# Patient Record
Sex: FEMALE | Race: WHITE | NOT HISPANIC OR LATINO | Employment: FULL TIME | URBAN - NONMETROPOLITAN AREA
[De-identification: names, ages, dates, MRNs, and addresses within clinical notes are randomized per-mention and may not be internally consistent; named-entity substitution may affect disease eponyms.]

---

## 2017-11-25 ENCOUNTER — OFFICE VISIT (OUTPATIENT)
Dept: URGENT CARE | Facility: CLINIC | Age: 37
End: 2017-11-25
Payer: COMMERCIAL

## 2017-11-25 ENCOUNTER — APPOINTMENT (OUTPATIENT)
Dept: LAB | Facility: HOSPITAL | Age: 37
End: 2017-11-25
Payer: COMMERCIAL

## 2017-11-25 DIAGNOSIS — R39.15 URGENCY OF URINATION: ICD-10-CM

## 2017-11-25 PROCEDURE — 87186 SC STD MICRODIL/AGAR DIL: CPT

## 2017-11-25 PROCEDURE — 87077 CULTURE AEROBIC IDENTIFY: CPT

## 2017-11-25 PROCEDURE — 99204 OFFICE O/P NEW MOD 45 MIN: CPT

## 2017-11-25 PROCEDURE — 81002 URINALYSIS NONAUTO W/O SCOPE: CPT

## 2017-11-25 PROCEDURE — S9088 SERVICES PROVIDED IN URGENT: HCPCS

## 2017-11-25 PROCEDURE — 87086 URINE CULTURE/COLONY COUNT: CPT

## 2017-11-27 ENCOUNTER — GENERIC CONVERSION - ENCOUNTER (OUTPATIENT)
Dept: OTHER | Facility: OTHER | Age: 37
End: 2017-11-27

## 2017-11-27 LAB — BACTERIA UR CULT: ABNORMAL

## 2017-12-05 NOTE — PROGRESS NOTES
Assessment    1  Acute urinary tract infection (599 0) (N39 0)   2  Acute upper respiratory infection (465 9) (J06 9)    Plan  Acute upper respiratory infection    · Nasal Decongestant Spray 0 05 % Nasal Solution; Use 2 sprays each nostril twice  daily for maximum of 3-5 days   · Sudafed 30 MG Oral Tablet; Take 2 tablets every 6 hours as needed for congestion  Acute upper respiratory infection, Acute urinary tract infection    · Drink at least 6 glasses of water or juice a day ; Status:Complete;   Done: [id number]  Acute urinary tract infection    · Sulfamethoxazole-Trimethoprim 800-160 MG Oral Tablet (Bactrim DS); TAKE 1  TABLET TWICE DAILY WITH FOOD  Urgency of urination    · (1) URINE CULTURE; Source:Urine, Clean Catch; Status:Active - Retrospective By  Protocol Authorization; Requested for:25Nov2017;    · Urine Dip Non-Automated- POC; Status:Resulted - Requires Verification,Retrospective  By Protocol Authorization;   Done: [id number] 08:43AM    Discussion/Summary  Discussion Summary:   Urine dip done today is positive for blood and leukocytes  Bactrim DS twice daily for 5 days  Urine will be sent for culture  Follow-up with the primary care provider if there's no improvement with respiratory her urine symptoms over the next 2-3 days  Go to the ER if any symptoms increase  Avoid use of nasal decongestant more than 5 days which can cause rebound congestion with overuse  Medication Side Effects Reviewed: Possible side effects of new medications were reviewed with the patient/guardian today  Understands and agrees with treatment plan: The treatment plan was reviewed with the patient/guardian  The patient/guardian understands and agrees with the treatment plan   Counseling Documentation With Imm: The patient was counseled regarding instructions for management, impressions  Chief Complaint    1  Cold Symptoms   2   Urinary Frequency  Chief Complaint Free Text Note Form: C/o urinary urgency and burning for 2 days also for 1 week head congestion and ear pressure  History of Present Illness  Hospital Based Practices Required Assessment:   Pain Assessment   the patient states they do not have pain  (on a scale of 0 to 10, the patient rates the pain at 0 )   Abuse And Domestic Violence Screen    Yes, the patient is safe at home  The patient states no one is hurting them  Depression And Suicide Screen  No, the patient has not had thoughts of hurting themself  No, the patient has not felt depressed in the past 7 days  Prefered Language is  [place]  Primary Language is  English  Urinary Frequency:   Natty Avendano presents with complaints of urinary frequency (Complains of increased urinary frequency and dysuria over the past 2 days  Has only had one urinary tract infection this year with no recent treatment  Denies hematuria, flank pain, fevers, chills)   Cold Symptoms:   Natty Avendano presents with complaints of sudden onset of constant episodes of moderate cold symptoms Episodes started 1 week ago (Denies relief with Bharti-Seward  Minimal cough)   Associated symptoms include nasal congestion, runny nose, post nasal drainage and facial pressure, but no scratchy throat, no sore throat, no ear pain, no fever and no chills  Review of Systems  Focused-Female:   Constitutional: as noted in HPI    ENT: as noted in HPI  Respiratory: as noted in HPI  Gastrointestinal: no abdominal pain, no nausea and no vomiting  Genitourinary: as noted in HPI  Active Problems    1  Acid reflux (530 81) (K21 9)   2  Seasonal allergies (477 9) (J30 2)   3  Sinusitis (473 9) (J32 9)    Past Medical History    1  History of urinary tract infection (V13 02) (Z87 440)    Family History  Family History    1  No pertinent family history    Social History    · Never smoker   · No drug use   · Occasional alcohol use    Surgical History    1  History of Knee Surgery    Current Meds   1   Claritin 10 MG Oral Capsule; Therapy: (Recorded:25Nov2017) to Recorded   2  Xulane 150-35 MCG/24HR Transdermal Patch Weekly; Therapy: (Recorded:25Nov2017) to Recorded    Allergies    1  No Known Drug Allergies    2  Seasonal    Vitals  Signs   Recorded: [id number] 08:30AM   Temperature: 96 F  Heart Rate: 69  Respiration: 16  Systolic: 649  Diastolic: 82  Height: 5 ft 9 in  Weight: 144 lb 8 oz  BMI Calculated: 21 34  BSA Calculated: 1 8  O2 Saturation: 99    Physical Exam    Constitutional   General appearance: No acute distress, well appearing and well nourished  Ears, Nose, Mouth, and Throat   External inspection of ears and nose: Normal     Otoscopic examination: Tympanic membranes translucent with normal light reflex  Canals patent without erythema  Oropharynx: Normal with no erythema, edema, exudate or lesions  Pulmonary   Respiratory effort: No increased work of breathing or signs of respiratory distress  Auscultation of lungs: Clear to auscultation  Cardiovascular   Auscultation of heart: Normal rate and rhythm, normal S1 and S2, without murmurs  Abdomen   Abdomen: Non-tender, no masses  No CVA tenderness        Results/Data  Urine Dip Non-Automated- POC [id number] 08:43AM Dana Ortega     Test Name Result Flag Reference   Color Yellow     Clarity Hazy     Leukocytes MOD     Nitrite NEG     Blood TRACE     Bilirubin NEG     Urobilinogen 0 2     Protein TRACE     Ph 8 0     Specific Gravity 1 010     Ketone NEG     Glucose NEG     Color Yellow     Clarity Hazy     Leukocytes MOD     Nitrite NEG     Blood TRACE     Bilirubin NEG     Urobilinogen 0 2     Protein TRACE     Ph 8 0     Specific Gravity 1 010     Ketone NEG     Glucose NEG               Signatures   Electronically signed by : KIMBERLEE Snow; Nov 25 2017  8:48AM EST                       (Author)

## 2018-01-03 ENCOUNTER — OFFICE VISIT (OUTPATIENT)
Dept: URGENT CARE | Facility: CLINIC | Age: 38
End: 2018-01-03
Payer: COMMERCIAL

## 2018-01-03 PROCEDURE — S9088 SERVICES PROVIDED IN URGENT: HCPCS

## 2018-01-03 PROCEDURE — 99213 OFFICE O/P EST LOW 20 MIN: CPT

## 2018-01-11 NOTE — PROGRESS NOTES
Assessment   1  Ear pain (028 98) (H92 09)    Discussion/Summary   Discussion Summary:    Upon physical exam no notable abnormalities  Consider allergies as a cause  She reports sometimes she has to switch up her allergy medications as they no longer become effective  I did recommend she try Zyrtec  She is going to follow up with her PCP to further discuss  Upon leaving the room patient did report she was having some burning with urination, but took left over antibiotics and was feeling better  I did tell her she should also discuss this with her PCP if the dysuria returns  Patient did verbalize understanding  Understands and agrees with treatment plan: The treatment plan was reviewed with the patient/guardian  The patient/guardian understands and agrees with the treatment plan    Counseling Documentation With Imm: The patient was counseled regarding instructions for management,-- prognosis,-- risks and benefits of treatment options  Follow Up Instructions: Follow Up with your Primary Care Provider in 3-5 days  If your symptoms worsen, go to the nearest [place] [address] Emergency Department  Chief Complaint   1  Ear Pain  Chief Complaint Free Text Note Form: C/o b/l ear pain started 01/01/18 also c/o eyes burning sensitive to light  History of Present Illness   HPI: C/o b/l ear pain started 01/01/18 also c/o eyes burning sensitive to light  Hospital Based Practices Required Assessment:      Pain Assessment      the patient states they do not have pain  (on a scale of 0 to 10, the patient rates the pain at 0 )      Abuse And Domestic Violence Screen       Yes, the patient is safe at home  -- The patient states no one is hurting them  Depression And Suicide Screen  No, the patient has not had thoughts of hurting themself  No, the patient has not felt depressed in the past 7 days  Prefered Language is  [place]  Primary Language is  English      Ear Pain: Elliot Kitchen presents with complaints of ear pain  Review of Systems   Focused-Female:      Constitutional: No fever, no chills, feels well, no tiredness, no recent weight gain or loss  ENT: as noted in HPI  Cardiovascular: no complaints of slow or fast heart rate, no chest pain, no palpitations, no leg claudication or lower extremity edema  Respiratory: no complaints of shortness of breath, no wheezing, no dyspnea on exertion, no orthopnea or PND  Breasts: no complaints of breast pain, breast lump or nipple discharge  Gastrointestinal: no complaints of abdominal pain, no constipation, no nausea or diarrhea, no vomiting, no bloody stools  Genitourinary: no complaints of dysuria, no incontinence, no pelvic pain, no dysmenorrhea, no vaginal discharge or abnormal vaginal bleeding  Musculoskeletal: no complaints of arthralgia, no myalgia, no joint swelling or stiffness, no limb pain or swelling  Integumentary: no complaints of skin rash or lesion, no itching or dry skin, no skin wounds  Neurological: no complaints of headache, no confusion, no numbness or tingling, no dizziness or fainting  ROS Reviewed:    ROS reviewed  Active Problems   1  Acid reflux (530 81) (K21 9)   2  Acute upper respiratory infection (465 9) (J06 9)   3  Acute urinary tract infection (599 0) (N39 0)   4  Seasonal allergies (477 9) (J30 2)   5  Sinusitis (473 9) (J32 9)   6  Urgency of urination (788 63) (R39 15)    Past Medical History   1  History of urinary tract infection (V13 02) (Z87 440)  Active Problems And Past Medical History Reviewed: The active problems and past medical history were reviewed and updated today  Family History   Family History Reviewed: The family history was reviewed and updated today  Social History    · Never smoker   · No drug use   · Occasional alcohol use  Social History Reviewed: The social history was reviewed and updated today  Surgical History   1  History of Knee Surgery  Surgical History Reviewed: The surgical history was reviewed and updated today  Current Meds    1  Claritin 10 MG Oral Capsule; Therapy: (Recorded:25Nov2017) to Recorded   2  Nasal Decongestant Spray 0 05 % Nasal Solution; Use 2 sprays each nostril twice daily     for maximum of 3-5 days; Therapy: [id number] to (Last Rx:25Nov2017) Ordered   3  Xulane 150-35 MCG/24HR Transdermal Patch Weekly; Therapy: (Recorded:25Nov2017) to Recorded  Medication List Reviewed: The medication list was reviewed and updated today  Allergies   1  No Known Drug Allergies  2  Seasonal    Physical Exam        Constitutional      General appearance: No acute distress, well appearing and well nourished  Eyes      Conjunctiva and lids: No swelling, erythema or discharge  Pupils and irises: Equal, round and reactive to light  Ears, Nose, Mouth, and Throat      External inspection of ears and nose: Normal        Otoscopic examination: Tympanic membranes translucent with normal light reflex  Canals patent without erythema  Nasal mucosa, septum, and turbinates: Normal without edema or erythema  Oropharynx: Normal with no erythema, edema, exudate or lesions  Pulmonary      Respiratory effort: No increased work of breathing or signs of respiratory distress  Auscultation of lungs: Clear to auscultation  Cardiovascular      Palpation of heart: Normal PMI, no thrills  Auscultation of heart: Normal rate and rhythm, normal S1 and S2, without murmurs  Lymphatic      Palpation of lymph nodes in neck: No lymphadenopathy  Musculoskeletal      Gait and station: Normal        Digits and nails: Normal without clubbing or cyanosis  Skin      Skin and subcutaneous tissue: Normal without rashes or lesions         Psychiatric      Orientation to person, place, and time: Normal        Mood and affect: Normal        Signatures    Electronically signed by : Roman Eugene, [facility]; Deandre  3 2018  6:50PM EST                       (Author)     Electronically signed by : KRYSTLE Alvarez ; Deandre 10 2018  9:18AM EST                       (Co-author)

## 2018-01-17 NOTE — RESULT NOTES
Verified Results  (1) URINE CULTURE [id number] 07:20PM Wood Blanchard Order Number: [id number]     Test Name Result Flag Reference   CLINICAL REPORT (Report) A    Test:        Urine culture  Specimen Type:   Urine  Specimen Date:   11/25/2017 7:20 PM  Result Date:    11/27/2017 12:02 PM  Result Status:   Final result  Abnormal:      Yes  Resulting Lab:   [facility] [address] [place] [address]            Tel: [phone]      CULTURE                                       ------------------                                   20,000-29,000 cfu/ml Escherichia coli (Abnormal)      SUSCEPTIBILITY                                   ------------------                                                       Escherichia coli  METHOD                 MARIO  -------------------------------------  -------------------------  AMPICILLIN ($$)             >16 00 ug/ml Resistant  AMPICILLIN + SULBACTAM ($)       <=8/4 ug/ml  Susceptible  AZTREONAM ($$$)             <=8 ug/ml   Susceptible  CEFAZOLIN ($)              <=8 00 ug/ml Susceptible  CIPROFLOXACIN ($)            <=1 00 ug/ml Susceptible  GENTAMICIN ($$)             <=4 ug/ml   Susceptible  LEVOFLOXACIN ($)            <=2 00 ug/ml Susceptible  NITROFURANTOIN             <=32 ug/ml  Susceptible  PIPERACILLIN + TAZOBACTAM ($$$)     <=16 ug/ml  Susceptible  TETRACYCLINE              >8 ug/ml   Resistant  TOBRAMYCIN ($)             <=4 ug/ml   Susceptible  TRIMETHOPRIM + SULFAMETHOXAZOLE ($$$)  >2/38 ug/ml  Resistant                               Plan  Acute urinary tract infection    · Sulfamethoxazole-Trimethoprim 800-160 MG Oral Tablet (Bactrim DS)   · Ciprofloxacin HCl - 500 MG Oral Tablet;  Take 1 tablet twice daily

## 2018-02-23 ENCOUNTER — OFFICE VISIT (OUTPATIENT)
Dept: URGENT CARE | Facility: CLINIC | Age: 38
End: 2018-02-23
Payer: COMMERCIAL

## 2018-02-23 VITALS
OXYGEN SATURATION: 98 % | DIASTOLIC BLOOD PRESSURE: 91 MMHG | SYSTOLIC BLOOD PRESSURE: 150 MMHG | TEMPERATURE: 97.5 F | HEART RATE: 114 BPM | BODY MASS INDEX: 20.44 KG/M2 | RESPIRATION RATE: 18 BRPM | HEIGHT: 69 IN | WEIGHT: 138 LBS

## 2018-02-23 DIAGNOSIS — N39.0 URINARY TRACT INFECTION WITHOUT HEMATURIA, SITE UNSPECIFIED: Primary | ICD-10-CM

## 2018-02-23 LAB
SL AMB  POCT GLUCOSE, UA: NEGATIVE
SL AMB LEUKOCYTE ESTERASE,UA: ABNORMAL
SL AMB POCT BILIRUBIN,UA: NEGATIVE
SL AMB POCT BLOOD,UA: ABNORMAL
SL AMB POCT CLARITY,UA: CLEAR
SL AMB POCT COLOR,UA: YELLOW
SL AMB POCT KETONES,UA: ABNORMAL
SL AMB POCT NITRITE,UA: NEGATIVE
SL AMB POCT PH,UA: 5
SL AMB POCT SPECIFIC GRAVITY,UA: 1.01
SL AMB POCT URINE PROTEIN: NEGATIVE
SL AMB POCT UROBILINOGEN: NORMAL

## 2018-02-23 PROCEDURE — S9088 SERVICES PROVIDED IN URGENT: HCPCS | Performed by: PHYSICIAN ASSISTANT

## 2018-02-23 PROCEDURE — 99203 OFFICE O/P NEW LOW 30 MIN: CPT | Performed by: PHYSICIAN ASSISTANT

## 2018-02-23 PROCEDURE — 87086 URINE CULTURE/COLONY COUNT: CPT | Performed by: PHYSICIAN ASSISTANT

## 2018-02-23 RX ORDER — LORATADINE 10 MG/1
10 TABLET ORAL DAILY
COMMUNITY

## 2018-02-23 RX ORDER — VALACYCLOVIR HYDROCHLORIDE 500 MG/1
500 TABLET, FILM COATED ORAL 2 TIMES DAILY
COMMUNITY
End: 2019-01-23

## 2018-02-23 RX ORDER — NITROFURANTOIN MACROCRYSTALS 100 MG/1
100 CAPSULE ORAL 2 TIMES DAILY
Qty: 14 CAPSULE | Refills: 0 | Status: SHIPPED | OUTPATIENT
Start: 2018-02-23 | End: 2018-03-02

## 2018-02-23 NOTE — PROGRESS NOTES
Assessment/Plan:      Diagnoses and all orders for this visit:    Urinary tract infection without hematuria, site unspecified  -     Urine culture  -     nitrofurantoin (MACRODANTIN) 100 mg capsule; Take 1 capsule (100 mg total) by mouth 2 (two) times a day for 7 days    Other orders  -     norelgestromin-ethinyl estradiol (ORTHO EVRA) 150-35 MCG/24HR; Place 1 patch on the skin once a week  -     loratadine (CLARITIN) 10 mg tablet; Take 10 mg by mouth daily  -     valACYclovir (VALTREX) 500 mg tablet; Take 500 mg by mouth 2 (two) times a day        Patient Instructions   Take antibiotic as prescribed  Increase fluid intake      Subjective:    Chief Complaint   Patient presents with    Possible UTI     burning with some incontinence since this morning  was seen for uti in past but did not complete abx as prescribed      Patient ID: Manolo Lee is a 30 y o  female  Urinary Tract Infection    This is a recurrent problem  The current episode started yesterday  The problem occurs every urination  The problem has been unchanged  The pain is moderate  There has been no fever  There is no history of pyelonephritis  Associated symptoms include frequency and urgency  Pertinent negatives include no chills, discharge, flank pain, hematuria, hesitancy, nausea, possible pregnancy, sweats or vomiting  She has tried nothing for the symptoms  There is no history of catheterization, kidney stones, recurrent UTIs, a single kidney, urinary stasis or a urological procedure  Review of Systems   Constitutional: Negative for chills  Respiratory: Negative  Cardiovascular: Negative  Gastrointestinal: Negative for nausea and vomiting  Genitourinary: Positive for dysuria, frequency and urgency  Negative for decreased urine volume, difficulty urinating, dyspareunia, enuresis, flank pain, genital sores, hematuria, hesitancy, menstrual problem, pelvic pain, vaginal bleeding, vaginal discharge and vaginal pain  Objective:    /91 (BP Location: Left arm, Patient Position: Sitting, Cuff Size: Adult)   Pulse (!) 114   Temp 97 5 °F (36 4 °C) (Tympanic)   Resp 18   Ht 5' 9" (1 753 m)   Wt 62 6 kg (138 lb)   SpO2 98%   BMI 20 38 kg/m²      Physical Exam   Constitutional: She appears well-developed and well-nourished  Cardiovascular: Normal rate, regular rhythm, normal heart sounds and intact distal pulses  Exam reveals no gallop and no friction rub  No murmur heard  Pulmonary/Chest: Effort normal and breath sounds normal  No respiratory distress  She has no decreased breath sounds  She has no wheezes  She has no rhonchi  She has no rales  She exhibits no tenderness  Abdominal: Soft  Bowel sounds are normal  She exhibits no distension  There is no hepatosplenomegaly, splenomegaly or hepatomegaly  There is tenderness in the suprapubic area  There is no CVA tenderness  No hernia

## 2018-02-24 LAB — BACTERIA UR CULT: NORMAL

## 2018-02-25 ENCOUNTER — TELEPHONE (OUTPATIENT)
Dept: URGENT CARE | Facility: CLINIC | Age: 38
End: 2018-02-25

## 2018-03-17 ENCOUNTER — OFFICE VISIT (OUTPATIENT)
Dept: URGENT CARE | Facility: CLINIC | Age: 38
End: 2018-03-17
Payer: COMMERCIAL

## 2018-03-17 VITALS
TEMPERATURE: 95.2 F | HEART RATE: 83 BPM | BODY MASS INDEX: 19.99 KG/M2 | SYSTOLIC BLOOD PRESSURE: 122 MMHG | RESPIRATION RATE: 16 BRPM | WEIGHT: 135 LBS | DIASTOLIC BLOOD PRESSURE: 82 MMHG | OXYGEN SATURATION: 95 % | HEIGHT: 69 IN

## 2018-03-17 DIAGNOSIS — R35.0 URINARY FREQUENCY: Primary | ICD-10-CM

## 2018-03-17 LAB
SL AMB  POCT GLUCOSE, UA: ABNORMAL
SL AMB LEUKOCYTE ESTERASE,UA: ABNORMAL
SL AMB POCT BILIRUBIN,UA: ABNORMAL
SL AMB POCT BLOOD,UA: ABNORMAL
SL AMB POCT CLARITY,UA: ABNORMAL
SL AMB POCT COLOR,UA: YELLOW
SL AMB POCT KETONES,UA: ABNORMAL
SL AMB POCT NITRITE,UA: ABNORMAL
SL AMB POCT PH,UA: 7
SL AMB POCT SPECIFIC GRAVITY,UA: 1.01
SL AMB POCT URINE PROTEIN: ABNORMAL
SL AMB POCT UROBILINOGEN: 0.2

## 2018-03-17 PROCEDURE — 99213 OFFICE O/P EST LOW 20 MIN: CPT | Performed by: PHYSICIAN ASSISTANT

## 2018-03-17 PROCEDURE — 87077 CULTURE AEROBIC IDENTIFY: CPT | Performed by: PHYSICIAN ASSISTANT

## 2018-03-17 PROCEDURE — 81002 URINALYSIS NONAUTO W/O SCOPE: CPT | Performed by: PHYSICIAN ASSISTANT

## 2018-03-17 PROCEDURE — 87086 URINE CULTURE/COLONY COUNT: CPT | Performed by: PHYSICIAN ASSISTANT

## 2018-03-17 PROCEDURE — S9088 SERVICES PROVIDED IN URGENT: HCPCS | Performed by: PHYSICIAN ASSISTANT

## 2018-03-17 PROCEDURE — 87186 SC STD MICRODIL/AGAR DIL: CPT | Performed by: PHYSICIAN ASSISTANT

## 2018-03-17 NOTE — PATIENT INSTRUCTIONS
Urine dip reveals in significant findings at this time  Urine will be sent for UA, reflux to culture  Increase clear liquids  Follow up with your primary care provider in 2 days  Get evaluated sooner in the emergency room if any symptoms increase

## 2018-03-17 NOTE — PROGRESS NOTES
[facility]        NAME: Cinthia Anderson is a 30 y o  female  : 1980    MRN: [id number]  DATE: 2018  TIME: 9:09 AM    Assessment and Plan   Urinary frequency [R35 0]  1  Urinary frequency  POCT urine dip    UA w Reflex to Microscopic w Reflex to Culture    CANCELED: Urine culture         Patient Instructions     Patient Instructions   Urine dip reveals in significant findings at this time  Urine will be sent for UA, reflux to culture  Increase clear liquids  Follow up with your primary care provider in 2 days  Get evaluated sooner in the emergency room if any symptoms increase  M*Modal software was used to dictate this note  It may contain errors with dictating incorrect words/spelling  Please contact provider directly for any questions  Follow up with PCP in 3-5 days  Proceed to  ER if symptoms worsen  Chief Complaint     Chief Complaint   Patient presents with    Urinary Frequency     Started 2 days ago with freq  burning darker in color  was seen in Feb  for same symptoms and feels that her symptoms never got better even after being on antibiotics  Aaliyah Venegas LPN          History of Present Illness       Patient presents today for evaluation of increased urinary frequency, urgency over the past 2 days  She denies any nausea, vomiting, fever, chills, flank pain  She states her urine is cloudy  She states these symptoms never actually resolved since she was treated for a urinary tract infection in 2017  She has also seen here in February and placed on 2 different antibiotics with continued persistent symptoms  She did not know that her culture was negative at that time in February  Culture was positive in November  She has not seen her family provider for persistent/recurrent symptoms  Review of Systems   Review of Systems   Constitutional: Negative for chills and fever     Gastrointestinal:        As stated in HPI   Genitourinary:        As stated in HPI         Current Medications       Current Outpatient Prescriptions:     loratadine (CLARITIN) 10 mg tablet, Take 10 mg by mouth daily, Disp: , Rfl:     norelgestromin-ethinyl estradiol (ORTHO EVRA) 150-35 MCG/24HR, Place 1 patch on the skin once a week, Disp: , Rfl:     valACYclovir (VALTREX) 500 mg tablet, Take 500 mg by mouth 2 (two) times a day, Disp: , Rfl:     Current Allergies     Allergies as of 03/17/2018    (No Known Allergies)            The following portions of the patient's history were reviewed and updated as appropriate: allergies, current medications, past family history, past medical history, past social history, past surgical history and problem list      Past Medical History:   Diagnosis Date    GERD (gastroesophageal reflux disease)     Herpes     Psychiatric disorder     depression, anxiety       Past Surgical History:   Procedure Laterality Date    KNEE SURGERY         No family history on file  Medications have been verified  Objective   /82 (BP Location: Right arm, Patient Position: Sitting, Cuff Size: Standard)   Pulse 83   Temp (!) 95 2 °F (35 1 °C) (Tympanic)   Resp 16   Ht 5' 9" (1 753 m)   Wt 61 2 kg (135 lb)   SpO2 95%   BMI 19 94 kg/m²        Physical Exam     Physical Exam   Constitutional: She appears well-developed and well-nourished  Neck: Neck supple  Cardiovascular: Normal rate, regular rhythm and normal heart sounds  Pulmonary/Chest: Effort normal and breath sounds normal  No respiratory distress  She has no wheezes  She has no rales  Abdominal: Soft  Bowel sounds are normal  She exhibits no mass  There is no tenderness

## 2018-03-21 LAB
BACTERIA UR CULT: ABNORMAL
BACTERIA UR CULT: ABNORMAL

## 2018-03-22 DIAGNOSIS — N39.0 RECURRENT URINARY TRACT INFECTION: Primary | ICD-10-CM

## 2018-03-22 RX ORDER — CIPROFLOXACIN 500 MG/1
500 TABLET, FILM COATED ORAL EVERY 12 HOURS SCHEDULED
Qty: 14 TABLET | Refills: 0 | Status: SHIPPED | OUTPATIENT
Start: 2018-03-22 | End: 2018-03-23 | Stop reason: SDUPTHER

## 2018-03-23 DIAGNOSIS — N39.0 RECURRENT URINARY TRACT INFECTION: ICD-10-CM

## 2018-03-23 RX ORDER — CIPROFLOXACIN 500 MG/1
500 TABLET, FILM COATED ORAL EVERY 12 HOURS SCHEDULED
Qty: 14 TABLET | Refills: 0 | Status: SHIPPED | OUTPATIENT
Start: 2018-03-23 | End: 2018-03-30

## 2018-11-15 ENCOUNTER — TRANSCRIBE ORDERS (OUTPATIENT)
Dept: URGENT CARE | Facility: CLINIC | Age: 38
End: 2018-11-15

## 2018-11-15 ENCOUNTER — APPOINTMENT (OUTPATIENT)
Dept: RADIOLOGY | Facility: CLINIC | Age: 38
End: 2018-11-15
Payer: COMMERCIAL

## 2018-11-15 DIAGNOSIS — S62.307A: Primary | ICD-10-CM

## 2018-11-15 PROCEDURE — 73120 X-RAY EXAM OF HAND: CPT

## 2019-01-23 ENCOUNTER — OFFICE VISIT (OUTPATIENT)
Dept: URGENT CARE | Facility: CLINIC | Age: 39
End: 2019-01-23
Payer: COMMERCIAL

## 2019-01-23 VITALS
HEIGHT: 69 IN | SYSTOLIC BLOOD PRESSURE: 119 MMHG | TEMPERATURE: 97.8 F | RESPIRATION RATE: 18 BRPM | DIASTOLIC BLOOD PRESSURE: 58 MMHG | HEART RATE: 97 BPM | WEIGHT: 131 LBS | BODY MASS INDEX: 19.4 KG/M2 | OXYGEN SATURATION: 97 %

## 2019-01-23 DIAGNOSIS — J01.00 ACUTE MAXILLARY SINUSITIS, RECURRENCE NOT SPECIFIED: Primary | ICD-10-CM

## 2019-01-23 PROCEDURE — 99213 OFFICE O/P EST LOW 20 MIN: CPT | Performed by: PHYSICIAN ASSISTANT

## 2019-01-23 PROCEDURE — S9088 SERVICES PROVIDED IN URGENT: HCPCS | Performed by: PHYSICIAN ASSISTANT

## 2019-01-23 RX ORDER — AZITHROMYCIN 250 MG/1
TABLET, FILM COATED ORAL
Qty: 6 TABLET | Refills: 0 | Status: SHIPPED | COMMUNITY
Start: 2019-01-23 | End: 2019-01-23 | Stop reason: SDUPTHER

## 2019-01-23 RX ORDER — AZITHROMYCIN 250 MG/1
TABLET, FILM COATED ORAL
Qty: 6 TABLET | Refills: 0 | Status: SHIPPED | OUTPATIENT
Start: 2019-01-23 | End: 2019-01-27

## 2019-01-23 NOTE — PROGRESS NOTES
[address]  (office) [phone]  (fax) [phone]        NAME: Indy Martinez is a 41 y o  female  : 1980    MRN: [id number]  DATE: 2019  TIME: 8:18 AM    Assessment and Plan   Acute maxillary sinusitis, recurrence not specified [J01 00]  1  Acute maxillary sinusitis, recurrence not specified  azithromycin (ZITHROMAX) 250 mg tablet    DISCONTINUED: azithromycin (ZITHROMAX) 250 mg tablet       Patient Instructions   I have prescribed an antibiotic for the infection  Please take the antibiotic as prescribed and finish the entire prescription  I recommend that the patient takes an over the counter probiotic or eats yogurt with live cultures in it [place]) to keep good bacteria in the gut and help prevent diarrhea  Wash hands frequently to prevent the spread of infection  Can use over the counter cough and cold medications to help with symptoms  Ibuprofen and/or tylenol as needed for pain or fever  If not improving over the next 3-5 days, follow up with PCP  To present to the ER if symptoms worsen  Chief Complaint     Chief Complaint   Patient presents with    Cold Like Symptoms     cough,sinus pressure and nasal congestion  Pressure in b/l ears x 3weeks         History of Present Illness   Andralselina Elizabeth presents to the clinic c/o    Sinusitis   This is a new problem  The current episode started 1 to 4 weeks ago  The problem has been gradually worsening since onset  The maximum temperature recorded prior to her arrival was 100 4 - 100 9 F  Associated symptoms include congestion, coughing and sinus pressure  Pertinent negatives include no chills, diaphoresis, ear pain, headaches, neck pain, shortness of breath, sneezing or sore throat  Past treatments include antibiotics (given amox from PCP 2 weeks ago, was called in, did not see PCP)         Review of Systems   Review of Systems   Constitutional: Negative for activity change, appetite change, chills, diaphoresis, fatigue and fever  HENT: Positive for congestion, postnasal drip, sinus pain and sinus pressure  Negative for ear discharge, ear pain, facial swelling, rhinorrhea, sneezing and sore throat  Eyes: Negative for photophobia, pain, discharge, redness, itching and visual disturbance  Respiratory: Positive for cough  Negative for apnea, chest tightness, shortness of breath and wheezing  Cardiovascular: Negative for chest pain  Gastrointestinal: Negative for abdominal distention, abdominal pain, anal bleeding, blood in stool, constipation, diarrhea, nausea and vomiting  Genitourinary: Negative for dysuria, flank pain, frequency, hematuria and urgency  Musculoskeletal: Negative for arthralgias, back pain, gait problem, joint swelling, myalgias, neck pain and neck stiffness  Skin: Negative for color change, rash and wound  Allergic/Immunologic: Negative for immunocompromised state  Neurological: Negative for dizziness, facial asymmetry and headaches  Hematological: Negative for adenopathy  Psychiatric/Behavioral: Negative for confusion and decreased concentration           Current Medications     Long-Term Prescriptions   Medication Sig Dispense Refill    loratadine (CLARITIN) 10 mg tablet Take 10 mg by mouth daily         Current Allergies     Allergies as of 01/23/2019    (No Known Allergies)            The following portions of the patient's history were reviewed and updated as appropriate: allergies, current medications, past family history, past medical history, past social history, past surgical history and problem list   Past Medical History:   Diagnosis Date    GERD (gastroesophageal reflux disease)     Herpes     Psychiatric disorder     depression, anxiety     Past Surgical History:   Procedure Laterality Date    KNEE SURGERY       Social History     Social History    Marital status: Single     Spouse name: N/A    Number of children: N/A    Years of education: N/A     Occupational History    Not on file  Social History Main Topics    Smoking status: Current Some Day Smoker     Packs/day: 0 50    Smokeless tobacco: Never Used    Alcohol use Yes      Comment: socially    Drug use: No    Sexual activity: Not on file     Other Topics Concern    Not on file     Social History Narrative    No narrative on file       Objective   /58   Pulse 97   Temp 97 8 °F (36 6 °C)   Resp 18   Ht 5' 9" (1 753 m)   Wt 59 4 kg (131 lb)   SpO2 97%   BMI 19 35 kg/m²      Physical Exam     Physical Exam   Constitutional: She is oriented to person, place, and time  She appears well-developed and well-nourished  No distress  HENT:   Head: Normocephalic and atraumatic  Right Ear: Tympanic membrane and external ear normal    Left Ear: Tympanic membrane and external ear normal    Nose: Right sinus exhibits maxillary sinus tenderness  Right sinus exhibits no frontal sinus tenderness  Left sinus exhibits maxillary sinus tenderness  Left sinus exhibits no frontal sinus tenderness  Mouth/Throat: Posterior oropharyngeal erythema present  No oropharyngeal exudate  PND visualized   Eyes: Pupils are equal, round, and reactive to light  Conjunctivae and EOM are normal  Right eye exhibits no discharge  Left eye exhibits no discharge  No scleral icterus  Neck: Normal range of motion  Neck supple  No JVD present  No tracheal deviation present  No thyromegaly present  Cardiovascular: Normal rate, regular rhythm and normal heart sounds  Exam reveals no gallop and no friction rub  No murmur heard  Pulmonary/Chest: Effort normal and breath sounds normal  No stridor  No respiratory distress  She has no decreased breath sounds  She has no wheezes  She has no rhonchi  She has no rales  She exhibits no tenderness  Musculoskeletal: Normal range of motion  She exhibits no tenderness or deformity     Lymphadenopathy:     She has no cervical adenopathy  Neurological: She is alert and oriented to person, place, and time  She has normal reflexes  Coordination normal    Skin: Skin is warm and dry  No rash noted  She is not diaphoretic  No erythema  No pallor  Psychiatric: She has a normal mood and affect  Her behavior is normal  Judgment and thought content normal    Nursing note and vitals reviewed        Murray Abernathy PA-C

## 2019-02-28 ENCOUNTER — OFFICE VISIT (OUTPATIENT)
Dept: URGENT CARE | Facility: CLINIC | Age: 39
End: 2019-02-28
Payer: COMMERCIAL

## 2019-02-28 VITALS
BODY MASS INDEX: 19.26 KG/M2 | TEMPERATURE: 98.4 F | OXYGEN SATURATION: 96 % | HEART RATE: 104 BPM | SYSTOLIC BLOOD PRESSURE: 134 MMHG | WEIGHT: 130 LBS | RESPIRATION RATE: 18 BRPM | HEIGHT: 69 IN | DIASTOLIC BLOOD PRESSURE: 91 MMHG

## 2019-02-28 DIAGNOSIS — K52.9 GASTROENTERITIS: Primary | ICD-10-CM

## 2019-02-28 DIAGNOSIS — R11.0 NAUSEA: ICD-10-CM

## 2019-02-28 PROCEDURE — S9088 SERVICES PROVIDED IN URGENT: HCPCS | Performed by: PHYSICIAN ASSISTANT

## 2019-02-28 PROCEDURE — 99213 OFFICE O/P EST LOW 20 MIN: CPT | Performed by: PHYSICIAN ASSISTANT

## 2019-02-28 RX ORDER — ALBUTEROL SULFATE 90 UG/1
1 AEROSOL, METERED RESPIRATORY (INHALATION) EVERY 6 HOURS PRN
COMMUNITY

## 2019-02-28 RX ORDER — ONDANSETRON 4 MG/1
4 TABLET, FILM COATED ORAL EVERY 8 HOURS PRN
Qty: 20 TABLET | Refills: 0 | Status: SHIPPED | OUTPATIENT
Start: 2019-02-28

## 2019-02-28 RX ORDER — LANOLIN ALCOHOL/MO/W.PET/CERES
3 CREAM (GRAM) TOPICAL
COMMUNITY

## 2019-02-28 NOTE — PROGRESS NOTES
[address] [facility]  (office) [phone]  (fax) [phone]        NAME: Stephon Madrid is a 41 y o  female  : 1980    MRN: [id number]  DATE: 2019  TIME: 10:50 AM    Assessment and Plan   Gastroenteritis [K52 9]  1  Gastroenteritis     2  Nausea  ondansetron (ZOFRAN) 4 mg tablet       Patient Instructions   If the pain begins to localize especially in the RLQ or if the pain worsens, to present to the ER for further evaluation  Stay hydrated by taking small sips of water through out the day  Avoid large amounts of water at one time  Advance diet as tolerated starting with crackers, toast   Can use imodium for diarrhea  If you are unable to hold down fluids and feel dehydrated, go to the emergency room  Can take tylenol or ibuprofen as needed for headache, pain, fever  Probiotics which can be found over the counter in pill form or in yogurt, such as activia, would be beneficial to increase normal gut pancho and help with diarrhea  If symptoms worsen go to the emergency room  To present to the ER if symptoms worsen  Chief Complaint     Chief Complaint   Patient presents with    Vomiting     with chills x 1 day          History of Present Illness   Andraleen Byers Button presents to the clinic c/o  No chance of pregnancy, tubes tied  Vomiting    This is a new problem  The current episode started yesterday  The problem occurs more than 10 times per day  The emesis has an appearance of bile  There has been no fever  Associated symptoms include abdominal pain (diffuse) and chills  Pertinent negatives include no arthralgias, chest pain, coughing, diarrhea, dizziness, fever, headaches, myalgias, sweats, URI or weight loss  She has tried increased fluids for the symptoms  The treatment provided no relief  Review of Systems   Review of Systems   Constitutional: Positive for chills   Negative for activity change, appetite change, diaphoresis, fatigue, fever and weight loss  HENT: Negative for congestion, ear discharge, ear pain, facial swelling, rhinorrhea, sinus pressure, sinus pain, sneezing and sore throat  Eyes: Negative for photophobia, pain, discharge, redness, itching and visual disturbance  Respiratory: Negative for apnea, cough, chest tightness, shortness of breath and wheezing  Cardiovascular: Negative for chest pain  Gastrointestinal: Positive for abdominal pain (diffuse) and vomiting  Negative for abdominal distention, anal bleeding, blood in stool, constipation, diarrhea and nausea  Genitourinary: Negative for dysuria, flank pain, frequency, hematuria and urgency  Musculoskeletal: Negative for arthralgias, back pain, gait problem, joint swelling, myalgias, neck pain and neck stiffness  Skin: Negative for color change, rash and wound  Allergic/Immunologic: Negative for immunocompromised state  Neurological: Negative for dizziness, facial asymmetry and headaches  Hematological: Negative for adenopathy  Psychiatric/Behavioral: Negative for confusion and decreased concentration           Current Medications     Long-Term Medications   Medication Sig Dispense Refill    loratadine (CLARITIN) 10 mg tablet Take 10 mg by mouth daily      ondansetron (ZOFRAN) 4 mg tablet Take 1 tablet (4 mg total) by mouth every 8 (eight) hours as needed for nausea or vomiting 20 tablet 0       Current Allergies     Allergies as of 02/28/2019    (No Known Allergies)            The following portions of the patient's history were reviewed and updated as appropriate: allergies, current medications, past family history, past medical history, past social history, past surgical history and problem list   Past Medical History:   Diagnosis Date    GERD (gastroesophageal reflux disease)     Herpes     Psychiatric disorder     depression, anxiety     Past Surgical History:   Procedure Laterality Date    KNEE SURGERY       Social History     Socioeconomic History    Marital status: Single     Spouse name: Not on file    Number of children: Not on file    Years of education: Not on file    Highest education level: Not on file   Occupational History    Not on file   Social Needs    Financial resource strain: Not on file    Food insecurity:     Worry: Not on file     Inability: Not on file    Transportation needs:     Medical: Not on file     Non-medical: Not on file   Tobacco Use    Smoking status: Current Some Day Smoker     Packs/day: 0 50    Smokeless tobacco: Never Used   Substance and Sexual Activity    Alcohol use: Yes     Comment: socially    Drug use: No    Sexual activity: Not on file   Lifestyle    Physical activity:     Days per week: Not on file     Minutes per session: Not on file    Stress: Not on file   Relationships    Social connections:     Talks on phone: Not on file     Gets together: Not on file     Attends Baptism service: Not on file     Active member of club or organization: Not on file     Attends meetings of clubs or organizations: Not on file     Relationship status: Not on file    Intimate partner violence:     Fear of current or ex partner: Not on file     Emotionally abused: Not on file     Physically abused: Not on file     Forced sexual activity: Not on file   Other Topics Concern    Not on file   Social History Narrative    Not on file       Objective   /91   Pulse 104   Temp 98 4 °F (36 9 °C)   Resp 18   Ht 5' 9" (1 753 m)   Wt 59 kg (130 lb)   SpO2 96%   BMI 19 20 kg/m²      Physical Exam     Physical Exam   Constitutional: She is oriented to person, place, and time  She appears well-developed and well-nourished  No distress  HENT:   Head: Normocephalic and atraumatic  Right Ear: Tympanic membrane and external ear normal    Left Ear: Tympanic membrane and external ear normal    Nose: Nose normal    Mouth/Throat: Oropharynx is clear and moist  No oropharyngeal exudate  Eyes: Pupils are equal, round, and reactive to light  Conjunctivae and EOM are normal  Right eye exhibits no discharge  Left eye exhibits no discharge  No scleral icterus  Neck: Normal range of motion  Neck supple  No JVD present  No tracheal deviation present  No thyromegaly present  Cardiovascular: Normal rate, regular rhythm and normal heart sounds  Exam reveals no gallop and no friction rub  No murmur heard  Pulmonary/Chest: Effort normal and breath sounds normal  No stridor  No respiratory distress  She has no decreased breath sounds  She has no wheezes  She has no rhonchi  She has no rales  She exhibits no tenderness  Abdominal: Soft  Bowel sounds are normal  She exhibits no distension and no mass  There is generalized tenderness (mild)  There is no rigidity, no rebound, no guarding, no CVA tenderness, no tenderness at McBurney's point and negative Kang's sign  No hernia  Musculoskeletal: Normal range of motion  She exhibits no tenderness or deformity  Lymphadenopathy:     She has no cervical adenopathy  Neurological: She is alert and oriented to person, place, and time  She has normal reflexes  Coordination normal    Skin: Skin is warm and dry  No rash noted  She is not diaphoretic  No erythema  No pallor  Psychiatric: She has a normal mood and affect  Her behavior is normal  Judgment and thought content normal    Nursing note and vitals reviewed        Sophia Pearson PA-C

## 2021-12-13 ENCOUNTER — APPOINTMENT (OUTPATIENT)
Dept: LAB | Facility: HOSPITAL | Age: 41
End: 2021-12-13
Payer: COMMERCIAL

## 2022-01-18 ENCOUNTER — OFFICE VISIT (OUTPATIENT)
Dept: OBGYN CLINIC | Facility: CLINIC | Age: 42
End: 2022-01-18
Payer: COMMERCIAL

## 2022-01-18 ENCOUNTER — APPOINTMENT (OUTPATIENT)
Dept: RADIOLOGY | Facility: CLINIC | Age: 42
End: 2022-01-18
Payer: COMMERCIAL

## 2022-01-18 VITALS
SYSTOLIC BLOOD PRESSURE: 146 MMHG | BODY MASS INDEX: 23.7 KG/M2 | WEIGHT: 160 LBS | HEIGHT: 69 IN | HEART RATE: 93 BPM | DIASTOLIC BLOOD PRESSURE: 89 MMHG

## 2022-01-18 DIAGNOSIS — M17.11 PRIMARY OSTEOARTHRITIS OF RIGHT KNEE: ICD-10-CM

## 2022-01-18 DIAGNOSIS — M25.561 ACUTE PAIN OF RIGHT KNEE: ICD-10-CM

## 2022-01-18 DIAGNOSIS — M25.561 ACUTE PAIN OF RIGHT KNEE: Primary | ICD-10-CM

## 2022-01-18 PROCEDURE — 20610 DRAIN/INJ JOINT/BURSA W/O US: CPT | Performed by: ORTHOPAEDIC SURGERY

## 2022-01-18 PROCEDURE — 73562 X-RAY EXAM OF KNEE 3: CPT

## 2022-01-18 PROCEDURE — 99243 OFF/OP CNSLTJ NEW/EST LOW 30: CPT | Performed by: ORTHOPAEDIC SURGERY

## 2022-01-18 RX ORDER — TRIAMCINOLONE ACETONIDE 40 MG/ML
80 INJECTION, SUSPENSION INTRA-ARTICULAR; INTRAMUSCULAR
Status: COMPLETED | OUTPATIENT
Start: 2022-01-18 | End: 2022-01-18

## 2022-01-18 RX ORDER — MELOXICAM 15 MG/1
15 TABLET ORAL DAILY
Qty: 30 TABLET | Refills: 2 | Status: SHIPPED | OUTPATIENT
Start: 2022-01-18

## 2022-01-18 RX ORDER — BUPIVACAINE HYDROCHLORIDE 2.5 MG/ML
4 INJECTION, SOLUTION INFILTRATION; PERINEURAL
Status: COMPLETED | OUTPATIENT
Start: 2022-01-18 | End: 2022-01-18

## 2022-01-18 RX ADMIN — BUPIVACAINE HYDROCHLORIDE 4 ML: 2.5 INJECTION, SOLUTION INFILTRATION; PERINEURAL at 08:49

## 2022-01-18 RX ADMIN — TRIAMCINOLONE ACETONIDE 80 MG: 40 INJECTION, SUSPENSION INTRA-ARTICULAR; INTRAMUSCULAR at 08:49

## 2022-01-18 NOTE — PROGRESS NOTES
Assessment:  1  Acute pain of right knee  XR knee 3 vw right non injury    Injection procedure prior authorization   2  Primary osteoarthritis of right knee  Injection procedure prior authorization    meloxicam (Mobic) 15 mg tablet       Plan:  Right knee osteoarthritis  The patient has on-going right knee pain, history of 2x arthroscopy and has recently tried steroid injection and therefore visco-supplement was ordered  The patient was provided with right knee steroid injection  The patient tolerated the procedure well  The patient should follow up in 3 months for visco-supplement injections  To do next visit:  Return in about 6 weeks (around 3/1/2022) for visco-supplement injections   The above stated was discussed in layman's terms and the patient expressed understanding  All questions were answered to the patient's satisfaction  The patient has advanced arthritic changes of her right knee  There is no medial joint space remaining  Physical examination shows point tenderness along this region  No major patellofemoral or lateral joint pain  Under aseptic technique, the knee was aspirated of 30 cc of fluid and injected with corticosteroid  She tolerated procedure quite well  We will get her approved for viscosupplementation  Follow-up in 6 weeks for re-evaluation  She was also prescribed meloxicam   Return back if there is any further problems    Scribe Attestation    I,:  Portia Shook am acting as a scribe while in the presence of the attending physician :       I,:  Eddy Stephenson DO personally performed the services described in this documentation    as scribed in my presence :             Subjective:   Tammi Gonzalez is a 46 y o  female who presents for initial evaluation of right knee  She has longstanding history of knee symptoms and worse over the past 6 months with no injury  Today she complains of right anteromedial and posterior right knee pain    She rates her pain at 3/10 and 7/10 at its worse  Prolonged sitting aggravates while change of position and stretching alleviates  She has used otc medications with limited benefit  She currently uses IBU 800mg with limited benefit  She does use methocarbamol with some benefit  She had 1x steroid one year ago with benefit at [facility]  History of 2x arthroscopy for meniscus tear by Dr Medina  Review of systems negative unless otherwise specified in HPI    Past Medical History:   Diagnosis Date    GERD (gastroesophageal reflux disease)     Herpes     Psychiatric disorder     depression, anxiety       Past Surgical History:   Procedure Laterality Date    KNEE SURGERY         History reviewed  No pertinent family history      Social History     Occupational History    Not on file   Tobacco Use    Smoking status: Current Some Day Smoker     Packs/day: 0 50    Smokeless tobacco: Never Used   Vaping Use    Vaping Use: Never used   Substance and Sexual Activity    Alcohol use: Yes     Comment: socially    Drug use: No    Sexual activity: Not on file         Current Outpatient Medications:     loratadine (CLARITIN) 10 mg tablet, Take 10 mg by mouth daily, Disp: , Rfl:     albuterol (PROVENTIL HFA,VENTOLIN HFA) 90 mcg/act inhaler, Inhale 1 puff every 6 (six) hours as needed (Patient not taking: Reported on 1/18/2022 ), Disp: , Rfl:     melatonin 3 mg, Take 3 mg by mouth (Patient not taking: Reported on 1/18/2022 ), Disp: , Rfl:     meloxicam (Mobic) 15 mg tablet, Take 1 tablet (15 mg total) by mouth daily, Disp: 30 tablet, Rfl: 2    ondansetron (ZOFRAN) 4 mg tablet, Take 1 tablet (4 mg total) by mouth every 8 (eight) hours as needed for nausea or vomiting (Patient not taking: Reported on 1/18/2022 ), Disp: 20 tablet, Rfl: 0    No Known Allergies         Vitals:    01/18/22 0815   BP: 146/89   Pulse: 93       Objective:  Physical exam  · General: Awake, Alert, Oriented  · Eyes: Pupils equal, round and reactive to light  · Heart: regular rate and rhythm  · Lungs: No audible wheezing  · Abdomen: soft                    Ortho Exam  Right knee:  Well healed arthroscopic portal scars  No erythema or ecchymosis  Modest effusion and swelling  Normal strength  Good ROM   Calf compartments soft and supple  Sensation intact  Toes are warm sensate and mobile        Diagnostics, reviewed and taken today if performed as documented: The attending physician has personally reviewed the pertinent films in PACS and interpretation is as follows:  Right knee x-ray:  Medial bone on bone apposition  Procedures, if performed today:    Large joint arthrocentesis: R knee  Universal Protocol:  Consent: Verbal consent obtained  Risks and benefits: risks, benefits and alternatives were discussed  Consent given by: patient  Time out: Immediately prior to procedure a "time out" was called to verify the correct patient, procedure, equipment, support staff and site/side marked as required  Timeout called at: 1/18/2022 8:39 AM   Patient understanding: patient states understanding of the procedure being performed  Site marked: the operative site was marked  Patient identity confirmed: verbally with patient    Supporting Documentation  Indications: pain   Procedure Details  Location: knee - R knee  Preparation: Patient was prepped and draped in the usual sterile fashion  Needle size: 22 G  Ultrasound guidance: no  Approach: anterolateral  Medications administered: 4 mL bupivacaine 0 25 %; 80 mg triamcinolone acetonide 40 mg/mL    Aspirate amount: 30 mL  Aspirate: blood-tinged    Patient tolerance: patient tolerated the procedure well with no immediate complications  Dressing:  Sterile dressing applied            Portions of the record may have been created with voice recognition software  Occasional wrong word or "sound a like" substitutions may have occurred due to the inherent limitations of voice recognition software    Read the chart carefully and recognize, using context, where substitutions have occurred

## 2022-01-18 NOTE — LETTER
January 18, 2022     [address] [facility]    Patient: Cinthia Anderson   YOB: 1980   Date of Visit: 1/18/2022       Dear Dr Kasi Feldman:    Thank you for referring Rosecarissa Blackwell to me for evaluation  Below are my notes for this consultation  If you have questions, please do not hesitate to call me  I look forward to following your patient along with you  Sincerely,        Elisabeth Montes, DO        CC: No Recipients  Elisabeth Montes,   1/18/2022  8:54 AM  Signed  Assessment:  1  Acute pain of right knee  XR knee 3 vw right non injury    Injection procedure prior authorization   2  Primary osteoarthritis of right knee  Injection procedure prior authorization    meloxicam (Mobic) 15 mg tablet       Plan:  Right knee osteoarthritis  The patient has on-going right knee pain, history of 2x arthroscopy and has recently tried steroid injection and therefore visco-supplement was ordered  The patient was provided with right knee steroid injection  The patient tolerated the procedure well  The patient should follow up in 3 months for visco-supplement injections  To do next visit:  Return in about 6 weeks (around 3/1/2022) for visco-supplement injections   The above stated was discussed in layman's terms and the patient expressed understanding  All questions were answered to the patient's satisfaction  The patient has advanced arthritic changes of her right knee  There is no medial joint space remaining  Physical examination shows point tenderness along this region  No major patellofemoral or lateral joint pain  Under aseptic technique, the knee was aspirated of 30 cc of fluid and injected with corticosteroid  She tolerated procedure quite well  We will get her approved for viscosupplementation  Follow-up in 6 weeks for re-evaluation    She was also prescribed meloxicam   Return back if there is any further problems    Scribe Attestation    I,:  Suma Sams am acting as a scribe while in the presence of the attending physician :       I,:  Tiff Stanford, DO personally performed the services described in this documentation    as scribed in my presence :             Subjective:   Osmel Perdue is a 46 y o  female who presents for initial evaluation of right knee  She has longstanding history of knee symptoms and worse over the past 6 months with no injury  Today she complains of right anteromedial and posterior right knee pain  She rates her pain at 3/10 and 7/10 at its worse  Prolonged sitting aggravates while change of position and stretching alleviates  She has used otc medications with limited benefit  She currently uses IBU 800mg with limited benefit  She does use methocarbamol with some benefit  She had 1x steroid one year ago with benefit at [facility]  History of 2x arthroscopy for meniscus tear by Dr Medina  Review of systems negative unless otherwise specified in HPI    Past Medical History:   Diagnosis Date    GERD (gastroesophageal reflux disease)     Herpes     Psychiatric disorder     depression, anxiety       Past Surgical History:   Procedure Laterality Date    KNEE SURGERY         History reviewed  No pertinent family history      Social History     Occupational History    Not on file   Tobacco Use    Smoking status: Current Some Day Smoker     Packs/day: 0 50    Smokeless tobacco: Never Used   Vaping Use    Vaping Use: Never used   Substance and Sexual Activity    Alcohol use: Yes     Comment: socially    Drug use: No    Sexual activity: Not on file         Current Outpatient Medications:     loratadine (CLARITIN) 10 mg tablet, Take 10 mg by mouth daily, Disp: , Rfl:     albuterol (PROVENTIL HFA,VENTOLIN HFA) 90 mcg/act inhaler, Inhale 1 puff every 6 (six) hours as needed (Patient not taking: Reported on 1/18/2022 ), Disp: , Rfl:     melatonin 3 mg, Take 3 mg by mouth (Patient not taking: Reported on 1/18/2022 ), Disp: , Rfl:     meloxicam (Mobic) 15 mg tablet, Take 1 tablet (15 mg total) by mouth daily, Disp: 30 tablet, Rfl: 2    ondansetron (ZOFRAN) 4 mg tablet, Take 1 tablet (4 mg total) by mouth every 8 (eight) hours as needed for nausea or vomiting (Patient not taking: Reported on 1/18/2022 ), Disp: 20 tablet, Rfl: 0    No Known Allergies         Vitals:    01/18/22 0815   BP: 146/89   Pulse: 93       Objective:  Physical exam  · General: Awake, Alert, Oriented  · Eyes: Pupils equal, round and reactive to light  · Heart: regular rate and rhythm  · Lungs: No audible wheezing  · Abdomen: soft                    Ortho Exam  Right knee:  Well healed arthroscopic portal scars  No erythema or ecchymosis  Modest effusion and swelling  Normal strength  Good ROM   Calf compartments soft and supple  Sensation intact  Toes are warm sensate and mobile        Diagnostics, reviewed and taken today if performed as documented: The attending physician has personally reviewed the pertinent films in PACS and interpretation is as follows:  Right knee x-ray:  Medial bone on bone apposition  Procedures, if performed today:    Large joint arthrocentesis: R knee  Universal Protocol:  Consent: Verbal consent obtained  Risks and benefits: risks, benefits and alternatives were discussed  Consent given by: patient  Time out: Immediately prior to procedure a "time out" was called to verify the correct patient, procedure, equipment, support staff and site/side marked as required    Timeout called at: 1/18/2022 8:39 AM   Patient understanding: patient states understanding of the procedure being performed  Site marked: the operative site was marked  Patient identity confirmed: verbally with patient    Supporting Documentation  Indications: pain   Procedure Details  Location: knee - R knee  Preparation: Patient was prepped and draped in the usual sterile fashion  Needle size: 22 G  Ultrasound guidance: no  Approach: anterolateral  Medications administered: 4 mL bupivacaine 0 25 %; 80 mg triamcinolone acetonide 40 mg/mL    Aspirate amount: 30 mL  Aspirate: blood-tinged    Patient tolerance: patient tolerated the procedure well with no immediate complications  Dressing:  Sterile dressing applied            Portions of the record may have been created with voice recognition software  Occasional wrong word or "sound a like" substitutions may have occurred due to the inherent limitations of voice recognition software  Read the chart carefully and recognize, using context, where substitutions have occurred

## 2022-03-11 ENCOUNTER — TELEPHONE (OUTPATIENT)
Dept: OBGYN CLINIC | Facility: CLINIC | Age: 42
End: 2022-03-11

## 2022-03-11 NOTE — TELEPHONE ENCOUNTER
Dr Gibson  RE: Delivery of Euflexxa   CB#: [phone] from [address] [place] [address] called to set up delivery of patients euflexxa injections         Transferred to [address] [place] [address]

## 2022-03-12 ENCOUNTER — APPOINTMENT (OUTPATIENT)
Dept: RADIOLOGY | Facility: MEDICAL CENTER | Age: 42
End: 2022-03-12
Payer: COMMERCIAL

## 2022-03-12 ENCOUNTER — OFFICE VISIT (OUTPATIENT)
Dept: URGENT CARE | Facility: MEDICAL CENTER | Age: 42
End: 2022-03-12
Payer: COMMERCIAL

## 2022-03-12 VITALS
HEIGHT: 67 IN | BODY MASS INDEX: 25.11 KG/M2 | DIASTOLIC BLOOD PRESSURE: 76 MMHG | RESPIRATION RATE: 16 BRPM | WEIGHT: 160 LBS | HEART RATE: 108 BPM | SYSTOLIC BLOOD PRESSURE: 110 MMHG | OXYGEN SATURATION: 97 % | TEMPERATURE: 97.8 F

## 2022-03-12 DIAGNOSIS — S69.92XA HAND INJURY, LEFT, INITIAL ENCOUNTER: ICD-10-CM

## 2022-03-12 DIAGNOSIS — R07.89 CHEST WALL PAIN: ICD-10-CM

## 2022-03-12 DIAGNOSIS — S29.9XXA INJURY OF CHEST WALL, INITIAL ENCOUNTER: ICD-10-CM

## 2022-03-12 DIAGNOSIS — S63.635A SPRAIN OF INTERPHALANGEAL JOINT OF LEFT RING FINGER, INITIAL ENCOUNTER: Primary | ICD-10-CM

## 2022-03-12 PROCEDURE — G0381 LEV 2 HOSP TYPE B ED VISIT: HCPCS | Performed by: PHYSICIAN ASSISTANT

## 2022-03-12 PROCEDURE — 73130 X-RAY EXAM OF HAND: CPT

## 2022-03-12 PROCEDURE — 71111 X-RAY EXAM RIBS/CHEST4/> VWS: CPT
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
## 2022-03-31 ENCOUNTER — PROCEDURE VISIT (OUTPATIENT)
Dept: OBGYN CLINIC | Facility: CLINIC | Age: 42
End: 2022-03-31
Payer: COMMERCIAL

## 2022-03-31 VITALS — WEIGHT: 160 LBS | BODY MASS INDEX: 25.11 KG/M2 | HEIGHT: 67 IN

## 2022-03-31 DIAGNOSIS — M17.11 PRIMARY OSTEOARTHRITIS OF RIGHT KNEE: Primary | ICD-10-CM

## 2022-03-31 PROCEDURE — 99213 OFFICE O/P EST LOW 20 MIN: CPT | Performed by: ORTHOPAEDIC SURGERY

## 2022-03-31 PROCEDURE — 20610 DRAIN/INJ JOINT/BURSA W/O US: CPT | Performed by: ORTHOPAEDIC SURGERY

## 2022-03-31 RX ORDER — HYALURONATE SODIUM 10 MG/ML
20 SYRINGE (ML) INTRAARTICULAR
Status: COMPLETED | OUTPATIENT
Start: 2022-03-31 | End: 2022-03-31

## 2022-03-31 RX ADMIN — Medication 20 MG: at 09:06
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
## 2022-04-07 ENCOUNTER — PROCEDURE VISIT (OUTPATIENT)
Dept: OBGYN CLINIC | Facility: CLINIC | Age: 42
End: 2022-04-07
Payer: COMMERCIAL

## 2022-04-07 VITALS — WEIGHT: 160 LBS | HEIGHT: 67 IN | BODY MASS INDEX: 25.11 KG/M2

## 2022-04-07 DIAGNOSIS — M17.11 PRIMARY OSTEOARTHRITIS OF RIGHT KNEE: Primary | ICD-10-CM

## 2022-04-07 PROCEDURE — 20610 DRAIN/INJ JOINT/BURSA W/O US: CPT | Performed by: ORTHOPAEDIC SURGERY

## 2022-04-07 RX ORDER — HYALURONATE SODIUM 10 MG/ML
20 SYRINGE (ML) INTRAARTICULAR
Status: COMPLETED | OUTPATIENT
Start: 2022-04-07 | End: 2022-04-07

## 2022-04-07 RX ADMIN — Medication 20 MG: at 09:28

## 2022-04-07 NOTE — PROGRESS NOTES
Assessment/Plan:  Diagnoses and all orders for this visit:    Primary osteoarthritis of right knee  -     Large joint arthrocentesis    Patient was provided with 2nd of 3 shot Euflexxa viscosupplementation injection series for treatment of primary osteoarthritis of the right knee  Patient tolerated treatment(s) well  She will be seen for follow-up in 1 week for completion of Euflexxa injection series  Patient expresses understanding and is in agreement with this treatment plan  Under aseptic technique, the right knee was injected with her 2nd set of Euflexxa  She tolerated procedure quite well  Return back next week for final set of injections  If her condition changes, she will not hesitate to let us know  Physical examination shows diffuse medial lateral joint tenderness with patellofemoral crepitation  Negative Homans  Neurologically intact distally    Subjective:    Patient info: Filiberto Lugo 38 y o  female    HPI    Patient presents today for re-evaluation and continuation of Euflexxa viscosupplementation injection series for treatment of primary osteoarthritis of the right knee  Patient was last seen in regards to this issue on 3/31/2022, at which time she received her initial Euflexxa injection  Patient denies any adverse reaction to previous injections including fever, chills, headache, nausea, dizziness, or malaise  On today's presentation she reports continued medial knee pain with weight-bearing  She denies any recent onset bruising, swelling, numbness, tingling, feelings of instability  Patient's medical history has been reviewed in detail and updated the computerized patient record  Past Medical History:   Diagnosis Date    GERD (gastroesophageal reflux disease)     Herpes     Psychiatric disorder     depression, anxiety       Past Surgical History:   Procedure Laterality Date    KNEE SURGERY         History reviewed  No pertinent family history      Social History Socioeconomic History    Marital status: Single     Spouse name: None    Number of children: None    Years of education: None    Highest education level: None   Occupational History    None   Tobacco Use    Smoking status: Current Some Day Smoker     Packs/day: 0 50    Smokeless tobacco: Never Used   Vaping Use    Vaping Use: Never used   Substance and Sexual Activity    Alcohol use: Yes     Comment: socially    Drug use: No    Sexual activity: None   Other Topics Concern    None   Social History Narrative    None     Social Determinants of Health     Financial Resource Strain: Not on file   Food Insecurity: Not on file   Transportation Needs: Not on file   Physical Activity: Not on file   Stress: Not on file   Social Connections: Not on file   Intimate Partner Violence: Not on file   Housing Stability: Not on file         Current Outpatient Medications:     loratadine (CLARITIN) 10 mg tablet, Take 10 mg by mouth daily, Disp: , Rfl:     meloxicam (Mobic) 15 mg tablet, Take 1 tablet (15 mg total) by mouth daily, Disp: 30 tablet, Rfl: 2    albuterol (PROVENTIL HFA,VENTOLIN HFA) 90 mcg/act inhaler, Inhale 1 puff every 6 (six) hours as needed (Patient not taking: Reported on 1/18/2022 ), Disp: , Rfl:     melatonin 3 mg, Take 3 mg by mouth (Patient not taking: Reported on 1/18/2022 ), Disp: , Rfl:     ondansetron (ZOFRAN) 4 mg tablet, Take 1 tablet (4 mg total) by mouth every 8 (eight) hours as needed for nausea or vomiting (Patient not taking: Reported on 1/18/2022 ), Disp: 20 tablet, Rfl: 0    No Known Allergies    Review of Systems   Constitutional: Negative for fatigue  Gastrointestinal: Negative for nausea  Musculoskeletal:        As noted in HPI   Neurological: Negative for dizziness, weakness, numbness and headaches  All other systems reviewed and are negative         Objective :  Ht 5' 7" (1 702 m)   Wt 72 6 kg (160 lb)   LMP 02/14/2022 (LMP Unknown)   BMI 25 06 kg/m²     Ortho Exam  Right knee(s) -   Patient ambulates with normal gait pattern  Uses no assistive device  No anatomical deformity  Skin is warm and dry to touch with no signs of erythema, ecchymosis, infection  No soft tissue swelling, no effusion noted  ROM 0°-115°  TTP over medial joint line, mildly TTP over lateral joint line  Flexor and extensor mechanisms intact  Knee is stable to varus and valgus stress  - Lachman's  - Anterior Drawer, - Posterior Drawer  - medial Gabino's, - lateral Gabino's  - Pivot Shift  Patella tracks centrally with palpable crepitus  Calf compartments are soft and supple  2+ TP and DP pulses with brisk capillary refill to the toes  Sural, saphenous, tibial, superficial and deep peroneal motor and sensory distributions intact  Sensation light touch intact distally    Physical Exam  Vitals and nursing note reviewed  Constitutional:       General: She is not in acute distress  Appearance: She is well-developed  HENT:      Head: Normocephalic and atraumatic  Eyes:      Conjunctiva/sclera: Conjunctivae normal    Cardiovascular:      Rate and Rhythm: Normal rate  Pulmonary:      Effort: Pulmonary effort is normal    Musculoskeletal:      Cervical back: Neck supple  Skin:     General: Skin is warm and dry  Capillary Refill: Capillary refill takes less than 2 seconds  Neurological:      Mental Status: She is alert and oriented to person, place, and time  Psychiatric:         Mood and Affect: Mood normal          Behavior: Behavior normal           Diagnostic Test Review:  No new imaging reviewed this visit    Large joint arthrocentesis: R knee  Universal Protocol:  Consent: Verbal consent obtained  Risks and benefits: risks, benefits and alternatives were discussed  Consent given by: patient  Time out: Immediately prior to procedure a "time out" was called to verify the correct patient, procedure, equipment, support staff and site/side marked as required    Timeout called at: 4/7/2022 9:23 AM   Patient understanding: patient states understanding of the procedure being performed  Site marked: the operative site was marked  Radiology Images displayed and confirmed   If images not available, report reviewed: imaging studies available  Patient identity confirmed: verbally with patient    Supporting Documentation  Indications: pain and joint swelling   Procedure Details  Location: knee - R knee  Preparation: Patient was prepped and draped in the usual sterile fashion  Needle size: 22 G  Ultrasound guidance: no  Approach: anterolateral  Medications administered: 20 mg Sodium Hyaluronate 20 MG/2ML  Specialty Pharmacy Supplied: received medications from pharmacy  Patient tolerance: patient tolerated the procedure well with no immediate complications  Dressing:  Sterile dressing applied           Scribe Attestation    I,:  Farnaz Ludwig am acting as a scribe while in the presence of the attending physician :       I,:  Araceli Payton DO personally performed the services described in this documentation    as scribed in my presence :

## 2022-04-14 ENCOUNTER — PROCEDURE VISIT (OUTPATIENT)
Dept: OBGYN CLINIC | Facility: CLINIC | Age: 42
End: 2022-04-14
Payer: COMMERCIAL

## 2022-04-14 VITALS — HEIGHT: 67 IN | BODY MASS INDEX: 25.11 KG/M2 | WEIGHT: 160 LBS

## 2022-04-14 DIAGNOSIS — M17.11 PRIMARY OSTEOARTHRITIS OF RIGHT KNEE: Primary | ICD-10-CM

## 2022-04-14 PROCEDURE — 20610 DRAIN/INJ JOINT/BURSA W/O US: CPT | Performed by: PHYSICIAN ASSISTANT

## 2022-04-14 RX ORDER — HYALURONATE SODIUM 10 MG/ML
20 SYRINGE (ML) INTRAARTICULAR
Status: COMPLETED | OUTPATIENT
Start: 2022-04-14 | End: 2022-04-14

## 2022-04-14 RX ADMIN — Medication 20 MG: at 13:16

## 2022-04-14 NOTE — PROGRESS NOTES
ASSESSMENT/PLAN:    Diagnoses and all orders for this visit:    Primary osteoarthritis of right knee    Other orders  -     Large joint arthrocentesis        The patient's right knee was injected with a 3rd and final set of Euflexxa  She tolerated the injection quite well  She will follow up with our office in 6 weeks  The patient is acceptable to this plan  Return in about 6 weeks (around 5/26/2022)  _____________________________________________________  CHIEF COMPLAINT:  Chief Complaint   Patient presents with    Right Knee - Follow-up         SUBJECTIVE:  Melania Nair is a 38 y o  female who presents to our office for viscosupplementation of her right knee  She is here today for her 3rd and final set of Euflexxa  She tolerated last week's injection quite well  She denies any numbness or tingling  She denies any fever or chills  The following portions of the patient's history were reviewed and updated as appropriate: allergies, current medications, past family history, past medical history, past social history, past surgical history and problem list     PAST MEDICAL HISTORY:  Past Medical History:   Diagnosis Date    GERD (gastroesophageal reflux disease)     Herpes     Psychiatric disorder     depression, anxiety       PAST SURGICAL HISTORY:  Past Surgical History:   Procedure Laterality Date    KNEE SURGERY         FAMILY HISTORY:  History reviewed  No pertinent family history      SOCIAL HISTORY:  Social History     Tobacco Use    Smoking status: Current Some Day Smoker     Packs/day: 0 50    Smokeless tobacco: Never Used   Vaping Use    Vaping Use: Never used   Substance Use Topics    Alcohol use: Yes     Comment: socially    Drug use: No       MEDICATIONS:    Current Outpatient Medications:     albuterol (PROVENTIL HFA,VENTOLIN HFA) 90 mcg/act inhaler, Inhale 1 puff every 6 (six) hours as needed (Patient not taking: Reported on 1/18/2022 ), Disp: , Rfl:     loratadine (CLARITIN) 10 mg tablet, Take 10 mg by mouth daily, Disp: , Rfl:     melatonin 3 mg, Take 3 mg by mouth (Patient not taking: Reported on 1/18/2022 ), Disp: , Rfl:     meloxicam (Mobic) 15 mg tablet, Take 1 tablet (15 mg total) by mouth daily, Disp: 30 tablet, Rfl: 2    ondansetron (ZOFRAN) 4 mg tablet, Take 1 tablet (4 mg total) by mouth every 8 (eight) hours as needed for nausea or vomiting (Patient not taking: Reported on 1/18/2022 ), Disp: 20 tablet, Rfl: 0    ALLERGIES:  No Known Allergies    ROS:  Review of Systems     Constitutional: Negative for fatigue, fever or loss of appetite  HENT: Negative  Respiratory: Negative for shortness of breath, dyspnea  Cardiovascular: Negative for chest pain/tightness  Gastrointestinal: Negative for abdominal pain, N/V  Endocrine: Negative for cold/heat intolerance, unexplained weight loss/gain  Genitourinary: Negative for flank pain, dysuria, hematuria  Musculoskeletal: Positive for arthralgia   Skin: Negative for rash  Neurological: Negative for numbness or tingling  Psychiatric/Behavioral: Negative for agitation  _____________________________________________________  PHYSICAL EXAMINATION:    Height 5' 7" (1 702 m), weight 72 6 kg (160 lb)  Constitutional: Oriented to person, place, and time  Appears well-developed and well-nourished  No distress  HENT:   Head: Normocephalic  Eyes: Conjunctivae are normal  Right eye exhibits no discharge  Left eye exhibits no discharge  No scleral icterus  Cardiovascular: Normal rate  Pulmonary/Chest: Effort normal    Neurological: Alert and oriented to person, place, and time  Skin: Skin is warm and dry  No rash noted  Not diaphoretic  No erythema  No pallor  Psychiatric: Normal mood and affect   Behavior is normal  Judgment and thought content normal       MUSCULOSKELETAL EXAMINATION:   Physical Exam  Ortho Exam    Right lower extremity is neurovascularly intact  Toes are pink and mobile   Compartments are soft  No warmth, erythema or ecchymosis  ROM of knee is from 5-115 degrees  Negative Lachman, drawer or pivot shift  No medial instability  Medial joint line tenderness, slight lateral joint line tenderness  Patellofemoral crepitation  Objective:  BP Readings from Last 1 Encounters:   03/12/22 110/76      Wt Readings from Last 1 Encounters:   04/14/22 72 6 kg (160 lb)        BMI:   Estimated body mass index is 25 06 kg/m² as calculated from the following:    Height as of this encounter: 5' 7" (1 702 m)  Weight as of this encounter: 72 6 kg (160 lb)        PROCEDURES PERFORMED:  Large joint arthrocentesis: R knee  Universal Protocol:  Risks and benefits: risks, benefits and alternatives were discussed  Consent given by: patient  Patient understanding: patient states understanding of the procedure being performed  Site marked: the operative site was marked  Supporting Documentation  Indications: pain   Procedure Details  Location: knee - R knee  Preparation: Patient was prepped and draped in the usual sterile fashion  Needle size: 22 G  Ultrasound guidance: no  Approach: lateral  Medications administered: 20 mg Sodium Hyaluronate 20 MG/2ML    Patient tolerance: patient tolerated the procedure well with no immediate complications  Dressing:  Sterile dressing applied

## 2022-04-21 ENCOUNTER — OFFICE VISIT (OUTPATIENT)
Dept: URGENT CARE | Facility: MEDICAL CENTER | Age: 42
End: 2022-04-21
Payer: COMMERCIAL

## 2022-04-21 VITALS
DIASTOLIC BLOOD PRESSURE: 90 MMHG | SYSTOLIC BLOOD PRESSURE: 136 MMHG | RESPIRATION RATE: 18 BRPM | HEART RATE: 82 BPM | TEMPERATURE: 98.6 F | WEIGHT: 150 LBS | OXYGEN SATURATION: 98 % | BODY MASS INDEX: 23.49 KG/M2

## 2022-04-21 DIAGNOSIS — J06.9 ACUTE RESPIRATORY DISEASE: Primary | ICD-10-CM

## 2022-04-21 PROCEDURE — 99213 OFFICE O/P EST LOW 20 MIN: CPT | Performed by: PHYSICIAN ASSISTANT

## 2022-04-21 RX ORDER — FLUTICASONE PROPIONATE 50 MCG
1 SPRAY, SUSPENSION (ML) NASAL DAILY
Qty: 16 G | Refills: 0 | Status: SHIPPED | OUTPATIENT
Start: 2022-04-21

## 2022-04-21 NOTE — LETTER
April 21, 2022     Patient: Tiffany Pereira   YOB: 1980   Date of Visit: 4/21/2022       To Whom It May Concern:    Please excuse [employer] from work on 4/21 and 4/22/2022  If you have any questions or concerns, please don't hesitate to call  Sincerely,        Alena Suarez PA-C    CC: Chaka Gilliam

## 2022-04-21 NOTE — PATIENT INSTRUCTIONS
Flonase as prescribed  Over the counter cold medication as needed  Vitamin D3 2000 IU daily  Vitamin C 1000mg twice per day  Multivitamin daily  Fluids and rest  Tylenol/Ibuprofen for pain/fever  Warm compresses over sinuses  Steam treatment (utilize proper safety precautions when in contact with hot water/steam)  Follow up with PCP in 3-5 days  Proceed to  ER if symptoms worsen  Cold Symptoms   WHAT YOU NEED TO KNOW:   A cold is an infection caused by a virus  The infection causes your upper respiratory system to become inflamed  Common symptoms of a cold include sneezing, dry throat, a stuffy nose, headache, watery eyes, and a cough  Your cough may be dry, or you may cough up mucus  You may also have muscle aches, joint pain, and tiredness  Rarely, you may have a fever  Most colds go away without treatment  DISCHARGE INSTRUCTIONS:   Return to the emergency department if:   · You have increased tiredness and weakness  · You are unable to eat  · Your heart is beating much faster than usual for you  · You see white spots in the back of your throat and your neck is swollen and sore to the touch  · You see pinpoint or larger reddish-purple dots on your skin  Contact your healthcare provider if:   · You have a fever higher than 102°F (38 9°C)  · You have new or worsening shortness of breath  · You have thick nasal drainage for more than 2 days  · Your symptoms do not improve or get worse within 5 days  · You have questions or concerns about your condition or care  Medicines: The following medicines may be suggested by your healthcare provider to decrease your cold symptoms  These medicines are available without a doctor's order  Ask which medicines to take and when to take them  Follow directions  · NSAIDs or acetaminophen  help to bring down a fever or decrease pain  · Decongestants  help decrease nasal stuffiness       · Antihistamines  help decrease sneezing and a runny nose      · Cough suppressants  help decrease how much you cough  · Expectorants  help loosen mucus so you can cough it up  · Take your medicine as directed  Contact your healthcare provider if you think your medicine is not helping or if you have side effects  Tell him of her if you are allergic to any medicine  Keep a list of the medicines, vitamins, and herbs you take  Include the amounts, and when and why you take them  Bring the list or the pill bottles to follow-up visits  Carry your medicine list with you in case of an emergency  Symptom relief: The following may help relieve cold symptoms, such as a dry throat and congestion:  · Gargle with mouthwash or warm salt water as directed  · Suck on throat lozenges or hard candy  · Use a cold or warm vaporizer or humidifier to ease your breathing  · Rest for at least 2 days and then as needed to decrease tiredness and weakness  · Use petroleum based jelly around your nostrils to decrease irritation from blowing your nose  Drink liquids:  Liquids will help thin and loosen thick mucus so you can cough it up  Liquids will also keep you hydrated  Ask your healthcare provider which liquids are best for you and how much to drink each day  Prevent the spread of germs: You can spread your cold germs to others for at least 3 days after your symptoms start  Wash your hands often  Do not share items, such as eating utensils  Cover your nose and mouth when you cough or sneeze using the crook of your elbow instead of your hands  Throw used tissues in the garbage  Do not smoke:  Smoking may worsen your symptoms and increase the length of time you feel sick  Talk with your healthcare provider if you need help to stop smoking  Follow up with your doctor as directed:  Write down your questions so you remember to ask them during your visits     © Copyright [employer] 2022 Information is for End User's use only and may not be sold, redistributed or otherwise used for commercial purposes  All illustrations and images included in CareNotes® are the copyrighted property of [employer]  or [employer]  The above information is an  only  It is not intended as medical advice for individual conditions or treatments  Talk to your doctor, nurse or pharmacist before following any medical regimen to see if it is safe and effective for you

## 2022-04-21 NOTE — PROGRESS NOTES
[facility]        NAME: Emily Go is a 38 y o  female  : 1980    MRN: [id number]  DATE: 2022  TIME: 1:36 PM    Assessment and Plan   Acute respiratory disease [J06 9]  1  Acute respiratory disease  fluticasone (FLONASE) 50 mcg/act nasal spray       Patient Instructions     Flonase as prescribed  Over the counter cold medication as needed  Vitamin D3 2000 IU daily  Vitamin C 1000mg twice per day  Multivitamin daily  Fluids and rest  Tylenol/Ibuprofen for pain/fever  Warm compresses over sinuses  Steam treatment (utilize proper safety precautions when in contact with hot water/steam)  Follow up with PCP in 3-5 days  Proceed to  ER if symptoms worsen  Chief Complaint     Chief Complaint   Patient presents with    Cold Like Symptoms     Pt experiencing nasal congestion xs 3 days    Fever     Pt reports "low grade" fevers xs 1 day  Is taking tylenol and motrin with with good relief         History of Present Illness       Home COVID test negative  URI   This is a new problem  Episode onset: 2 days  Associated symptoms include congestion, ear pain, sinus pain and a sore throat  Pertinent negatives include no abdominal pain, coughing, diarrhea, headaches, nausea, rash, rhinorrhea, sneezing, vomiting or wheezing  She has tried acetaminophen and NSAIDs for the symptoms  Review of Systems   Review of Systems   Constitutional: Positive for fatigue and fever (Tmax 102)  Negative for chills  HENT: Positive for congestion, ear pain, sinus pressure, sinus pain and sore throat  Negative for ear discharge, postnasal drip, rhinorrhea, sneezing and trouble swallowing  Respiratory: Negative for cough, chest tightness, shortness of breath and wheezing  Gastrointestinal: Negative for abdominal pain, diarrhea, nausea and vomiting  Musculoskeletal: Negative for myalgias  Skin: Negative for rash  Neurological: Negative for light-headedness and headaches           Current Medications       Current Outpatient Medications:     albuterol (PROVENTIL HFA,VENTOLIN HFA) 90 mcg/act inhaler, Inhale 1 puff every 6 (six) hours as needed (Patient not taking: Reported on 1/18/2022 ), Disp: , Rfl:     fluticasone (FLONASE) 50 mcg/act nasal spray, 1 spray into each nostril daily, Disp: 16 g, Rfl: 0    loratadine (CLARITIN) 10 mg tablet, Take 10 mg by mouth daily, Disp: , Rfl:     melatonin 3 mg, Take 3 mg by mouth (Patient not taking: Reported on 1/18/2022 ), Disp: , Rfl:     meloxicam (Mobic) 15 mg tablet, Take 1 tablet (15 mg total) by mouth daily, Disp: 30 tablet, Rfl: 2    ondansetron (ZOFRAN) 4 mg tablet, Take 1 tablet (4 mg total) by mouth every 8 (eight) hours as needed for nausea or vomiting (Patient not taking: Reported on 1/18/2022 ), Disp: 20 tablet, Rfl: 0    Current Allergies     Allergies as of 04/21/2022    (No Known Allergies)            The following portions of the patient's history were reviewed and updated as appropriate: allergies, current medications, past family history, past medical history, past social history, past surgical history and problem list      Past Medical History:   Diagnosis Date    GERD (gastroesophageal reflux disease)     Herpes     Psychiatric disorder     depression, anxiety       Past Surgical History:   Procedure Laterality Date    KNEE SURGERY         History reviewed  No pertinent family history  Medications have been verified  Objective   /90   Pulse 82   Temp 98 6 °F (37 °C) (Oral)   Resp 18   Wt 68 kg (150 lb)   SpO2 98%   BMI 23 49 kg/m²   No LMP recorded  Physical Exam     Physical Exam  Vitals reviewed  Constitutional:       General: She is not in acute distress  Appearance: She is well-developed  She is not diaphoretic  HENT:      Head: Normocephalic        Comments: B/L maxillary sinus TTP     Right Ear: Tympanic membrane and external ear normal       Left Ear: Tympanic membrane and external ear normal       Nose: Nose normal       Mouth/Throat:      Pharynx: No oropharyngeal exudate or posterior oropharyngeal erythema  Eyes:      Conjunctiva/sclera: Conjunctivae normal    Cardiovascular:      Rate and Rhythm: Normal rate and regular rhythm  Heart sounds: Normal heart sounds  No murmur heard  No friction rub  No gallop  Pulmonary:      Effort: Pulmonary effort is normal  No respiratory distress  Breath sounds: Normal breath sounds  No wheezing or rales  Chest:      Chest wall: No tenderness  Lymphadenopathy:      Cervical: No cervical adenopathy  Skin:     General: Skin is warm  Neurological:      Mental Status: She is alert and oriented to person, place, and time  Psychiatric:         Behavior: Behavior normal          Thought Content:  Thought content normal          Judgment: Judgment normal

## 2022-08-29 ENCOUNTER — OFFICE VISIT (OUTPATIENT)
Dept: URGENT CARE | Facility: MEDICAL CENTER | Age: 42
End: 2022-08-29
Payer: COMMERCIAL

## 2022-08-29 VITALS
BODY MASS INDEX: 23.79 KG/M2 | RESPIRATION RATE: 18 BRPM | HEIGHT: 68 IN | HEART RATE: 65 BPM | SYSTOLIC BLOOD PRESSURE: 122 MMHG | WEIGHT: 157 LBS | TEMPERATURE: 98.6 F | OXYGEN SATURATION: 98 % | DIASTOLIC BLOOD PRESSURE: 70 MMHG

## 2022-08-29 DIAGNOSIS — J01.90 ACUTE SINUSITIS, RECURRENCE NOT SPECIFIED, UNSPECIFIED LOCATION: Primary | ICD-10-CM

## 2022-08-29 PROCEDURE — 99214 OFFICE O/P EST MOD 30 MIN: CPT | Performed by: PHYSICIAN ASSISTANT

## 2022-08-29 RX ORDER — MIRTAZAPINE 15 MG/1
15 TABLET, FILM COATED ORAL
COMMUNITY

## 2022-08-29 RX ORDER — AMOXICILLIN AND CLAVULANATE POTASSIUM 875; 125 MG/1; MG/1
1 TABLET, FILM COATED ORAL EVERY 12 HOURS SCHEDULED
Qty: 14 TABLET | Refills: 0 | Status: CANCELLED | OUTPATIENT
Start: 2022-08-29 | End: 2022-09-05

## 2022-08-29 RX ORDER — AMOXICILLIN 500 MG/1
500 CAPSULE ORAL EVERY 8 HOURS SCHEDULED
Qty: 21 CAPSULE | Refills: 0 | Status: SHIPPED | OUTPATIENT
Start: 2022-08-29 | End: 2022-09-05

## 2022-08-29 NOTE — PROGRESS NOTES
[address]        NAME: Imani Schultz is a 38 y o  female  : 1980    MRN: [id number]  DATE: 2022  TIME: 12:40 PM    Assessment and Plan   Acute sinusitis, recurrence not specified, unspecified location [J01 90]  1  Acute sinusitis, recurrence not specified, unspecified location  amoxicillin (AMOXIL) 500 mg capsule         Patient Instructions     Augmentin as prescribed  Flonase over the counter  Tylenol Sinus over the counter  Warm compresses over sinuses  Steam treatment (practice proper safety precautions when handing hot liquids/steam)  Over the counter saline nasal spray  Follow up with PCP in 3-5 days  Proceed to  ER if symptoms worsen  Eat yogurt with live and active cultures and/or take a probiotic at least 3 hours before or after antibiotic dose  Monitor stool for diarrhea and/or blood  If this occurs, contact primary care doctor ASAP  Chief Complaint     Chief Complaint   Patient presents with    Cold Like Symptoms     Sinus pressure and sinus congestion, sore throat x 2 weeks          History of Present Illness       URI   This is a new problem  Episode onset: 2 weeks  Associated symptoms include congestion and a sore throat  Pertinent negatives include no abdominal pain, coughing, diarrhea, ear pain, headaches, nausea, rash, rhinorrhea, sinus pain, sneezing, vomiting or wheezing  She has tried antihistamine for the symptoms  Review of Systems   Review of Systems   Constitutional: Negative for chills, fatigue and fever  HENT: Positive for congestion, postnasal drip, sinus pressure and sore throat  Negative for ear discharge, ear pain, rhinorrhea, sinus pain, sneezing and trouble swallowing  Respiratory: Negative for cough, chest tightness, shortness of breath and wheezing  Gastrointestinal: Negative for abdominal pain, diarrhea, nausea and vomiting  Musculoskeletal: Negative for myalgias  Skin: Negative for rash     Allergic/Immunologic: Positive for environmental allergies  Neurological: Negative for light-headedness and headaches  Current Medications       Current Outpatient Medications:     albuterol (PROVENTIL HFA,VENTOLIN HFA) 90 mcg/act inhaler, Inhale 1 puff every 6 (six) hours as needed, Disp: , Rfl:     amoxicillin (AMOXIL) 500 mg capsule, Take 1 capsule (500 mg total) by mouth every 8 (eight) hours for 7 days, Disp: 21 capsule, Rfl: 0    fluticasone (FLONASE) 50 mcg/act nasal spray, 1 spray into each nostril daily, Disp: 16 g, Rfl: 0    loratadine (CLARITIN) 10 mg tablet, Take 10 mg by mouth daily, Disp: , Rfl:     melatonin 3 mg, Take 3 mg by mouth, Disp: , Rfl:     meloxicam (Mobic) 15 mg tablet, Take 1 tablet (15 mg total) by mouth daily, Disp: 30 tablet, Rfl: 2    mirtazapine (REMERON) 15 mg tablet, Take 15 mg by mouth daily at bedtime, Disp: , Rfl:     ondansetron (ZOFRAN) 4 mg tablet, Take 1 tablet (4 mg total) by mouth every 8 (eight) hours as needed for nausea or vomiting, Disp: 20 tablet, Rfl: 0    Current Allergies     Allergies as of 08/29/2022    (No Known Allergies)            The following portions of the patient's history were reviewed and updated as appropriate: allergies, current medications, past family history, past medical history, past social history, past surgical history and problem list      Past Medical History:   Diagnosis Date    GERD (gastroesophageal reflux disease)     Herpes     Psychiatric disorder     depression, anxiety       Past Surgical History:   Procedure Laterality Date    KNEE SURGERY         No family history on file  Medications have been verified  Objective   /70   Pulse 65   Temp 98 6 °F (37 °C)   Resp 18   Ht 5' 8" (1 727 m)   Wt 71 2 kg (157 lb)   SpO2 98%   BMI 23 87 kg/m²   No LMP recorded  Physical Exam     Physical Exam  Vitals reviewed  Constitutional:       General: She is not in acute distress       Appearance: She is well-developed  She is not diaphoretic  HENT:      Head: Normocephalic  Comments: R sided frontal and maxillary sinus TTP     Right Ear: Tympanic membrane and external ear normal       Left Ear: Tympanic membrane and external ear normal       Nose: Nose normal       Mouth/Throat:      Pharynx: No oropharyngeal exudate or posterior oropharyngeal erythema  Eyes:      Conjunctiva/sclera: Conjunctivae normal    Cardiovascular:      Rate and Rhythm: Normal rate and regular rhythm  Heart sounds: Normal heart sounds  No murmur heard  No friction rub  No gallop  Pulmonary:      Effort: Pulmonary effort is normal  No respiratory distress  Breath sounds: Normal breath sounds  No wheezing or rales  Chest:      Chest wall: No tenderness  Lymphadenopathy:      Cervical: No cervical adenopathy  Skin:     General: Skin is warm  Neurological:      Mental Status: She is alert and oriented to person, place, and time  Psychiatric:         Behavior: Behavior normal          Thought Content:  Thought content normal          Judgment: Judgment normal

## 2022-08-29 NOTE — PATIENT INSTRUCTIONS
Amoxicillin as prescribed  Flonase over the counter  Tylenol Sinus over the counter  Warm compresses over sinuses  Steam treatment (practice proper safety precautions when handing hot liquids/steam)  Over the counter saline nasal spray  Follow up with PCP in 3-5 days  Proceed to  ER if symptoms worsen  Eat yogurt with live and active cultures and/or take a probiotic at least 3 hours before or after antibiotic dose  Monitor stool for diarrhea and/or blood  If this occurs, contact primary care doctor ASAP

## 2022-08-29 NOTE — LETTER
August 29, 2022     Patient: Simon Regalado   YOB: 1980   Date of Visit: 8/29/2022       To Whom It May Concern: Callie Medeiros was seen on 8/29/2022  Please excuse her from work at this time  She may return provided symptoms are improving and she has been fever free for 24 hours without fever reducing medications  If you have any questions or concerns, please don't hesitate to call           Sincerely,        Isaac Tsai PA-C    CC: No Recipients

## 2022-09-09 ENCOUNTER — OFFICE VISIT (OUTPATIENT)
Dept: URGENT CARE | Facility: MEDICAL CENTER | Age: 42
End: 2022-09-09
Payer: COMMERCIAL

## 2022-09-09 VITALS
RESPIRATION RATE: 18 BRPM | HEART RATE: 82 BPM | DIASTOLIC BLOOD PRESSURE: 70 MMHG | HEIGHT: 68 IN | WEIGHT: 148 LBS | TEMPERATURE: 97.7 F | BODY MASS INDEX: 22.43 KG/M2 | OXYGEN SATURATION: 97 % | SYSTOLIC BLOOD PRESSURE: 116 MMHG

## 2022-09-09 DIAGNOSIS — J02.9 ACUTE PHARYNGITIS, UNSPECIFIED ETIOLOGY: Primary | ICD-10-CM

## 2022-09-09 PROCEDURE — 99213 OFFICE O/P EST LOW 20 MIN: CPT | Performed by: PHYSICIAN ASSISTANT

## 2022-09-09 PROCEDURE — S9083 URGENT CARE CENTER GLOBAL: HCPCS | Performed by: PHYSICIAN ASSISTANT

## 2022-09-09 PROCEDURE — G0381 LEV 2 HOSP TYPE B ED VISIT: HCPCS | Performed by: PHYSICIAN ASSISTANT

## 2022-09-09 RX ORDER — CEFDINIR 300 MG/1
300 CAPSULE ORAL EVERY 12 HOURS SCHEDULED
Qty: 20 CAPSULE | Refills: 0 | Status: SHIPPED | OUTPATIENT
Start: 2022-09-09 | End: 2022-09-19

## 2022-09-09 NOTE — LETTER
September 9, 2022     Patient: Savage Darling   YOB: 1980   Date of Visit: 9/9/2022       To Whom It May Concern: It is my medical opinion that Len Brewster No work 09/09/2022  If you have any questions or concerns, please don't hesitate to call           Sincerely,        Lucy Faye PA-C    CC: No Recipients

## 2022-09-09 NOTE — PROGRESS NOTES
[facility]        NAME: Presley Lindsey is a 38 y o  female  : 1980    MRN: [id number]  DATE: 2022  TIME: 10:50 AM    Assessment and Plan   Acute pharyngitis, unspecified etiology [J02 9]  1  Acute pharyngitis, unspecified etiology  cefdinir (OMNICEF) 300 mg capsule         Patient Instructions       Follow up with PCP in 3-5 days  Proceed to  ER if symptoms worsen  Chief Complaint     Chief Complaint   Patient presents with    Sore Throat     Swollen glands and loss of voice started yesterday tested positive for covid last week          History of Present Illness       Patient has been taking amoxicillin for a sinus infection  Two days after seen here her symptoms worsen she did a home COVID test which was positive  Those symptoms have improved, however, her sore throat has worsened  She is taking Motrin for the sore throat, therefore, unsure if there is fever  Yesterday, neck glands were very swollen  Review of Systems   Review of Systems   Constitutional: Positive for fatigue  HENT: Positive for congestion, postnasal drip and sore throat  Respiratory: Negative for cough  Gastrointestinal: Positive for diarrhea, nausea and vomiting  Musculoskeletal: Negative for myalgias  Skin: Negative for rash  Neurological: Negative for headaches           Current Medications       Current Outpatient Medications:     cefdinir (OMNICEF) 300 mg capsule, Take 1 capsule (300 mg total) by mouth every 12 (twelve) hours for 10 days, Disp: 20 capsule, Rfl: 0    fluticasone (FLONASE) 50 mcg/act nasal spray, 1 spray into each nostril daily, Disp: 16 g, Rfl: 0    loratadine (CLARITIN) 10 mg tablet, Take 10 mg by mouth daily, Disp: , Rfl:     meloxicam (Mobic) 15 mg tablet, Take 1 tablet (15 mg total) by mouth daily, Disp: 30 tablet, Rfl: 2    mirtazapine (REMERON) 15 mg tablet, Take 15 mg by mouth daily at bedtime, Disp: , Rfl:     albuterol (PROVENTIL HFA,VENTOLIN HFA) 90 mcg/act inhaler, Inhale 1 puff every 6 (six) hours as needed (Patient not taking: Reported on 9/9/2022), Disp: , Rfl:     melatonin 3 mg, Take 3 mg by mouth (Patient not taking: Reported on 9/9/2022), Disp: , Rfl:     ondansetron (ZOFRAN) 4 mg tablet, Take 1 tablet (4 mg total) by mouth every 8 (eight) hours as needed for nausea or vomiting (Patient not taking: Reported on 9/9/2022), Disp: 20 tablet, Rfl: 0    Current Allergies     Allergies as of 09/09/2022    (No Known Allergies)            The following portions of the patient's history were reviewed and updated as appropriate: allergies, current medications, past family history, past medical history, past social history, past surgical history and problem list      Past Medical History:   Diagnosis Date    GERD (gastroesophageal reflux disease)     Herpes     Psychiatric disorder     depression, anxiety       Past Surgical History:   Procedure Laterality Date    KNEE SURGERY         No family history on file  Medications have been verified  Objective   /70   Pulse 82   Temp 97 7 °F (36 5 °C)   Resp 18   Ht 5' 8" (1 727 m)   Wt 67 1 kg (148 lb)   SpO2 97%   BMI 22 50 kg/m²   No LMP recorded  Physical Exam     Physical Exam  Vitals and nursing note reviewed  Constitutional:       Appearance: She is well-developed  HENT:      Head: Normocephalic and atraumatic  Right Ear: Tympanic membrane normal       Left Ear: Tympanic membrane normal       Mouth/Throat:      Mouth: Mucous membranes are moist       Pharynx: Uvula midline  Posterior oropharyngeal erythema present  No oropharyngeal exudate or uvula swelling  Tonsils: No tonsillar exudate  Eyes:      Conjunctiva/sclera: Conjunctivae normal    Cardiovascular:      Rate and Rhythm: Normal rate and regular rhythm  Heart sounds: Normal heart sounds  Pulmonary:      Effort: Pulmonary effort is normal       Breath sounds: Normal breath sounds  Musculoskeletal:      Cervical back: Neck supple  Lymphadenopathy:      Cervical: No cervical adenopathy  Skin:     General: Skin is warm  Neurological:      Mental Status: She is alert  no

## 2023-02-06 ENCOUNTER — OFFICE VISIT (OUTPATIENT)
Dept: OBGYN CLINIC | Facility: CLINIC | Age: 43
End: 2023-02-06

## 2023-02-06 VITALS
SYSTOLIC BLOOD PRESSURE: 100 MMHG | BODY MASS INDEX: 23.79 KG/M2 | HEIGHT: 68 IN | WEIGHT: 157 LBS | DIASTOLIC BLOOD PRESSURE: 68 MMHG

## 2023-02-06 DIAGNOSIS — Z12.31 ENCOUNTER FOR SCREENING MAMMOGRAM FOR MALIGNANT NEOPLASM OF BREAST: ICD-10-CM

## 2023-02-06 DIAGNOSIS — Z01.419 ENCOUNTER FOR WELL WOMAN EXAM WITH ROUTINE GYNECOLOGICAL EXAM: Primary | ICD-10-CM

## 2023-02-06 RX ORDER — 1.1% SODIUM FLUORIDE PRESCRIPTION DENTAL CREAM 5 MG/G
CREAM DENTAL
COMMUNITY
Start: 2023-01-20

## 2023-02-06 NOTE — PROGRESS NOTES
[facility]  [address], [place]    ASSESSMENT/PLAN: Carlo Shrestha is a 38 y o  [id number] who presents for annual gynecologic exam     Encounter for routine gynecologic examination  - Routine well woman exam completed today  - Cervical Cancer Screening: Current ASCCP Guidelines reviewed  Last Pap: Not on file   Next Pap Due: today  - HPV Vaccination status: Not immunized  - STI screening offered including HIV: not done  - Contraceptive counseling discussed  Current contraception: sterilization   - Breast Cancer Screening: Last Mammogram Not on file, mammo ordered    Additional problems addressed at this visit:  1  Encounter for well woman exam with routine gynecological exam  -     Liquid-based pap, screening    2  Encounter for screening mammogram for malignant neoplasm of breast  -     Mammo screening bilateral w 3d & cad; Future          CC:  Annual Gynecologic Examination    HPI: Carlo Shrestha is a 38 y o  [id number] who presents for annual gynecologic examination  HPI  She reports  no new changes to her health  She reports no breast concerns  She gets regular periods  She has no vaginal discharge, vulvar or vaginal lesions, pelvic pain, or abnormal bleeding  The following portions of the patient's history were reviewed and updated as appropriate: allergies, current medications, past family history, past medical history, obstetric history, gynecologic history, past social history, past surgical history and problem list     Review of Systems   Constitutional: Negative  HENT: Negative  Eyes: Negative  Respiratory: Negative  Cardiovascular: Negative  Gastrointestinal: Negative  Genitourinary: Negative  Musculoskeletal: Negative  All other systems reviewed and are negative  Objective:  /68 (BP Location: Left arm)   Ht 5' 8" (1 727 m)   Wt 71 2 kg (157 lb)   LMP 01/23/2023   BMI 23 87 kg/m²    Physical Exam  Vitals reviewed  Constitutional:       General: She is not in acute distress  Appearance: She is well-developed  HENT:      Head: Normocephalic and atraumatic  Nose: Nose normal    Cardiovascular:      Rate and Rhythm: Normal rate  Pulmonary:      Effort: Pulmonary effort is normal  No respiratory distress  Chest:   Breasts:     Breasts are symmetrical       Right: Normal  No mass, nipple discharge, skin change or tenderness  Left: Normal  No mass, nipple discharge, skin change or tenderness  Abdominal:      General: There is no distension  Palpations: Abdomen is soft  There is no mass  Tenderness: There is no abdominal tenderness  There is no guarding or rebound  Genitourinary:     General: Normal vulva  Exam position: Lithotomy position  Labia:         Right: No lesion  Left: No lesion  Urethra: No prolapse (urethral meatus normal)  Vagina: Normal  No vaginal discharge, erythema or bleeding  Cervix: Normal       Uterus: Normal        Adnexa: Right adnexa normal and left adnexa normal    Musculoskeletal:         General: Normal range of motion  Cervical back: Normal range of motion  Lymphadenopathy:      Upper Body:      Right upper body: No supraclavicular, axillary or pectoral adenopathy  Left upper body: No supraclavicular, axillary or pectoral adenopathy  Lower Body: No right inguinal adenopathy  No left inguinal adenopathy  Skin:     General: Skin is warm and dry  Neurological:      Mental Status: She is alert and oriented to person, place, and time  Psychiatric:         Behavior: Behavior normal          Thought Content:  Thought content normal          Judgment: Judgment normal              Shoshana Moon MD  [facility]  02/06/23 8:46 AM

## 2023-02-07 ENCOUNTER — HOSPITAL ENCOUNTER (OUTPATIENT)
Dept: MAMMOGRAPHY | Facility: HOSPITAL | Age: 43
Discharge: HOME | End: 2023-02-07
Attending: OBSTETRICS & GYNECOLOGY

## 2023-02-07 VITALS — BODY MASS INDEX: 23.79 KG/M2 | WEIGHT: 157 LBS | HEIGHT: 68 IN

## 2023-02-07 DIAGNOSIS — Z12.31 ENCOUNTER FOR SCREENING MAMMOGRAM FOR MALIGNANT NEOPLASM OF BREAST: ICD-10-CM

## 2023-02-07 LAB
HPV HR 12 DNA CVX QL NAA+PROBE: NEGATIVE
HPV16 DNA CVX QL NAA+PROBE: NEGATIVE
HPV18 DNA CVX QL NAA+PROBE: NEGATIVE

## 2023-02-10 LAB
LAB AP GYN PRIMARY INTERPRETATION: NORMAL
Lab: NORMAL

## 2023-02-23 ENCOUNTER — OFFICE VISIT (OUTPATIENT)
Dept: FAMILY MEDICINE CLINIC | Facility: CLINIC | Age: 43
End: 2023-02-23

## 2023-02-23 VITALS
TEMPERATURE: 97.4 F | WEIGHT: 160.8 LBS | SYSTOLIC BLOOD PRESSURE: 124 MMHG | BODY MASS INDEX: 24.37 KG/M2 | HEART RATE: 78 BPM | DIASTOLIC BLOOD PRESSURE: 78 MMHG | RESPIRATION RATE: 18 BRPM | HEIGHT: 68 IN

## 2023-02-23 DIAGNOSIS — J30.89 NON-SEASONAL ALLERGIC RHINITIS, UNSPECIFIED TRIGGER: ICD-10-CM

## 2023-02-23 DIAGNOSIS — Z00.00 ANNUAL PHYSICAL EXAM: Primary | ICD-10-CM

## 2023-02-23 DIAGNOSIS — Z00.00 ENCOUNTER FOR PREVENTIVE CARE: ICD-10-CM

## 2023-02-23 DIAGNOSIS — M17.11 PRIMARY OSTEOARTHRITIS OF RIGHT KNEE: ICD-10-CM

## 2023-02-23 DIAGNOSIS — Z13.6 SCREENING FOR CARDIOVASCULAR CONDITION: ICD-10-CM

## 2023-02-23 PROBLEM — R35.0 URINARY FREQUENCY: Status: RESOLVED | Noted: 2018-03-17 | Resolved: 2023-02-23

## 2023-02-23 RX ORDER — FLUTICASONE PROPIONATE 50 MCG
1 SPRAY, SUSPENSION (ML) NASAL DAILY
Qty: 16 G | Refills: 0 | Status: SHIPPED | OUTPATIENT
Start: 2023-02-23

## 2023-02-23 RX ORDER — MELOXICAM 15 MG/1
15 TABLET ORAL DAILY
Qty: 30 TABLET | Refills: 2 | Status: SHIPPED | OUTPATIENT
Start: 2023-02-23

## 2023-02-23 NOTE — PROGRESS NOTES
Name: Harpreet Munguia      : 1980      MRN: [id number]  Encounter Provider: Rola Astorga DO  Encounter Date: 2023   Encounter department: [facility] [address]     1  Annual physical exam  Pt has upcoming dx mammo scheduled and will followup pending results   2  Non-seasonal allergic rhinitis, unspecified trigger  -     fluticasone (FLONASE) 50 mcg/act nasal spray; 1 spray into each nostril daily  Stay hydrated     3  Primary osteoarthritis of right knee  -     meloxicam (Mobic) 15 mg tablet; Take 1 tablet (15 mg total) by mouth daily  Known knee djd Currently stable and will followup with ortho as needed   Take rx with food as needed     4  Screening for cardiovascular condition  -     CBC and differential; Future  -     Comprehensive metabolic panel; Future  -     Lipid panel; Future    5  Encounter for preventive care  -     Vitamin D 25 hydroxy; Future    Rx for fbw  Gyn utd     Depression Screening and Follow-up Plan: Patient was screened for depression during today's encounter  They screened negative with a PHQ-2 score of 0  Tobacco Cessation Counseling: Tobacco cessation counseling was provided  The patient is sincerely urged to quit consumption of tobacco  She is not ready to quit tobacco          Subjective      HPI   Np visit Pt has hx of osteoarthritis to the knees and has seen ortho in past had b/l scopes Pt has chronic allergies and uses Flonase  She had gyn visit recently and baseline mammo She is scheduled for additional imaging based on mammo report Pt denies any pain or breast issues she is aware of No family hx of breast cancer She currently has stress with her teenage daughter and has a meeting at school today to discuss She also has challenges with her ex and care of her daughter which has been stressful Pt does speak with therapist every other week and the Remeron has been helpful   Review of Systems   Constitutional: Negative for chills and fever  HENT: Negative  Eyes: Negative for visual disturbance  Respiratory: Negative for cough and shortness of breath  Cardiovascular: Negative for chest pain, palpitations and leg swelling  Gastrointestinal: Negative for abdominal distention and abdominal pain  Genitourinary: Negative  Negative for menstrual problem  Musculoskeletal: Positive for arthralgias  Neurological: Negative for dizziness, light-headedness and headaches  Psychiatric/Behavioral: Negative for sleep disturbance  The patient is not nervous/anxious  Current Outpatient Medications on File Prior to Visit   Medication Sig   • mirtazapine (REMERON) 15 mg tablet Take 15 mg by mouth daily at bedtime   • SF 5000 Plus 1 1 % CREA BRUSH TEETH ONCE DAILY   • [DISCONTINUED] fluticasone (FLONASE) 50 mcg/act nasal spray 1 spray into each nostril daily   • [DISCONTINUED] meloxicam (Mobic) 15 mg tablet Take 1 tablet (15 mg total) by mouth daily       Objective     /78   Pulse 78   Temp (!) 97 4 °F (36 3 °C) (Temporal)   Resp 18   Ht 5' 8" (1 727 m)   Wt 72 9 kg (160 lb 12 8 oz)   BMI 24 45 kg/m²     Physical Exam  Vitals reviewed  Constitutional:       General: She is not in acute distress  Appearance: Normal appearance  She is not ill-appearing, toxic-appearing or diaphoretic  HENT:      Head: Normocephalic and atraumatic  Right Ear: External ear normal       Left Ear: External ear normal       Nose: Nose normal       Mouth/Throat:      Mouth: Mucous membranes are moist    Eyes:      General: No scleral icterus  Extraocular Movements: Extraocular movements intact  Conjunctiva/sclera: Conjunctivae normal       Pupils: Pupils are equal, round, and reactive to light  Cardiovascular:      Rate and Rhythm: Normal rate and regular rhythm  Pulses: Normal pulses  Heart sounds: Normal heart sounds  Pulmonary:      Effort: Pulmonary effort is normal  No respiratory distress        Breath sounds: Normal breath sounds  No wheezing  Abdominal:      General: Bowel sounds are normal       Palpations: Abdomen is soft  Musculoskeletal:      Cervical back: Normal range of motion and neck supple  No rigidity  Right lower leg: No edema  Left lower leg: No edema  Lymphadenopathy:      Cervical: No cervical adenopathy  Skin:     General: Skin is warm and dry  Coloration: Skin is pale  Neurological:      General: No focal deficit present  Mental Status: She is alert and oriented to person, place, and time  Mental status is at baseline  Cranial Nerves: No cranial nerve deficit  Psychiatric:         Mood and Affect: Mood normal          Behavior: Behavior normal          Thought Content:  Thought content normal          Judgment: Judgment normal        Radha Leon DO

## 2023-03-09 ENCOUNTER — HOSPITAL ENCOUNTER (OUTPATIENT)
Dept: MAMMOGRAPHY | Facility: HOSPITAL | Age: 43
Discharge: HOME | End: 2023-03-09
Attending: OBSTETRICS & GYNECOLOGY

## 2023-03-09 ENCOUNTER — HOSPITAL ENCOUNTER (OUTPATIENT)
Dept: ULTRASOUND IMAGING | Facility: HOSPITAL | Age: 43
Discharge: HOME | End: 2023-03-09
Attending: OBSTETRICS & GYNECOLOGY

## 2023-03-09 VITALS — BODY MASS INDEX: 24.25 KG/M2 | WEIGHT: 160 LBS | HEIGHT: 68 IN

## 2023-03-09 DIAGNOSIS — R92.8 ABNORMAL MAMMOGRAM: ICD-10-CM

## 2023-03-16 ENCOUNTER — RX ONLY (OUTPATIENT)
Age: 43
Setting detail: RX ONLY
End: 2023-03-16

## 2023-03-16 ENCOUNTER — APPOINTMENT (RX ONLY)
Dept: URBAN - NONMETROPOLITAN AREA CLINIC 4 | Facility: CLINIC | Age: 43
Setting detail: DERMATOLOGY
End: 2023-03-16

## 2023-03-16 DIAGNOSIS — L71.8 OTHER ROSACEA: ICD-10-CM | Status: INADEQUATELY CONTROLLED

## 2023-03-16 PROCEDURE — 99204 OFFICE O/P NEW MOD 45 MIN: CPT

## 2023-03-16 PROCEDURE — ? PRESCRIPTION

## 2023-03-16 PROCEDURE — ? PHOTO-DOCUMENTATION

## 2023-03-16 PROCEDURE — ? COUNSELING

## 2023-03-16 PROCEDURE — ? TREATMENT REGIMEN

## 2023-03-16 RX ORDER — METRONIDAZOLE 10 MG/G
1% GEL TOPICAL QD
Qty: 1 | Refills: 3 | Status: ERX | COMMUNITY
Start: 2023-03-16

## 2023-03-16 RX ORDER — SULFACETAMIDE SODIUM 100 MG/ML
10% LIQUID TOPICAL QD
Qty: 1 | Refills: 3 | Status: ERX | COMMUNITY
Start: 2023-03-16

## 2023-03-16 RX ORDER — SULFACETAMIDE SODIUM 100 MG/ML
10% LIQUID TOPICAL QD
Qty: 1 | Refills: 3 | Status: ERX

## 2023-03-16 RX ORDER — DOXYCYCLINE HYCLATE 100 MG/1
100 MG CAPSULE, GELATIN COATED ORAL BID
Qty: 60 | Refills: 3 | Status: ERX | COMMUNITY
Start: 2023-03-16

## 2023-03-16 RX ADMIN — METRONIDAZOLE 1%: 10 GEL TOPICAL at 00:00

## 2023-03-16 RX ADMIN — SULFACETAMIDE SODIUM 10%: 100 LIQUID TOPICAL at 00:00

## 2023-03-16 RX ADMIN — DOXYCYCLINE HYCLATE 100 MG: 100 CAPSULE, GELATIN COATED ORAL at 00:00

## 2023-03-16 ASSESSMENT — SEVERITY ASSESSMENT OVERALL AMONG ALL PATIENTS
IN YOUR EXPERIENCE, AMONG ALL PATIENTS YOU HAVE SEEN WITH THIS CONDITION, HOW SEVERE IS THIS PATIENT'S CONDITION?: MILD TO MODERATE

## 2023-03-16 ASSESSMENT — LOCATION SIMPLE DESCRIPTION DERM
LOCATION SIMPLE: RIGHT CHEEK
LOCATION SIMPLE: LEFT CHEEK

## 2023-03-16 ASSESSMENT — LOCATION DETAILED DESCRIPTION DERM
LOCATION DETAILED: LEFT INFERIOR MEDIAL MALAR CHEEK
LOCATION DETAILED: RIGHT INFERIOR MEDIAL MALAR CHEEK

## 2023-03-16 ASSESSMENT — LOCATION ZONE DERM: LOCATION ZONE: FACE

## 2023-03-16 NOTE — PROCEDURE: TREATMENT REGIMEN
Detail Level: Zone
Initiate Treatment: Metronidazole gel QD, Ovace wash QD, and Doxycycline 100 mg BID.

## 2023-05-23 ENCOUNTER — OFFICE VISIT (OUTPATIENT)
Dept: FAMILY MEDICINE CLINIC | Facility: CLINIC | Age: 43
End: 2023-05-23

## 2023-05-23 VITALS
DIASTOLIC BLOOD PRESSURE: 78 MMHG | RESPIRATION RATE: 18 BRPM | BODY MASS INDEX: 23.89 KG/M2 | SYSTOLIC BLOOD PRESSURE: 124 MMHG | TEMPERATURE: 97.9 F | HEIGHT: 68 IN | HEART RATE: 78 BPM | WEIGHT: 157.6 LBS

## 2023-05-23 DIAGNOSIS — M17.11 PRIMARY OSTEOARTHRITIS OF RIGHT KNEE: Primary | ICD-10-CM

## 2023-05-23 DIAGNOSIS — J30.89 NON-SEASONAL ALLERGIC RHINITIS, UNSPECIFIED TRIGGER: ICD-10-CM

## 2023-05-23 RX ORDER — SODIUM SULFACETAMIDE 100 MG/ML
LIQUID TOPICAL
COMMUNITY

## 2023-05-23 RX ORDER — METRONIDAZOLE 10 MG/G
GEL TOPICAL
COMMUNITY
Start: 2023-03-16

## 2023-05-23 RX ORDER — FEXOFENADINE HCL 180 MG/1
180 TABLET ORAL DAILY
COMMUNITY

## 2023-05-23 RX ORDER — FLUTICASONE PROPIONATE 50 MCG
1 SPRAY, SUSPENSION (ML) NASAL DAILY
Qty: 16 G | Refills: 0 | Status: SHIPPED | OUTPATIENT
Start: 2023-05-23

## 2023-05-23 NOTE — PROGRESS NOTES
Name: Joya Reilly      : 1980      MRN: [id number]  Encounter Provider: Duyen Harris DO  Encounter Date: 2023   Encounter department: [address] [place] [address]     1  Primary osteoarthritis of right knee  Encouraged exercises     2  Non-seasonal allergic rhinitis, unspecified trigger  -     fluticasone (FLONASE) 50 mcg/act nasal spray; 1 spray into each nostril daily  Continue flonase         Depression Screening and Follow-up Plan: Patient was screened for depression during today's encounter  They screened negative with a PHQ-2 score of 0  Rto 6months     Subjective      HPI   Pt doing ok her daughter has changed to virtual learning and doing better overall which has been less stressful for the pt She does speak with psychiatrist and therapist which for now she will continue Sleep ok and uses Remeron which does help No acute issues Knee non limiting   Review of Systems   Constitutional: Negative for chills and fever  HENT: Negative  Eyes: Negative for visual disturbance  Respiratory: Negative for cough and shortness of breath  Cardiovascular: Negative for chest pain, palpitations and leg swelling  Gastrointestinal: Negative for abdominal distention and abdominal pain  Genitourinary: Negative  Musculoskeletal: Positive for arthralgias  Neurological: Negative for dizziness, light-headedness and headaches  Hematological: Negative for adenopathy  Psychiatric/Behavioral: Negative for sleep disturbance  The patient is not nervous/anxious          Current Outpatient Medications on File Prior to Visit   Medication Sig   • fexofenadine (ALLEGRA) 180 MG tablet Take 180 mg by mouth daily   • meloxicam (Mobic) 15 mg tablet Take 1 tablet (15 mg total) by mouth daily   • metroNIDAZOLE (METROGEL) 1 % gel APPLY THIN LAYER TOPICALLY TO AFFECTED AREA ON FACE ONCE DAILY   • mirtazapine (REMERON) 15 mg tablet Take 15 mg by mouth daily at bedtime   • SF 5000 Plus 1 1 "% CREA BRUSH TEETH ONCE DAILY   • Sulfacetamide Sodium (Sodium Sulfacetamide Wash) 10 % LIQD Apply topically   • [DISCONTINUED] fluticasone (FLONASE) 50 mcg/act nasal spray 1 spray into each nostril daily       Objective     /78   Pulse 78   Temp 97 9 °F (36 6 °C) (Temporal)   Resp 18   Ht 5' 8\" (1 727 m)   Wt 71 5 kg (157 lb 9 6 oz)   BMI 23 96 kg/m²     Physical Exam  Vitals reviewed  Constitutional:       General: She is not in acute distress  Appearance: Normal appearance  She is not ill-appearing, toxic-appearing or diaphoretic  HENT:      Head: Normocephalic and atraumatic  Right Ear: External ear normal       Left Ear: External ear normal       Nose: Nose normal       Mouth/Throat:      Mouth: Mucous membranes are moist    Eyes:      General: No scleral icterus  Extraocular Movements: Extraocular movements intact  Conjunctiva/sclera: Conjunctivae normal       Pupils: Pupils are equal, round, and reactive to light  Cardiovascular:      Rate and Rhythm: Normal rate and regular rhythm  Pulses: Normal pulses  Pulmonary:      Effort: Pulmonary effort is normal  No respiratory distress  Breath sounds: Normal breath sounds  No wheezing  Abdominal:      General: Bowel sounds are normal  There is no distension  Palpations: Abdomen is soft  There is no mass  Musculoskeletal:      Cervical back: Normal range of motion and neck supple  Right lower leg: No edema  Left lower leg: No edema  Lymphadenopathy:      Cervical: No cervical adenopathy  Skin:     General: Skin is warm and dry  Coloration: Skin is not jaundiced or pale  Neurological:      General: No focal deficit present  Mental Status: She is alert and oriented to person, place, and time  Mental status is at baseline  Cranial Nerves: No cranial nerve deficit  Sensory: No sensory deficit     Psychiatric:         Mood and Affect: Mood normal          Behavior: Behavior " normal          Thought Content:  Thought content normal          Judgment: Judgment normal        Elif Lin DO

## 2023-09-07 ENCOUNTER — TELEPHONE (OUTPATIENT)
Dept: FAMILY MEDICINE CLINIC | Facility: CLINIC | Age: 43
End: 2023-09-07

## 2023-09-07 DIAGNOSIS — M54.40 ACUTE BILATERAL LOW BACK PAIN WITH SCIATICA, SCIATICA LATERALITY UNSPECIFIED: Primary | ICD-10-CM

## 2023-09-07 RX ORDER — METHOCARBAMOL 750 MG/1
750 TABLET, FILM COATED ORAL EVERY 8 HOURS PRN
Qty: 30 TABLET | Refills: 0 | Status: SHIPPED | OUTPATIENT
Start: 2023-09-07

## 2023-09-07 NOTE — TELEPHONE ENCOUNTER
Patient has had back pain for her whole life, in the past she was prescribed Robaxin 750 mg. Last refill was from another doctor in 2017. She is having a flair up and only takes them prn. Does she need to come in for appt or can you prescribe some for her?

## 2023-09-14 ENCOUNTER — HOSPITAL ENCOUNTER (OUTPATIENT)
Dept: MAMMOGRAPHY | Facility: HOSPITAL | Age: 43
Discharge: HOME | End: 2023-09-14
Attending: OBSTETRICS & GYNECOLOGY
Payer: COMMERCIAL

## 2023-09-14 ENCOUNTER — HOSPITAL ENCOUNTER (OUTPATIENT)
Dept: ULTRASOUND IMAGING | Facility: HOSPITAL | Age: 43
Discharge: HOME | End: 2023-09-14
Attending: OBSTETRICS & GYNECOLOGY
Payer: COMMERCIAL

## 2023-09-14 VITALS — BODY MASS INDEX: 23.79 KG/M2 | HEIGHT: 68 IN | WEIGHT: 157 LBS

## 2023-09-14 DIAGNOSIS — R92.8 ABNORMAL MAMMOGRAM: ICD-10-CM

## 2023-09-14 PROCEDURE — 77066 DX MAMMO INCL CAD BI: CPT

## 2023-09-14 PROCEDURE — 76642 ULTRASOUND BREAST LIMITED: CPT

## 2023-09-14 PROCEDURE — G0279 TOMOSYNTHESIS, MAMMO: HCPCS

## 2023-10-14 DIAGNOSIS — J30.89 NON-SEASONAL ALLERGIC RHINITIS, UNSPECIFIED TRIGGER: ICD-10-CM

## 2023-10-15 RX ORDER — FLUTICASONE PROPIONATE 50 MCG
SPRAY, SUSPENSION (ML) NASAL
Qty: 16 G | Refills: 0 | Status: SHIPPED | OUTPATIENT
Start: 2023-10-15

## 2023-11-29 ENCOUNTER — OFFICE VISIT (OUTPATIENT)
Dept: FAMILY MEDICINE CLINIC | Facility: CLINIC | Age: 43
End: 2023-11-29
Payer: COMMERCIAL

## 2023-11-29 VITALS
BODY MASS INDEX: 23.16 KG/M2 | HEART RATE: 76 BPM | OXYGEN SATURATION: 99 % | SYSTOLIC BLOOD PRESSURE: 134 MMHG | WEIGHT: 152.8 LBS | RESPIRATION RATE: 18 BRPM | HEIGHT: 68 IN | TEMPERATURE: 97.6 F | DIASTOLIC BLOOD PRESSURE: 78 MMHG

## 2023-11-29 DIAGNOSIS — M17.11 PRIMARY OSTEOARTHRITIS OF RIGHT KNEE: ICD-10-CM

## 2023-11-29 DIAGNOSIS — J01.00 SUBACUTE MAXILLARY SINUSITIS: Primary | ICD-10-CM

## 2023-11-29 DIAGNOSIS — J30.89 NON-SEASONAL ALLERGIC RHINITIS, UNSPECIFIED TRIGGER: ICD-10-CM

## 2023-11-29 PROBLEM — N39.0 RECURRENT URINARY TRACT INFECTION: Status: RESOLVED | Noted: 2018-03-22 | Resolved: 2023-11-29

## 2023-11-29 PROCEDURE — 99214 OFFICE O/P EST MOD 30 MIN: CPT | Performed by: INTERNAL MEDICINE

## 2023-11-29 RX ORDER — LORATADINE 10 MG/1
10 TABLET ORAL DAILY
COMMUNITY

## 2023-11-29 RX ORDER — AMOXICILLIN AND CLAVULANATE POTASSIUM 875; 125 MG/1; MG/1
1 TABLET, FILM COATED ORAL 2 TIMES DAILY
Qty: 14 TABLET | Refills: 0 | Status: SHIPPED | OUTPATIENT
Start: 2023-11-29 | End: 2023-12-06

## 2023-11-29 NOTE — PROGRESS NOTES
Name: Meghann San      : 1980      MRN: [id number]  Encounter Provider: Ya Cates DO  Encounter Date: 2023   Encounter department: [address]     1. Subacute maxillary sinusitis  -     amoxicillin-clavulanate (AUGMENTIN) 875-125 mg per tablet; Take 1 tablet by mouth 2 (two) times a day for 7 days  Stay hydrated, rest Take antibx with food If sxs recur readily suggested ENT evaluation  2. Non-seasonal allergic rhinitis, unspecified trigger  Continue nasal spray and oral rx Stay hdyrated May need ENT followup ?allergy testing    3. Primary osteoarthritis of right knee  Sxs improved and encouraged pt to exercise at home for strengthening        Depression Screening and Follow-up Plan: Patient was screened for depression during today's encounter. They screened negative with a PHQ-2 score of 0. Rto 6 months  Subjective      HPI  Pt feels has sinus infection Had fever few days ago and her son was ill recently She has underlying allergies and takes daily meds Increased pnd and thick mucous Decrease appetite No sore throat No ear sxs Occasional cough Taking otc meds with minimal change No chest pain or sob Knee pain has resolved She is still looking for local work She has been going to therapy sessions which have been helpful and her daughter seems to be doing better and is in cyber school currently Her son's Dad passed away suddenly few months ago so she has been working thru that with him   Review of Systems   Constitutional:  Positive for chills and fever. HENT:  Positive for congestion and postnasal drip. Eyes:  Negative for visual disturbance. Respiratory:  Negative for cough and shortness of breath. Cardiovascular:  Negative for chest pain, palpitations and leg swelling. Neurological:  Negative for dizziness, light-headedness and headaches.        Current Outpatient Medications on File Prior to Visit   Medication Sig    fexofenadine (ALLEGRA) 180 MG tablet Take 180 mg by mouth daily    fluticasone (FLONASE) 50 mcg/act nasal spray Use 1 spray(s) in each nostril once daily    loratadine (CLARITIN) 10 mg tablet Take 10 mg by mouth daily    meloxicam (Mobic) 15 mg tablet Take 1 tablet (15 mg total) by mouth daily    methocarbamol (Robaxin-750) 750 mg tablet Take 1 tablet (750 mg total) by mouth every 8 (eight) hours as needed for muscle spasms    SF 5000 Plus 1.1 % CREA BRUSH TEETH ONCE DAILY    [DISCONTINUED] metroNIDAZOLE (METROGEL) 1 % gel APPLY THIN LAYER TOPICALLY TO AFFECTED AREA ON FACE ONCE DAILY (Patient not taking: Reported on 11/29/2023)    [DISCONTINUED] mirtazapine (REMERON) 15 mg tablet Take 15 mg by mouth daily at bedtime (Patient not taking: Reported on 11/29/2023)    [DISCONTINUED] Sulfacetamide Sodium (Sodium Sulfacetamide Wash) 10 % LIQD Apply topically (Patient not taking: Reported on 11/29/2023)       Objective     /78   Pulse 76   Temp 97.6 °F (36.4 °C) (Temporal)   Resp 18   Ht 5' 8" (1.727 m)   Wt 69.3 kg (152 lb 12.8 oz)   SpO2 99%   BMI 23.23 kg/m²     Physical Exam  Vitals and nursing note reviewed. Constitutional:       General: She is not in acute distress. Appearance: Normal appearance. She is not ill-appearing, toxic-appearing or diaphoretic. HENT:      Head: Normocephalic and atraumatic. Right Ear: External ear normal.      Left Ear: External ear normal.      Nose: Nose normal.      Mouth/Throat:      Mouth: Mucous membranes are moist.   Eyes:      General: No scleral icterus. Extraocular Movements: Extraocular movements intact. Conjunctiva/sclera: Conjunctivae normal.      Pupils: Pupils are equal, round, and reactive to light. Cardiovascular:      Rate and Rhythm: Normal rate and regular rhythm. Pulses: Normal pulses. Heart sounds: Normal heart sounds. No murmur heard. Pulmonary:      Effort: Pulmonary effort is normal. No respiratory distress.       Breath sounds: Normal breath sounds. No wheezing. Abdominal:      General: Bowel sounds are normal. There is no distension. Palpations: Abdomen is soft. Tenderness: There is no abdominal tenderness. Musculoskeletal:      Cervical back: Normal range of motion and neck supple. Right lower leg: No edema. Left lower leg: No edema. Lymphadenopathy:      Cervical: No cervical adenopathy. Skin:     General: Skin is warm and dry. Coloration: Skin is not jaundiced or pale. Neurological:      General: No focal deficit present. Mental Status: She is alert and oriented to person, place, and time. Mental status is at baseline. Psychiatric:         Mood and Affect: Mood normal.         Behavior: Behavior normal.         Thought Content:  Thought content normal.         Judgment: Judgment normal.       Bourbonnais Flakes, DO

## 2023-12-06 ENCOUNTER — OFFICE VISIT (OUTPATIENT)
Dept: URGENT CARE | Facility: MEDICAL CENTER | Age: 43
End: 2023-12-06
Payer: COMMERCIAL

## 2023-12-06 VITALS
TEMPERATURE: 98.6 F | RESPIRATION RATE: 18 BRPM | OXYGEN SATURATION: 98 % | DIASTOLIC BLOOD PRESSURE: 80 MMHG | HEART RATE: 84 BPM | WEIGHT: 152 LBS | SYSTOLIC BLOOD PRESSURE: 110 MMHG | HEIGHT: 68 IN | BODY MASS INDEX: 23.04 KG/M2

## 2023-12-06 DIAGNOSIS — R50.9 FEVER, UNSPECIFIED FEVER CAUSE: ICD-10-CM

## 2023-12-06 DIAGNOSIS — K52.9 GASTROENTERITIS: Primary | ICD-10-CM

## 2023-12-06 LAB
SARS-COV-2 AG UPPER RESP QL IA: NEGATIVE
VALID CONTROL: NORMAL

## 2023-12-06 PROCEDURE — S9088 SERVICES PROVIDED IN URGENT: HCPCS | Performed by: PHYSICIAN ASSISTANT

## 2023-12-06 PROCEDURE — 87811 SARS-COV-2 COVID19 W/OPTIC: CPT | Performed by: PHYSICIAN ASSISTANT

## 2023-12-06 PROCEDURE — 99212 OFFICE O/P EST SF 10 MIN: CPT | Performed by: PHYSICIAN ASSISTANT

## 2023-12-06 RX ORDER — ONDANSETRON 4 MG/1
4 TABLET, ORALLY DISINTEGRATING ORAL EVERY 6 HOURS PRN
Qty: 15 TABLET | Refills: 0 | Status: SHIPPED | OUTPATIENT
Start: 2023-12-06

## 2023-12-06 NOTE — PROGRESS NOTES
[facility]        NAME: Leda Rinne is a 41 y.o. female  : 1980    MRN: [id number]  DATE: 2023  TIME: 5:03 PM    Assessment and Plan   Gastroenteritis [K52.9]  1. Gastroenteritis  ondansetron (ZOFRAN-ODT) 4 mg disintegrating tablet      2. Fever, unspecified fever cause  Poct Covid 19 Rapid Antigen Test            Patient Instructions     [place] diet and gradually increase diet as tolerated. Refrain from dairy products until feeling better. Fluids  Tylenol as needed for fever or pain. If symptoms worsen have your self rechecked. If symptoms fail to improve follow-up with PCP. Follow up with PCP in 3-5 days. Proceed to  ER if symptoms worsen. Chief Complaint     Chief Complaint   Patient presents with   • Vomiting   • Diarrhea   • Chills         History of Present Illness       Patient seen at PCP office on 2023, and treated with Augmentin for acute sinusitis. She presents with continued fevers as high as 101 degrees. Yesterday, she had vomiting and diarrhea. Vomiting has resolved. Tolerating food and liquids today. Review of Systems   Review of Systems   Constitutional:  Positive for chills and fever (101.3). HENT:  Positive for postnasal drip and sore throat. Negative for congestion and rhinorrhea. Respiratory:  Negative for cough. Gastrointestinal:  Negative for diarrhea, nausea and vomiting. Musculoskeletal:  Negative for myalgias.          Current Medications       Current Outpatient Medications:   •  amoxicillin-clavulanate (AUGMENTIN) 875-125 mg per tablet, Take 1 tablet by mouth 2 (two) times a day for 7 days, Disp: 14 tablet, Rfl: 0  •  fexofenadine (ALLEGRA) 180 MG tablet, Take 180 mg by mouth daily, Disp: , Rfl:   •  fluticasone (FLONASE) 50 mcg/act nasal spray, Use 1 spray(s) in each nostril once daily, Disp: 16 g, Rfl: 0  •  loratadine (CLARITIN) 10 mg tablet, Take 10 mg by mouth daily, Disp: , Rfl:   •  meloxicam (Mobic) 15 mg tablet, Take 1 tablet (15 mg total) by mouth daily, Disp: 30 tablet, Rfl: 2  •  methocarbamol (Robaxin-750) 750 mg tablet, Take 1 tablet (750 mg total) by mouth every 8 (eight) hours as needed for muscle spasms, Disp: 30 tablet, Rfl: 0  •  ondansetron (ZOFRAN-ODT) 4 mg disintegrating tablet, Take 1 tablet (4 mg total) by mouth every 6 (six) hours as needed for nausea or vomiting, Disp: 15 tablet, Rfl: 0  •  SF 5000 Plus 1.1 % CREA, BRUSH TEETH ONCE DAILY, Disp: , Rfl:     Current Allergies     Allergies as of 12/06/2023   • (No Known Allergies)            The following portions of the patient's history were reviewed and updated as appropriate: allergies, current medications, past family history, past medical history, past social history, past surgical history and problem list.     Past Medical History:   Diagnosis Date   • GERD (gastroesophageal reflux disease)    • Herpes    • Psychiatric disorder     depression, anxiety   • Recurrent urinary tract infection 03/22/2018       Past Surgical History:   Procedure Laterality Date   • KNEE SURGERY     • TUBAL LIGATION         Family History   Problem Relation Age of Onset   • No Known Problems Mother    • Heart attack Father    • Stroke Father    • Drug abuse Sister    • No Known Problems Daughter    • Colon cancer Maternal Grandmother    • Heart attack Maternal Grandfather    • Dementia Paternal Grandmother    • Lung disease Paternal Grandfather    • No Known Problems Paternal Aunt    • No Known Problems Paternal Aunt          Medications have been verified. Objective   /80   Pulse 84   Temp 98.6 °F (37 °C)   Resp 18   Ht 5' 8" (1.727 m)   Wt 68.9 kg (152 lb)   SpO2 98%   BMI 23.11 kg/m²   No LMP recorded. Physical Exam     Physical Exam  Nursing note reviewed. Constitutional:       Appearance: Normal appearance. HENT:      Head: Normocephalic and atraumatic.       Right Ear: Tympanic membrane normal.      Left Ear: Tympanic membrane normal. Mouth/Throat:      Mouth: Mucous membranes are moist.      Pharynx: Oropharynx is clear. Eyes:      Conjunctiva/sclera: Conjunctivae normal.   Cardiovascular:      Rate and Rhythm: Normal rate and regular rhythm. Heart sounds: Normal heart sounds. Pulmonary:      Effort: Pulmonary effort is normal.      Breath sounds: Normal breath sounds. Musculoskeletal:      Cervical back: Neck supple. Lymphadenopathy:      Cervical: No cervical adenopathy. Skin:     General: Skin is warm. Neurological:      Mental Status: She is alert.

## 2023-12-06 NOTE — PATIENT INSTRUCTIONS
[place] diet and gradually increase diet as tolerated. Refrain from dairy products until feeling better. Fluids  Tylenol as needed for fever or pain. If symptoms worsen have your self rechecked. If symptoms fail to improve follow-up with PCP.

## 2024-01-20 ENCOUNTER — HOSPITAL ENCOUNTER (EMERGENCY)
Facility: HOSPITAL | Age: 44
Discharge: HOME | End: 2024-01-20
Attending: EMERGENCY MEDICINE
Payer: COMMERCIAL

## 2024-01-20 ENCOUNTER — APPOINTMENT (EMERGENCY)
Dept: RADIOLOGY | Facility: HOSPITAL | Age: 44
End: 2024-01-20
Payer: COMMERCIAL

## 2024-01-20 VITALS
RESPIRATION RATE: 16 BRPM | TEMPERATURE: 98.8 F | SYSTOLIC BLOOD PRESSURE: 131 MMHG | HEIGHT: 70 IN | WEIGHT: 152 LBS | DIASTOLIC BLOOD PRESSURE: 86 MMHG | OXYGEN SATURATION: 97 % | HEART RATE: 98 BPM | BODY MASS INDEX: 21.76 KG/M2

## 2024-01-20 DIAGNOSIS — S82.831A CLOSED FRACTURE OF RIGHT DISTAL FIBULA: Primary | ICD-10-CM

## 2024-01-20 PROCEDURE — 73610 X-RAY EXAM OF ANKLE: CPT

## 2024-01-20 PROCEDURE — 99284 EMERGENCY DEPT VISIT MOD MDM: CPT | Performed by: EMERGENCY MEDICINE

## 2024-01-20 PROCEDURE — 99283 EMERGENCY DEPT VISIT LOW MDM: CPT

## 2024-01-20 RX ORDER — NAPROXEN 500 MG/1
500 TABLET ORAL 2 TIMES DAILY WITH MEALS
Qty: 30 TABLET | Refills: 0 | Status: SHIPPED | OUTPATIENT
Start: 2024-01-20 | End: 2024-01-24

## 2024-01-20 NOTE — ED PROVIDER NOTES
History  Chief Complaint   Patient presents with    Ankle Injury     Pt reports that she slipped on icy deck two hours ago. Pain and deformity to right ankle noted     Andmarcos Stiles is a 43 y.o. year old female presenting to the [facility] ED for right ankle pain. Patient was walking on deck two hours PTA when slipped on ice, causing her to twist/invert her right ankle. Patient fell to ground though did not strike head. Patient since reporting constant, 3/10 pain in the outside of the right ankle. Associated swelling in the area. Pain is worse with movement or when attempting to bear weight. No weakness/numbness in the RLE. Patient following with orthopedics for chronic right knee pain though no new pain in the right knee since fall. Patient denies any other complaints. The patient has taken motrin at home for symptomatic treatment.      History provided by:  Medical records and patient   used: No        Prior to Admission Medications   Prescriptions Last Dose Informant Patient Reported? Taking?   SF 5000 Plus 1.1 % CREA   Yes No   Sig: BRUSH TEETH ONCE DAILY   fexofenadine (ALLEGRA) 180 MG tablet   Yes No   Sig: Take 180 mg by mouth daily   fluticasone (FLONASE) 50 mcg/act nasal spray   No No   Sig: Use 1 spray(s) in each nostril once daily   loratadine (CLARITIN) 10 mg tablet   Yes No   Sig: Take 10 mg by mouth daily   meloxicam (Mobic) 15 mg tablet   No No   Sig: Take 1 tablet (15 mg total) by mouth daily   methocarbamol (Robaxin-750) 750 mg tablet   No No   Sig: Take 1 tablet (750 mg total) by mouth every 8 (eight) hours as needed for muscle spasms   ondansetron (ZOFRAN-ODT) 4 mg disintegrating tablet   No No   Sig: Take 1 tablet (4 mg total) by mouth every 6 (six) hours as needed for nausea or vomiting      Facility-Administered Medications: None       Past Medical History:   Diagnosis Date    GERD (gastroesophageal reflux disease)     Herpes     Psychiatric disorder     depression,  anxiety    Recurrent urinary tract infection 03/22/2018       Past Surgical History:   Procedure Laterality Date    KNEE SURGERY      TUBAL LIGATION         Family History   Problem Relation Age of Onset    No Known Problems Mother     Heart attack Father     Stroke Father     Drug abuse Sister     No Known Problems Daughter     Colon cancer Maternal Grandmother     Heart attack Maternal Grandfather     Dementia Paternal Grandmother     Lung disease Paternal Grandfather     No Known Problems Paternal Aunt     No Known Problems Paternal Aunt      I have reviewed and agree with the history as documented.    E-Cigarette/Vaping    E-Cigarette Use Never User      E-Cigarette/Vaping Substances    Nicotine No     THC No     CBD No     Flavoring No      Social History     Tobacco Use    Smoking status: Some Days     Current packs/day: 0.50     Types: Cigarettes    Smokeless tobacco: Never   Vaping Use    Vaping status: Never Used   Substance Use Topics    Alcohol use: Yes     Comment: socially    Drug use: No       Review of Systems   Respiratory:  Negative for shortness of breath.    Cardiovascular:  Negative for chest pain.   Musculoskeletal:  Positive for arthralgias and joint swelling.   Skin:  Negative for wound.   Neurological:  Negative for syncope, weakness and numbness.   All other systems reviewed and are negative.      Physical Exam  Physical Exam  Vitals and nursing note reviewed.   Constitutional:       General: She is not in acute distress.     Appearance: She is well-developed. She is not ill-appearing, toxic-appearing or diaphoretic.   HENT:      Head: Normocephalic and atraumatic.      Nose: No congestion or rhinorrhea.   Eyes:      General:         Right eye: No discharge.         Left eye: No discharge.   Cardiovascular:      Rate and Rhythm: Normal rate and regular rhythm.   Pulmonary:      Effort: Pulmonary effort is normal. No accessory muscle usage or respiratory distress.      Breath sounds: Normal  breath sounds. No stridor. No decreased breath sounds, wheezing, rhonchi or rales.   Musculoskeletal:      Cervical back: Normal range of motion. No rigidity.      Right knee: No swelling, effusion, erythema or bony tenderness. Normal range of motion. No tenderness.      Right lower leg: No swelling, tenderness or bony tenderness. No edema.      Left lower leg: No tenderness.      Right ankle: Swelling present. No lacerations. Tenderness present over the lateral malleolus. Normal range of motion.      Right Achilles Tendon: No tenderness or defects. Hutson's test negative.      Right foot: Normal range of motion and normal capillary refill. No swelling, deformity, laceration, tenderness or bony tenderness.   Skin:     Capillary Refill: Capillary refill takes less than 2 seconds.      Findings: No abrasion or laceration.   Neurological:      Mental Status: She is alert and oriented to person, place, and time.   Psychiatric:         Mood and Affect: Mood normal.         Behavior: Behavior normal.         Vital Signs  ED Triage Vitals [01/20/24 0457]   Temperature Pulse Respirations Blood Pressure SpO2   98.8 °F (37.1 °C) 98 16 131/86 97 %      Temp Source Heart Rate Source Patient Position - Orthostatic VS BP Location FiO2 (%)   Temporal Monitor Sitting Left arm --      Pain Score       3           Vitals:    01/20/24 0457   BP: 131/86   Pulse: 98   Patient Position - Orthostatic VS: Sitting         Visual Acuity      ED Medications  Medications - No data to display    Diagnostic Studies  Results Reviewed       None                   XR ankle 3+ views RIGHT   ED Interpretation by Zach De La Rosa DO (01/20 0529)   Mildly displaced right distal fibula fracture.                 Procedures  Procedures         ED Course                               SBIRT 20yo+      Flowsheet Row Most Recent Value   Initial Alcohol Screen: US AUDIT-C     1. How often do you have a drink containing alcohol? 0 Filed at: 01/20/2024 0458    2. How many drinks containing alcohol do you have on a typical day you are drinking?  0 Filed at: 01/20/2024 0458   3a. Male UNDER 65: How often do you have five or more drinks on one occasion? 0 Filed at: 01/20/2024 0458   3b. FEMALE Any Age, or MALE 65+: How often do you have 4 or more drinks on one occassion? 0 Filed at: 01/20/2024 0458   Audit-C Score 0 Filed at: 01/20/2024 0458   MAGUE: How many times in the past year have you...    Used an illegal drug or used a prescription medication for non-medical reasons? Never Filed at: 01/20/2024 0458                      Medical Decision Making    43 y.o. female presenting for right ankle pain after fall.  Point tenderness and swelling in the right lateral malleolus.  Differential includes acute fracture or ligamentous sprain. Will obtain xray.  Patient declines analgesia.    I have reviewed preliminary ED interpretation of x-rays with the patient. The patient understands that their x-rays will be interpreted independently by a radiologist at a later time. The patient is agreeable to discharge at this time and understands that they may be contacted after discharge from the emergency department pending formal xray interpretation by radiology.     Disposition: I have discussed with the patient our plan to discharge them from the ED and the patient is in agreement with this plan.     Discharge Plan: Rx for naproxen, RICE therapy, RLE in [facility], crutches provided. RTED precautions emphasized. The patient was provided a written after visit summary with strict RTED precautions.     Followup: I have discussed with the patient plan to follow up with Orthopedics. Contact information provided in AVS.    Amount and/or Complexity of Data Reviewed  Radiology: ordered and independent interpretation performed.    Risk  Prescription drug management.             Disposition  Final diagnoses:   Closed fracture of right distal fibula     Time reflects when diagnosis was documented in  both MDM as applicable and the Disposition within this note       Time User Action Codes Description Comment    1/20/2024  5:29 AM Zach De La Rosa Add [S82.831A] Closed fracture of right distal fibula           ED Disposition       ED Disposition   Discharge    Condition   Stable    Date/Time   Sat Jan 20, 2024 0529    Comment   Chaka Stiles discharge to home/self care.                   Follow-up Information       Follow up With Specialties Details Why Contact Info    Jason Dawson DO Orthopedic Surgery Schedule an appointment as soon as possible for a visit  To make appointment for reevaluation in 3-5 days. [address] [place] [id number]  [phone]              Discharge Medication List as of 1/20/2024  5:41 AM        START taking these medications    Details   naproxen (Naprosyn) 500 mg tablet Take 1 tablet (500 mg total) by mouth 2 (two) times a day with meals, Starting Sat 1/20/2024, Normal           CONTINUE these medications which have NOT CHANGED    Details   fexofenadine (ALLEGRA) 180 MG tablet Take 180 mg by mouth daily, Historical Med      fluticasone (FLONASE) 50 mcg/act nasal spray Use 1 spray(s) in each nostril once daily, Normal      loratadine (CLARITIN) 10 mg tablet Take 10 mg by mouth daily, Historical Med      meloxicam (Mobic) 15 mg tablet Take 1 tablet (15 mg total) by mouth daily, Starting Thu 2/23/2023, Normal      methocarbamol (Robaxin-750) 750 mg tablet Take 1 tablet (750 mg total) by mouth every 8 (eight) hours as needed for muscle spasms, Starting Thu 9/7/2023, Normal      ondansetron (ZOFRAN-ODT) 4 mg disintegrating tablet Take 1 tablet (4 mg total) by mouth every 6 (six) hours as needed for nausea or vomiting, Starting Wed 12/6/2023, Normal      SF 5000 Plus 1.1 % CREA BRUSH TEETH ONCE DAILY, Historical Med             No discharge procedures on file.    PDMP Review       None            ED Provider  Electronically Signed by             Zach De La Rosa DO  01/20/24  [id number]

## 2024-01-20 NOTE — DISCHARGE INSTRUCTIONS
You have been seen for a right distal fibula fracture. Please take naproxen and tylenol for pain. Please use the provided crutches and walker boot. Return to the emergency department if you develop worsening pain, weakness/numbness or any other symptoms of concern. Please follow up with orthopedics by calling the number provided.

## 2024-01-23 ENCOUNTER — ANESTHESIA EVENT (OUTPATIENT)
Dept: PERIOP | Facility: HOSPITAL | Age: 44
End: 2024-01-23
Payer: COMMERCIAL

## 2024-01-23 ENCOUNTER — PREP FOR PROCEDURE (OUTPATIENT)
Dept: OBGYN CLINIC | Facility: CLINIC | Age: 44
End: 2024-01-23

## 2024-01-23 ENCOUNTER — OFFICE VISIT (OUTPATIENT)
Dept: OBGYN CLINIC | Facility: CLINIC | Age: 44
End: 2024-01-23
Payer: COMMERCIAL

## 2024-01-23 VITALS
WEIGHT: 152 LBS | HEART RATE: 91 BPM | SYSTOLIC BLOOD PRESSURE: 133 MMHG | BODY MASS INDEX: 21.76 KG/M2 | DIASTOLIC BLOOD PRESSURE: 87 MMHG | HEIGHT: 70 IN

## 2024-01-23 DIAGNOSIS — S82.891A CLOSED FRACTURE OF RIGHT ANKLE, INITIAL ENCOUNTER: Primary | ICD-10-CM

## 2024-01-23 PROCEDURE — 99213 OFFICE O/P EST LOW 20 MIN: CPT | Performed by: ORTHOPAEDIC SURGERY

## 2024-01-23 RX ORDER — CEFAZOLIN SODIUM 2 G/50ML
2000 SOLUTION INTRAVENOUS ONCE
Status: CANCELLED | OUTPATIENT
Start: 2024-01-24 | End: 2024-01-23

## 2024-01-23 RX ORDER — CHLORHEXIDINE GLUCONATE ORAL RINSE 1.2 MG/ML
15 SOLUTION DENTAL ONCE
Status: CANCELLED | OUTPATIENT
Start: 2024-01-23 | End: 2024-01-23

## 2024-01-23 RX ORDER — CHLORHEXIDINE GLUCONATE 4 G/100ML
SOLUTION TOPICAL DAILY PRN
Status: CANCELLED | OUTPATIENT
Start: 2024-01-23

## 2024-01-23 NOTE — PROGRESS NOTES
Assessment/Plan:    No problem-specific Assessment & Plan notes found for this encounter.       Diagnoses and all orders for this visit:    Closed fracture of right ankle, initial encounter  -     Case request operating room: OPEN REDUCTION W/ INTERNAL FIXATION (ORIF) ANKLE; Standing  -     Case request operating room: OPEN REDUCTION W/ INTERNAL FIXATION (ORIF) ANKLE    Other orders  -     Nursing Communication Warmimg Interventions Implemented; Standing  -     Nursing Communication CHG bath, have staff wash entire body (neck down) per pre-op bathing protocol. Routine, evening prior to, and day of surgery.; Standing  -     Nursing Communication Swab both nares with Povidone-Iodine solution, EXCLUDE if patient has shellfish/Iodine allergy, and replace with nasal alcohol swabstick. Routine, day of surgery, on call to OR; Standing  -     chlorhexidine (PERIDEX) 0.12 % oral rinse 15 mL  -     Void on call to OR; Standing  -     Insert peripheral IV; Standing  -     chlorhexidine (HIBICLENS) 4 % topical liquid  -     ceFAZolin (ANCEF) IVPB (premix in dextrose) 2,000 mg 50 mL          The patient has a displaced right lateral malleolus ankle fracture.  Treatments were discussed.  She has opted for an open reduction and internal fixation of her right ankle.  All risks, complications, and benefits were discussed with the patient in great detail including bleeding, infection, blood clots, pain, stiffness, neurovascular damage, fractures, dislocations, the possibility loss of life from surgery, nonunion, malunion, loss fixation, wound healing problems, etc.  Her surgery scheduled for January 24, 2024    Subjective:      Patient ID: Chaka Stiles is a 43 y.o. female.    HPI    The patient sustained a slip and fall on her deck on January 19 while wearing socks.  She denies hitting her head or any loss of conscious.  She did have immediate pain along her right ankle and cannot bear weight.  Eventually had x-rays which  "showed a displaced lateral myeloid fracture.  She was given a boot.    The following portions of the patient's history were reviewed and updated as appropriate: allergies, current medications, past family history, past medical history, past social history, past surgical history, and problem list.    Review of Systems   Constitutional:  Negative for chills, fever and unexpected weight change.   HENT:  Negative for hearing loss, nosebleeds and sore throat.    Eyes:  Negative for pain, redness and visual disturbance.   Respiratory:  Negative for cough, shortness of breath and wheezing.    Cardiovascular:  Negative for chest pain, palpitations and leg swelling.   Gastrointestinal:  Negative for abdominal pain, nausea and vomiting.   Endocrine: Negative for polydipsia and polyuria.   Genitourinary:  Negative for dysuria and hematuria.   Musculoskeletal:  Positive for arthralgias, gait problem and myalgias. Negative for back pain, joint swelling, neck pain and neck stiffness.        As noted in HPI   Skin:  Negative for rash and wound.   Neurological:  Negative for dizziness, numbness and headaches.   Psychiatric/Behavioral:  Negative for decreased concentration and suicidal ideas. The patient is not nervous/anxious.          Objective:      /87   Pulse 91   Ht 5' 10\" (1.778 m)   Wt 68.9 kg (152 lb)   LMP  (LMP Unknown)   BMI 21.81 kg/m²          Physical Exam      Right lower extremity is neurovascularly intact.  Toes are pink and mobile.  Compartments are soft.  There is ecchymosis and swelling.  There is tenderness along the lateral malleoli.  No medial mild malleoli pain.  No syndesmotic pain.  No talar dome pain.  Negative Homans.  Pulses intact    X-rays show displaced right lateral mild ankle fracture.  Minimal mortise widening    "

## 2024-01-24 ENCOUNTER — ANESTHESIA (OUTPATIENT)
Dept: PERIOP | Facility: HOSPITAL | Age: 44
End: 2024-01-24
Payer: COMMERCIAL

## 2024-01-24 ENCOUNTER — HOSPITAL ENCOUNTER (OUTPATIENT)
Facility: HOSPITAL | Age: 44
Setting detail: OUTPATIENT SURGERY
Discharge: HOME | End: 2024-01-24
Attending: ORTHOPAEDIC SURGERY | Admitting: ORTHOPAEDIC SURGERY
Payer: COMMERCIAL

## 2024-01-24 ENCOUNTER — APPOINTMENT (OUTPATIENT)
Dept: RADIOLOGY | Facility: HOSPITAL | Age: 44
End: 2024-01-24
Payer: COMMERCIAL

## 2024-01-24 VITALS
RESPIRATION RATE: 16 BRPM | DIASTOLIC BLOOD PRESSURE: 91 MMHG | SYSTOLIC BLOOD PRESSURE: 141 MMHG | HEART RATE: 79 BPM | TEMPERATURE: 98.2 F | BODY MASS INDEX: 23.04 KG/M2 | WEIGHT: 152 LBS | HEIGHT: 68 IN | OXYGEN SATURATION: 100 %

## 2024-01-24 DIAGNOSIS — S82.891A CLOSED FRACTURE OF RIGHT ANKLE, INITIAL ENCOUNTER: Primary | ICD-10-CM

## 2024-01-24 LAB
EXT PREGNANCY TEST URINE: NEGATIVE
EXT. CONTROL: NORMAL

## 2024-01-24 PROCEDURE — C9290 INJ, BUPIVACAINE LIPOSOME: HCPCS | Performed by: STUDENT IN AN ORGANIZED HEALTH CARE EDUCATION/TRAINING PROGRAM

## 2024-01-24 PROCEDURE — 81025 URINE PREGNANCY TEST: CPT | Performed by: ORTHOPAEDIC SURGERY

## 2024-01-24 PROCEDURE — 27792 TREATMENT OF ANKLE FRACTURE: CPT | Performed by: ORTHOPAEDIC SURGERY

## 2024-01-24 PROCEDURE — 27792 TREATMENT OF ANKLE FRACTURE: CPT | Performed by: PHYSICIAN ASSISTANT

## 2024-01-24 PROCEDURE — C1713 ANCHOR/SCREW BN/BN,TIS/BN: HCPCS | Performed by: ORTHOPAEDIC SURGERY

## 2024-01-24 PROCEDURE — 73600 X-RAY EXAM OF ANKLE: CPT

## 2024-01-24 DEVICE — IMPLANTABLE DEVICE: Type: IMPLANTABLE DEVICE | Site: ANKLE | Status: FUNCTIONAL

## 2024-01-24 DEVICE — SCREW CORT 3.5 X 16MM LOPRFL: Type: IMPLANTABLE DEVICE | Site: ANKLE | Status: FUNCTIONAL

## 2024-01-24 RX ORDER — ONDANSETRON 2 MG/ML
4 INJECTION INTRAMUSCULAR; INTRAVENOUS ONCE AS NEEDED
Status: DISCONTINUED | OUTPATIENT
Start: 2024-01-24 | End: 2024-01-24 | Stop reason: HOSPADM

## 2024-01-24 RX ORDER — POVIDONE-IODINE 10 MG/G
OINTMENT TOPICAL AS NEEDED
Status: DISCONTINUED | OUTPATIENT
Start: 2024-01-24 | End: 2024-01-24 | Stop reason: HOSPADM

## 2024-01-24 RX ORDER — FENTANYL CITRATE/PF 50 MCG/ML
50 SYRINGE (ML) INJECTION
Status: DISCONTINUED | OUTPATIENT
Start: 2024-01-24 | End: 2024-01-24 | Stop reason: HOSPADM

## 2024-01-24 RX ORDER — ROPIVACAINE HYDROCHLORIDE 5 MG/ML
INJECTION, SOLUTION EPIDURAL; INFILTRATION; PERINEURAL
Status: COMPLETED | OUTPATIENT
Start: 2024-01-24 | End: 2024-01-24

## 2024-01-24 RX ORDER — PROPOFOL 10 MG/ML
INJECTION, EMULSION INTRAVENOUS AS NEEDED
Status: DISCONTINUED | OUTPATIENT
Start: 2024-01-24 | End: 2024-01-24

## 2024-01-24 RX ORDER — ONDANSETRON 2 MG/ML
INJECTION INTRAMUSCULAR; INTRAVENOUS AS NEEDED
Status: DISCONTINUED | OUTPATIENT
Start: 2024-01-24 | End: 2024-01-24

## 2024-01-24 RX ORDER — ASCORBIC ACID 500 MG
500 TABLET ORAL DAILY
COMMUNITY

## 2024-01-24 RX ORDER — SODIUM CHLORIDE, SODIUM LACTATE, POTASSIUM CHLORIDE, CALCIUM CHLORIDE 600; 310; 30; 20 MG/100ML; MG/100ML; MG/100ML; MG/100ML
100 INJECTION, SOLUTION INTRAVENOUS CONTINUOUS
Status: DISCONTINUED | OUTPATIENT
Start: 2024-01-24 | End: 2024-01-24 | Stop reason: HOSPADM

## 2024-01-24 RX ORDER — CHLORHEXIDINE GLUCONATE ORAL RINSE 1.2 MG/ML
15 SOLUTION DENTAL ONCE
Status: COMPLETED | OUTPATIENT
Start: 2024-01-24 | End: 2024-01-24

## 2024-01-24 RX ORDER — CHLORHEXIDINE GLUCONATE 4 G/100ML
SOLUTION TOPICAL DAILY PRN
Status: DISCONTINUED | OUTPATIENT
Start: 2024-01-24 | End: 2024-01-24 | Stop reason: HOSPADM

## 2024-01-24 RX ORDER — PHENOL 1.4 %
600 AEROSOL, SPRAY (ML) MUCOUS MEMBRANE DAILY
COMMUNITY

## 2024-01-24 RX ORDER — OXYCODONE HYDROCHLORIDE AND ACETAMINOPHEN 5; 325 MG/1; MG/1
1 TABLET ORAL EVERY 4 HOURS PRN
Qty: 28 TABLET | Refills: 0 | Status: SHIPPED | OUTPATIENT
Start: 2024-01-24 | End: 2024-01-24

## 2024-01-24 RX ORDER — HYDROMORPHONE HCL/PF 1 MG/ML
0.5 SYRINGE (ML) INJECTION
Status: DISCONTINUED | OUTPATIENT
Start: 2024-01-24 | End: 2024-01-24 | Stop reason: HOSPADM

## 2024-01-24 RX ORDER — DIPHENOXYLATE HYDROCHLORIDE AND ATROPINE SULFATE 2.5; .025 MG/1; MG/1
1 TABLET ORAL DAILY
COMMUNITY

## 2024-01-24 RX ORDER — ALBUTEROL SULFATE 2.5 MG/3ML
2.5 SOLUTION RESPIRATORY (INHALATION) ONCE AS NEEDED
Status: DISCONTINUED | OUTPATIENT
Start: 2024-01-24 | End: 2024-01-24 | Stop reason: HOSPADM

## 2024-01-24 RX ORDER — HYDROCODONE BITARTRATE AND ACETAMINOPHEN 5; 325 MG/1; MG/1
1 TABLET ORAL EVERY 6 HOURS PRN
Qty: 14 TABLET | Refills: 0 | Status: SHIPPED | OUTPATIENT
Start: 2024-01-24 | End: 2024-02-03

## 2024-01-24 RX ORDER — ASPIRIN 81 MG/1
81 TABLET, CHEWABLE ORAL DAILY
Status: ON HOLD | COMMUNITY
End: 2024-01-24

## 2024-01-24 RX ORDER — PROMETHAZINE HYDROCHLORIDE 25 MG/ML
12.5 INJECTION, SOLUTION INTRAMUSCULAR; INTRAVENOUS ONCE AS NEEDED
Status: DISCONTINUED | OUTPATIENT
Start: 2024-01-24 | End: 2024-01-24 | Stop reason: HOSPADM

## 2024-01-24 RX ORDER — CEFAZOLIN SODIUM 2 G/50ML
2000 SOLUTION INTRAVENOUS ONCE
Status: COMPLETED | OUTPATIENT
Start: 2024-01-24 | End: 2024-01-24

## 2024-01-24 RX ORDER — DEXAMETHASONE SODIUM PHOSPHATE 10 MG/ML
INJECTION, SOLUTION INTRAMUSCULAR; INTRAVENOUS AS NEEDED
Status: DISCONTINUED | OUTPATIENT
Start: 2024-01-24 | End: 2024-01-24

## 2024-01-24 RX ORDER — OXYCODONE HYDROCHLORIDE AND ACETAMINOPHEN 5; 325 MG/1; MG/1
1 TABLET ORAL EVERY 4 HOURS PRN
Status: DISCONTINUED | OUTPATIENT
Start: 2024-01-24 | End: 2024-01-24 | Stop reason: HOSPADM

## 2024-01-24 RX ORDER — MIDAZOLAM HYDROCHLORIDE 2 MG/2ML
INJECTION, SOLUTION INTRAMUSCULAR; INTRAVENOUS AS NEEDED
Status: DISCONTINUED | OUTPATIENT
Start: 2024-01-24 | End: 2024-01-24

## 2024-01-24 RX ORDER — FENTANYL CITRATE 50 UG/ML
INJECTION, SOLUTION INTRAMUSCULAR; INTRAVENOUS AS NEEDED
Status: DISCONTINUED | OUTPATIENT
Start: 2024-01-24 | End: 2024-01-24

## 2024-01-24 RX ORDER — ASPIRIN 81 MG/1
81 TABLET, CHEWABLE ORAL 2 TIMES DAILY
Qty: 28 TABLET | Refills: 0 | Status: SHIPPED | OUTPATIENT
Start: 2024-01-24 | End: 2024-02-07

## 2024-01-24 RX ORDER — LIDOCAINE HYDROCHLORIDE 20 MG/ML
INJECTION, SOLUTION EPIDURAL; INFILTRATION; INTRACAUDAL; PERINEURAL AS NEEDED
Status: DISCONTINUED | OUTPATIENT
Start: 2024-01-24 | End: 2024-01-24

## 2024-01-24 RX ADMIN — ONDANSETRON 4 MG: 2 INJECTION INTRAMUSCULAR; INTRAVENOUS at 14:52

## 2024-01-24 RX ADMIN — SODIUM CHLORIDE, SODIUM LACTATE, POTASSIUM CHLORIDE, AND CALCIUM CHLORIDE: .6; .31; .03; .02 INJECTION, SOLUTION INTRAVENOUS at 15:15

## 2024-01-24 RX ADMIN — ROPIVACAINE HYDROCHLORIDE 10 ML: 5 INJECTION, SOLUTION EPIDURAL; INFILTRATION; PERINEURAL at 13:48

## 2024-01-24 RX ADMIN — FENTANYL CITRATE 100 MCG: 50 INJECTION, SOLUTION INTRAMUSCULAR; INTRAVENOUS at 13:45

## 2024-01-24 RX ADMIN — MIDAZOLAM 2 MG: 1 INJECTION INTRAMUSCULAR; INTRAVENOUS at 13:45

## 2024-01-24 RX ADMIN — LIDOCAINE HYDROCHLORIDE 100 MG: 20 INJECTION, SOLUTION EPIDURAL; INFILTRATION; INTRACAUDAL; PERINEURAL at 13:58

## 2024-01-24 RX ADMIN — CEFAZOLIN SODIUM 2000 MG: 2 SOLUTION INTRAVENOUS at 14:00

## 2024-01-24 RX ADMIN — PROPOFOL 200 MG: 10 INJECTION, EMULSION INTRAVENOUS at 13:58

## 2024-01-24 RX ADMIN — DEXAMETHASONE SODIUM PHOSPHATE 10 MG: 10 INJECTION, SOLUTION INTRAMUSCULAR; INTRAVENOUS at 13:58

## 2024-01-24 RX ADMIN — BUPIVACAINE 10 ML: 13.3 INJECTION, SUSPENSION, LIPOSOMAL INFILTRATION at 13:48

## 2024-01-24 RX ADMIN — CHLORHEXIDINE GLUCONATE 15 ML: 1.2 RINSE ORAL at 13:26

## 2024-01-24 RX ADMIN — SODIUM CHLORIDE, SODIUM LACTATE, POTASSIUM CHLORIDE, AND CALCIUM CHLORIDE 100 ML/HR: .6; .31; .03; .02 INJECTION, SOLUTION INTRAVENOUS at 13:27

## 2024-01-24 NOTE — INTERVAL H&P NOTE
H&P reviewed. After examining the patient I find no changes in the patients condition since the H&P had been written.  Cardiovascular: Normal rate    Pulmonary: Effort normal  Abdomen: Soft, nontender, nondistended   Vitals:    01/24/24 1316   BP: 129/75   Pulse: 90   Resp: 16   Temp: 98.8 °F (37.1 °C)   SpO2: 99%

## 2024-01-24 NOTE — OP NOTE
OPERATIVE REPORT  PATIENT NAME: Chaka Stiles    :  1980  MRN: [id number]  Pt Location: [facility] ROOM 03    SURGERY DATE: 2024    Surgeons and Role:     * Jason Dawson, DO - Primary     * Hola Dhillon PA-C    Preop Diagnosis:  Displaced right lateral mild ankle fracture    Post-Op Diagnosis Codes:  Displaced right lateral malleolar ankle fracture    Procedure(s):  Right - OPEN REDUCTION W/ INTERNAL FIXATION (ORIF) ANKLE-lateral  Using the Arthrex distal fibular/periarticular plate  Application of short leg splint     Specimen(s):  none    Estimated Blood Loss:   Minimal    Drains:  none    Anesthesia Type:   General with popliteal block    Operative Indications:    Displaced right lateral ankle fracture    Operative Findings:  TT: 33    Complications:   None    Procedure and Technique:    The patient was properly identified and brought to the operating suite.  After successful induction of the anesthetic, a tourniquet was placed on patient's right proximal thigh.  The right lower extremity was prepped and draped in the usual sterile fashion .      It was medically necessary that the physician's assistant be in the room to aid in position and applying the appropriate amount of retraction on the patient.  The physician assistant's role was very integral to the success of this case.  He assisted in positioning, draping, retraction soft tissue, assisted with reduction the fracture, placement of hardware, closure of wound, and application of the splint    Surgical landmarks were outlined with skin marker.  An Esmarch was used to exsanguinate the right lower extremity.  The tourniquet was then inflated.    Attention was then paid to fixation of the lateral side.  Using a #15. Scalpel blade, a longitudinal incision was made along the lateral malleoli 1st starting at its tip and extending proximally for about 8-10 cm.  Hemostasis was achieved with the use of electrocautery.  This layer was  brought down to subcutaneous layer.  There was gross disruption of the fracture site.  The hematoma was removed.  The periosteum was slightly elevated both proximally and distally to the fracture site.  A bone clamp was used to reduce the fracture.  It was confirmed to be in anatomic position using multiple fluoroscopic views.  A distal fibula periarticular locking plate was placed on the lateral cortex.  Using a combination of 2.7mm and 3.5mm locking and nonlocking screws, this was used as a buttress.  The bone clamp was then removed.  The fracture was then moving as 1 unit.  Fluoroscopy was brought back into the operative field and there was no incarceration of the hardware into the joint.  An intraoperative “cotton” showed the fracture to be anatomic reduced without any widening of the mortise.  The wound was irrigated.  The lateral wound was closed with 0 Vicryl, 2 O Vicryl and staples.  The wounds was dressed with ointment, Xeroform, 4x4s, sterile Webril, a posterior splint, and an Ace bandage.    There was no complications during the procedure.  She was successfully woken, transferred to [facility], and went to the recovery room in stable condition.       I was present for the entire procedure., A qualified resident physician was not available., and A physician assistant was required during the procedure for retraction, tissue handling, dissection and suturing.    Patient Disposition:  PACU         SIGNATURE: Jason Dawson DO  DATE: January 24, 2024  TIME: 3:02 PM

## 2024-01-24 NOTE — ANESTHESIA POSTPROCEDURE EVALUATION
Post-Op Assessment Note    CV Status:  Stable    Pain management: satisfactory to patient       Mental Status:  Awake and sleepy   Hydration Status:  Stable   PONV Controlled:  Controlled   Airway Patency:  Patent     Post Op Vitals Reviewed: Yes    No anethesia notable event occurred.    Staff: Anesthesiologist, CRNA               BP   124/74   Temp      Pulse  83   Resp   16   SpO2   100

## 2024-01-24 NOTE — ANESTHESIA PREPROCEDURE EVALUATION
Procedure:  OPEN REDUCTION W/ INTERNAL FIXATION (ORIF) ANKLE (Right: Ankle)    Relevant Problems   MUSCULOSKELETAL   (+) Primary osteoarthritis of right knee        Physical Exam    Airway       Dental       Cardiovascular      Pulmonary      Other Findings  post-pubertal.      Anesthesia Plan  ASA Score- 1     Anesthesia Type- general with ASA Monitors.         Additional Monitors:     Airway Plan:     Comment: Popliteal block.       Plan Factors-    Chart reviewed.                      Induction- intravenous.    Postoperative Plan-     Informed Consent- Anesthetic plan and risks discussed with patient.  I personally reviewed this patient with the CRNA. Discussed and agreed on the Anesthesia Plan with the CRNA..

## 2024-01-24 NOTE — ANESTHESIA PROCEDURE NOTES
Peripheral Block    Patient location during procedure: holding area  Start time: 1/24/2024 1:40 PM  Reason for block: at surgeon's request and post-op pain management  Staffing  Performed by: Jef Gates DO  Authorized by: Jef Gates DO    Preanesthetic Checklist  Completed: patient identified, IV checked, site marked, risks and benefits discussed, surgical consent, monitors and equipment checked, pre-op evaluation and timeout performed  Peripheral Block  Patient position: supine  Prep: ChloraPrep  Patient monitoring: frequent blood pressure checks, continuous pulse oximetry and heart rate  Block type: Popliteal  Laterality: right  Injection technique: single-shot  Procedures: ultrasound guided, Ultrasound guidance required for the procedure to increase accuracy and safety of medication placement and decrease risk of complications.  Ultrasound permanent image savedbupivacaine liposomal (EXPAREL) 1.3 % injection 20 mL - Perineural   10 mL - 1/24/2024 1:48:00 PM  ropivacaine (NAROPIN) 0.5 % injection 20 mL - Perineural   10 mL - 1/24/2024 1:48:00 PM  Needle  Needle type: Stimuplex   Needle length: 2 in  Needle localization: anatomical landmarks and ultrasound guidance  Assessment  Injection assessment: incremental injection, frequent aspiration, injected with ease, negative aspiration, negative for heart rate change, no paresthesia on injection, no symptoms of intraneural/intravenous injection and needle tip visualized at all times  Paresthesia pain: none  Post-procedure:  site cleaned  patient tolerated the procedure well with no immediate complications

## 2024-01-24 NOTE — DISCHARGE INSTR - AVS FIRST PAGE
POSTOPERATIVE INFORMATION  ANKLE/FOOT SURGERY    1. Keep your leg elevated as much as possible during the first 48 hours after surgery to minimize swelling.  You may apply an ice pack as needed to control swelling and reduce pain.  2. Wiggle your toes as tolerated, regularly in all directions.  3. Remain nonweightbearing, (no weight on the foot) use crutches or a walker until you are seen in our office for follow-up.  4. Call our office if you experience pain not controlled by your medicine, develop a fever, or experience increased drainage or redness around the incision site.  5. If a postop follow-up appointment is not arranged before you leave the hospital, call our office the day following surgery to schedule this appointment.

## 2024-01-28 PROBLEM — J01.00 SUBACUTE MAXILLARY SINUSITIS: Status: RESOLVED | Noted: 2023-11-29 | Resolved: 2024-01-28

## 2024-02-02 ENCOUNTER — TELEPHONE (OUTPATIENT)
Dept: FAMILY MEDICINE CLINIC | Facility: CLINIC | Age: 44
End: 2024-02-02

## 2024-02-02 NOTE — TELEPHONE ENCOUNTER
Hello, my name is Rosana. I'm calling to see if you are accepting new patients for your dental office. I received the information from my insurance company, so I'm fairly certain that you participate. If you can give me a call back, it's [phone]. Thank you.    LISANDRA for patient to call office if she would like to schedule

## 2024-02-08 ENCOUNTER — APPOINTMENT (OUTPATIENT)
Dept: RADIOLOGY | Facility: MEDICAL CENTER | Age: 44
End: 2024-02-08
Payer: COMMERCIAL

## 2024-02-08 ENCOUNTER — OFFICE VISIT (OUTPATIENT)
Dept: OBGYN CLINIC | Facility: CLINIC | Age: 44
End: 2024-02-08

## 2024-02-08 VITALS
HEIGHT: 68 IN | SYSTOLIC BLOOD PRESSURE: 135 MMHG | HEART RATE: 97 BPM | BODY MASS INDEX: 23.11 KG/M2 | DIASTOLIC BLOOD PRESSURE: 85 MMHG

## 2024-02-08 DIAGNOSIS — Z87.81 S/P ORIF (OPEN REDUCTION INTERNAL FIXATION) FRACTURE: Primary | ICD-10-CM

## 2024-02-08 DIAGNOSIS — Z98.890 S/P ORIF (OPEN REDUCTION INTERNAL FIXATION) FRACTURE: Primary | ICD-10-CM

## 2024-02-08 DIAGNOSIS — Z98.890 S/P ORIF (OPEN REDUCTION INTERNAL FIXATION) FRACTURE: ICD-10-CM

## 2024-02-08 DIAGNOSIS — Z87.81 S/P ORIF (OPEN REDUCTION INTERNAL FIXATION) FRACTURE: ICD-10-CM

## 2024-02-08 PROCEDURE — 99024 POSTOP FOLLOW-UP VISIT: CPT | Performed by: ORTHOPAEDIC SURGERY

## 2024-02-08 PROCEDURE — 73610 X-RAY EXAM OF ANKLE: CPT

## 2024-02-08 NOTE — PROGRESS NOTES
Assessment/Plan:   Diagnoses and all orders for this visit:    S/P ORIF (open reduction internal fixation) fracture  -     Cancel: X-ray ankle left 3+ views; Future  -     XR ankle 3+ vw right; Future         Patient is 2 weeks s/p ORIF of right ankle. Patient is progressing as expected post-operatively. Continue with crutches and the boot. Sutures were removed at time of visit, steri strips were applied. Patient can expose incision for hygiene purposes once the wound heals, but has been advised to avoid submersion. Plan for physical therapy in 1 month. She will be seen in 4 weeks for strength and range of motion check. Patient expresses understanding and is in agreement with treatment plan.     The patient is doing much better in regards to her right ankle fracture.  She was converted to a boot.  Continue same weightbearing status.  Follow-up 1 month evaluation with new x-rays of right ankle-3 views    Subjective:   Patient ID: Chaka Stiles  1980     HPI  Patient is a 43 y.o. female who presents for post-operative evaluation evaluation of her right ankle. The patient is 2 weeks s/p ORIF of her right ankle, completed on 1/24/24. The patient is doing well post-operatively. She states that her pain is well-controlled. She has remained compliant with the non-weightbearing status. The patient was curious on when to begin physical therapy. She denies numbness and tingling.      The following portions of the patient's history were reviewed and updated as appropriate:  Past medical history, past surgical history, Family history, social history, current medications and allergies    Past Medical History:   Diagnosis Date    GERD (gastroesophageal reflux disease)     Herpes     Psychiatric disorder     depression, anxiety    Recurrent urinary tract infection 03/22/2018       Past Surgical History:   Procedure Laterality Date    KNEE SURGERY      ORIF TIBIA & FIBULA FRACTURES Right 1/24/2024    Procedure: OPEN  REDUCTION W/ INTERNAL FIXATION (ORIF) ANKLE;  Surgeon: Jason Dawson DO;  Location: [facility];  Service: Orthopedics    TUBAL LIGATION         Family History   Problem Relation Age of Onset    No Known Problems Mother     Heart attack Father     Stroke Father     Drug abuse Sister     No Known Problems Daughter     Colon cancer Maternal Grandmother     Heart attack Maternal Grandfather     Dementia Paternal Grandmother     Lung disease Paternal Grandfather     No Known Problems Paternal Aunt     No Known Problems Paternal Aunt        Social History     Socioeconomic History    Marital status: Single     Spouse name: None    Number of children: None    Years of education: None    Highest education level: None   Occupational History    None   Tobacco Use    Smoking status: Some Days     Current packs/day: 0.50     Types: Cigarettes    Smokeless tobacco: Never   Vaping Use    Vaping status: Every Day   Substance and Sexual Activity    Alcohol use: Yes     Comment: socially    Drug use: No    Sexual activity: Yes     Partners: Male     Birth control/protection: Female Sterilization   Other Topics Concern    None   Social History Narrative    None     Social Determinants of Health     Financial Resource Strain: Not on file   Food Insecurity: Not on file   Transportation Needs: Not on file   Physical Activity: Not on file   Stress: Not on file   Social Connections: Not on file   Intimate Partner Violence: Not on file   Housing Stability: Not on file         Current Outpatient Medications:     ascorbic acid (VITAMIN C) 500 MG tablet, Take 500 mg by mouth daily, Disp: , Rfl:     calcium carbonate (OS-BEKA) 600 MG tablet, Take 600 mg by mouth daily, Disp: , Rfl:     fexofenadine (ALLEGRA) 180 MG tablet, Take 180 mg by mouth daily, Disp: , Rfl:     fluticasone (FLONASE) 50 mcg/act nasal spray, Use 1 spray(s) in each nostril once daily, Disp: 16 g, Rfl: 0    loratadine (CLARITIN) 10 mg tablet, Take 10 mg by mouth daily,  "Disp: , Rfl:     meloxicam (Mobic) 15 mg tablet, Take 1 tablet (15 mg total) by mouth daily, Disp: 30 tablet, Rfl: 2    methocarbamol (Robaxin-750) 750 mg tablet, Take 1 tablet (750 mg total) by mouth every 8 (eight) hours as needed for muscle spasms, Disp: 30 tablet, Rfl: 0    multivitamin (THERAGRAN) TABS, Take 1 tablet by mouth daily, Disp: , Rfl:     ondansetron (ZOFRAN-ODT) 4 mg disintegrating tablet, Take 1 tablet (4 mg total) by mouth every 6 (six) hours as needed for nausea or vomiting, Disp: 15 tablet, Rfl: 0    SF 5000 Plus 1.1 % CREA, BRUSH TEETH ONCE DAILY, Disp: , Rfl:     aspirin 81 mg chewable tablet, Chew 1 tablet (81 mg total) 2 (two) times a day for 14 days, Disp: 28 tablet, Rfl: 0    No Known Allergies    Review of Systems   Constitutional:  Negative for chills, fever and unexpected weight change.   HENT:  Negative for hearing loss, nosebleeds and sore throat.    Eyes:  Negative for pain, redness and visual disturbance.   Respiratory:  Negative for cough, shortness of breath and wheezing.    Cardiovascular:  Negative for chest pain, palpitations and leg swelling.   Gastrointestinal:  Negative for abdominal pain, nausea and vomiting.   Endocrine: Negative for polydipsia and polyuria.   Genitourinary:  Negative for dysuria and hematuria.   Skin:  Negative for rash and wound.   Neurological:  Negative for dizziness, numbness and headaches.   Psychiatric/Behavioral:  Negative for decreased concentration and suicidal ideas. The patient is not nervous/anxious.    All other systems reviewed and are negative.       Objective:  /85 (BP Location: Left arm, Patient Position: Sitting, Cuff Size: Standard)   Pulse 97   Ht 5' 8\" (1.727 m)   LMP 01/22/2024   BMI 23.11 kg/m²     Ortho Exam  right ankle -   Patient is non-weightbearing  Uses Crutches and boot as assistive device  Incision is well-healed no signs of erythema, dehiscense, infection  Mild generalized soft tissue swelling or effusion " noted  Calf compartments are soft and supple  2+ TP and DP pulses with brisk capillary refill to the toes  Sural, saphenous, tibial, superficial, and deep peroneal motor and sensory distributions intact  Sensation to light touch intact distally      Physical Exam  HENT:      Head: Normocephalic and atraumatic.      Nose: Nose normal.   Eyes:      Conjunctiva/sclera: Conjunctivae normal.   Cardiovascular:      Rate and Rhythm: Normal rate.   Pulmonary:      Effort: Pulmonary effort is normal.   Musculoskeletal:      Cervical back: Neck supple.   Skin:     General: Skin is warm and dry.      Capillary Refill: Capillary refill takes less than 2 seconds.   Neurological:      Mental Status: She is alert and oriented to person, place, and time.   Psychiatric:         Mood and Affect: Mood normal.         Behavior: Behavior normal.          Diagnostic Test Review:  The attending physician has personally reviewed the pertinent films in PACS and the interpretation is as follows:    X-Ray of right ankle taken on 2/8/2024 were reviewed and showed surgical hardware intact, maintained anatomical alignment. No evidence of hardware fracture or loosening.      Procedures   None performed.       Scribe Attestation      I,:  Shavon Wilson am acting as a scribe while in the presence of the attending physician.:       I,:  Jason Dawson, DO personally performed the services described in this documentation    as scribed in my presence.:

## 2024-03-07 ENCOUNTER — APPOINTMENT (OUTPATIENT)
Dept: RADIOLOGY | Facility: MEDICAL CENTER | Age: 44
End: 2024-03-07
Payer: COMMERCIAL

## 2024-03-07 ENCOUNTER — OFFICE VISIT (OUTPATIENT)
Dept: OBGYN CLINIC | Facility: CLINIC | Age: 44
End: 2024-03-07

## 2024-03-07 VITALS — HEIGHT: 68 IN | WEIGHT: 152 LBS | BODY MASS INDEX: 23.04 KG/M2

## 2024-03-07 DIAGNOSIS — Z87.81 S/P ORIF (OPEN REDUCTION INTERNAL FIXATION) FRACTURE: ICD-10-CM

## 2024-03-07 DIAGNOSIS — Z87.81 S/P ORIF (OPEN REDUCTION INTERNAL FIXATION) FRACTURE: Primary | ICD-10-CM

## 2024-03-07 DIAGNOSIS — Z98.890 S/P ORIF (OPEN REDUCTION INTERNAL FIXATION) FRACTURE: Primary | ICD-10-CM

## 2024-03-07 DIAGNOSIS — Z98.890 S/P ORIF (OPEN REDUCTION INTERNAL FIXATION) FRACTURE: ICD-10-CM

## 2024-03-07 PROCEDURE — 99024 POSTOP FOLLOW-UP VISIT: CPT | Performed by: ORTHOPAEDIC SURGERY

## 2024-03-07 PROCEDURE — 73610 X-RAY EXAM OF ANKLE: CPT

## 2024-03-07 NOTE — PROGRESS NOTES
Assessment/Plan:   Diagnoses and all orders for this visit:    S/P ORIF (open reduction internal fixation) fracture  -     XR ankle 3+ vw right; Future  -     Ankle Cude ankle/Ankle Brace    Reviewed today's physical exam findings and x-ray findings with patient at time of visit. She can discontinue use of crutches, and perform weightbearing activity in her cam boot. Reviewed with patient that although she does feel improved, and her x-rays demonstrate good healing, she has not all the way healed yet and needs to continue to be cautious about her degree of activity. She is being provided a lace up ankle brace to be worn for driving, but should continue all weightbearing activity in her boot. Discussed with patient that if she is going to continue with meloxicam, she needs to be taking it routinely once a day. And, if she is taking meloxicam, she is not to also take any NSAID medications along with it. If she feels that her pain is increasing, she can take Tylenol OTC. As she demonstrates good range of motion and improving strength on exam today, she can continue to hold on her original PT referral and continue with her home exercise program as it seems to be helping her along quite nicely. Seen for follow-up in 6 weeks at which time we will repeat x-rays, 3 views right ankle to assess healing. If any questions or concerns arise she can contact the office. Patient is present understanding and is in agreement with this treatment plan.    The patient is doing quite well in regards to her right ankle fracture.  The fracture is healing.  She may weight-bear as tolerated with the boot.  She was instructed on a home exercise program.  Follow-up 6 weeks reevaluation with new x-rays of right ankle-3 views    Subjective:   Patient ID: Chaka Campoverderosmery  1980     HPI  Patient is a 43 y.o. female who presents for 6-week follow-up evaluation P/O ORIF right distal fibular fracture 1/24/2024. Patient states that she is  progressing well postoperatively. She discontinued use of crutches as of 2 days ago. She is presently performing weightbearing activity in her cam boot, which she says is tolerable. She was previously using meloxicam for pain in her right knee, and admits that she has continued using it as needed. On the days that she does not take meloxicam, she takes over-the-counter Advil or Tylenol for pain relief. On today's presentation she reports continued swelling and soreness in the lateral aspect of the right ankle. She denies any new onset bruising, numbness, tingling, or mechanical symptoms. She has not initiated physical therapy up to this point, but has been doing home exercises consisting of open chain range of motion exercises and light stretching.    The following portions of the patient's history were reviewed and updated as appropriate:  Past medical history, past surgical history, Family history, social history, current medications and allergies    Past Medical History:   Diagnosis Date    GERD (gastroesophageal reflux disease)     Herpes     Psychiatric disorder     depression, anxiety    Recurrent urinary tract infection 03/22/2018       Past Surgical History:   Procedure Laterality Date    KNEE SURGERY      ORIF TIBIA & FIBULA FRACTURES Right 1/24/2024    Procedure: OPEN REDUCTION W/ INTERNAL FIXATION (ORIF) ANKLE;  Surgeon: Jason Dawson DO;  Location: [facility];  Service: Orthopedics    TUBAL LIGATION         Family History   Problem Relation Age of Onset    No Known Problems Mother     Heart attack Father     Stroke Father     Drug abuse Sister     No Known Problems Daughter     Colon cancer Maternal Grandmother     Heart attack Maternal Grandfather     Dementia Paternal Grandmother     Lung disease Paternal Grandfather     No Known Problems Paternal Aunt     No Known Problems Paternal Aunt        Social History     Socioeconomic History    Marital status: Single     Spouse name: None    Number of  children: None    Years of education: None    Highest education level: None   Occupational History    None   Tobacco Use    Smoking status: Some Days     Current packs/day: 0.50     Types: Cigarettes    Smokeless tobacco: Never   Vaping Use    Vaping status: Every Day   Substance and Sexual Activity    Alcohol use: Yes     Comment: socially    Drug use: No    Sexual activity: Yes     Partners: Male     Birth control/protection: Female Sterilization   Other Topics Concern    None   Social History Narrative    None     Social Determinants of Health     Financial Resource Strain: Not on file   Food Insecurity: Not on file   Transportation Needs: Not on file   Physical Activity: Not on file   Stress: Not on file   Social Connections: Not on file   Intimate Partner Violence: Not on file   Housing Stability: Not on file         Current Outpatient Medications:     ascorbic acid (VITAMIN C) 500 MG tablet, Take 500 mg by mouth daily, Disp: , Rfl:     calcium carbonate (OS-BEKA) 600 MG tablet, Take 600 mg by mouth daily, Disp: , Rfl:     fexofenadine (ALLEGRA) 180 MG tablet, Take 180 mg by mouth daily, Disp: , Rfl:     fluticasone (FLONASE) 50 mcg/act nasal spray, Use 1 spray(s) in each nostril once daily, Disp: 16 g, Rfl: 0    loratadine (CLARITIN) 10 mg tablet, Take 10 mg by mouth daily, Disp: , Rfl:     meloxicam (Mobic) 15 mg tablet, Take 1 tablet (15 mg total) by mouth daily, Disp: 30 tablet, Rfl: 2    methocarbamol (Robaxin-750) 750 mg tablet, Take 1 tablet (750 mg total) by mouth every 8 (eight) hours as needed for muscle spasms, Disp: 30 tablet, Rfl: 0    multivitamin (THERAGRAN) TABS, Take 1 tablet by mouth daily, Disp: , Rfl:     ondansetron (ZOFRAN-ODT) 4 mg disintegrating tablet, Take 1 tablet (4 mg total) by mouth every 6 (six) hours as needed for nausea or vomiting, Disp: 15 tablet, Rfl: 0    SF 5000 Plus 1.1 % CREA, BRUSH TEETH ONCE DAILY, Disp: , Rfl:     aspirin 81 mg chewable tablet, Chew 1 tablet (81 mg  "total) 2 (two) times a day for 14 days, Disp: 28 tablet, Rfl: 0    No Known Allergies    Review of Systems   Constitutional:  Negative for chills, fatigue and fever.   Gastrointestinal:  Negative for nausea.   Musculoskeletal:         As noted in HPI   Neurological:  Negative for dizziness, numbness and headaches.   All other systems reviewed and are negative.       Objective:  Ht 5' 8\" (1.727 m)   Wt 68.9 kg (152 lb)   BMI 23.11 kg/m²     Ortho Exam  Right ankle -   Weight-bearing status: Full WBAT in CAM boot  Incision(s): Clean, dry, healing -no signs of drainage or dehiscence  Skin is warm and dry with no signs of erythema, ecchymosis, or infection  Mild generalized soft tissue swelling, no effusion  ROM: PF 0-30, DF 0-15, Inv/Ev 0-10  Strength: 4/5 throughout  Nontender to palpation over surgery sight  Calf compartments are soft  - Leigh Ann's sign  2+ TP and DP pulses with brisk capillary refill to the toes  Sural, saphenous, tibial, superficial and deep peroneal motor and sensory distributions intact  Sensation light touch intact distally    Physical Exam  Vitals and nursing note reviewed.   Constitutional:       General: She is not in acute distress.     Appearance: She is well-developed.   HENT:      Head: Normocephalic and atraumatic.   Eyes:      Conjunctiva/sclera: Conjunctivae normal.   Cardiovascular:      Rate and Rhythm: Normal rate.   Pulmonary:      Effort: Pulmonary effort is normal.   Musculoskeletal:      Cervical back: Neck supple.   Skin:     General: Skin is warm and dry.      Capillary Refill: Capillary refill takes less than 2 seconds.   Neurological:      Mental Status: She is alert and oriented to person, place, and time.   Psychiatric:         Mood and Affect: Mood normal.         Behavior: Behavior normal.          Diagnostic Test Review:  Attending Physician has personally reviewed pertinent imaging in PACS, impression is as follows:    Review of radiographic series taken 3/7/2024 of the " right ankle shows successful reduction of distal fibular fracture with surgical hardware to be in maintained alignment as compared to previous imaging. Evident callus formation and trabecular bridging is consistent with routine healing    Procedures   No procedures performed this visit    Scribe Attestation      I,:  Tay Yancey am acting as a scribe while in the presence of the attending physician.:       I,:  Jason Dawson, DO personally performed the services described in this documentation    as scribed in my presence.:

## 2024-03-08 ENCOUNTER — TELEPHONE (OUTPATIENT)
Dept: FAMILY MEDICINE CLINIC | Facility: CLINIC | Age: 44
End: 2024-03-08

## 2024-03-08 DIAGNOSIS — Z00.00 ENCOUNTER FOR PREVENTIVE CARE: Primary | ICD-10-CM

## 2024-03-08 DIAGNOSIS — E55.9 VITAMIN D DEFICIENCY: ICD-10-CM

## 2024-03-08 NOTE — TELEPHONE ENCOUNTER
Patient heading over to the lab this morning and asking for routine labs to be ordered. She thought there was labs in there but I am not seeing anything.

## 2024-03-21 ENCOUNTER — HOSPITAL ENCOUNTER (OUTPATIENT)
Dept: MAMMOGRAPHY | Facility: HOSPITAL | Age: 44
Discharge: HOME | End: 2024-03-21
Attending: OBSTETRICS & GYNECOLOGY
Payer: COMMERCIAL

## 2024-03-21 ENCOUNTER — HOSPITAL ENCOUNTER (OUTPATIENT)
Dept: ULTRASOUND IMAGING | Facility: HOSPITAL | Age: 44
Discharge: HOME | End: 2024-03-21
Attending: OBSTETRICS & GYNECOLOGY
Payer: COMMERCIAL

## 2024-03-21 VITALS — HEIGHT: 68 IN | BODY MASS INDEX: 23.04 KG/M2 | WEIGHT: 152 LBS

## 2024-03-21 DIAGNOSIS — R92.8 ABNORMAL MAMMOGRAM: ICD-10-CM

## 2024-03-21 DIAGNOSIS — N64.89 GALACTOCELE: ICD-10-CM

## 2024-03-21 PROCEDURE — 77066 DX MAMMO INCL CAD BI: CPT

## 2024-03-21 PROCEDURE — 76642 ULTRASOUND BREAST LIMITED: CPT

## 2024-03-21 PROCEDURE — G0279 TOMOSYNTHESIS, MAMMO: HCPCS

## 2024-03-27 ENCOUNTER — OFFICE VISIT (OUTPATIENT)
Dept: URGENT CARE | Facility: MEDICAL CENTER | Age: 44
End: 2024-03-27
Payer: COMMERCIAL

## 2024-03-27 VITALS
RESPIRATION RATE: 18 BRPM | WEIGHT: 145 LBS | HEIGHT: 68 IN | DIASTOLIC BLOOD PRESSURE: 90 MMHG | TEMPERATURE: 97.7 F | HEART RATE: 105 BPM | SYSTOLIC BLOOD PRESSURE: 121 MMHG | BODY MASS INDEX: 21.98 KG/M2 | OXYGEN SATURATION: 97 %

## 2024-03-27 DIAGNOSIS — B34.9 VIRAL ILLNESS: Primary | ICD-10-CM

## 2024-03-27 PROCEDURE — S9088 SERVICES PROVIDED IN URGENT: HCPCS | Performed by: PHYSICIAN ASSISTANT

## 2024-03-27 PROCEDURE — 99212 OFFICE O/P EST SF 10 MIN: CPT | Performed by: PHYSICIAN ASSISTANT

## 2024-03-27 NOTE — PROGRESS NOTES
[facility]        NAME: Chaka Stiles is a 44 y.o. female  : 1980    MRN: [id number]  DATE: 2024  TIME: 11:31 AM    Assessment and Plan   Viral illness [B34.9]  1. Viral illness              Patient Instructions     Tylenol or Ibuprofen as needed for fever or pain  Drink plenty of fluids  Over the Counter cold medication to control symptoms  If symptoms fail to improve follow up with PCP  If symptoms worsen have yourself rechecked    Follow up with PCP in 3-5 days.  Proceed to  ER if symptoms worsen.    If tests have been performed at [facility], our office will contact you with results if changes need to be made to the care plan discussed with you at the visit.  You can review your full results on [facility].    Chief Complaint     Chief Complaint   Patient presents with   • Cold Like Symptoms     Cough and sinus congestion x 2 weeks , fever started yesterday max temp 103.8         History of Present Illness       Patient presents with a 2-week history of cough and sinus congestion.  Last evening she developed fever as high as 103.8 and nausea.  Patient did not have vomiting or diarrhea.  Today symptoms have improved.  Her son has similar symptoms.  Patient has Zofran at home from her previous visit but did not take the medication as she thought she had to be vomiting to take it.        Review of Systems   Review of Systems   Constitutional:  Positive for fever.   HENT:  Positive for congestion. Negative for sore throat.    Respiratory:  Positive for cough.    Gastrointestinal:  Positive for nausea. Negative for abdominal pain, diarrhea and vomiting.         Current Medications       Current Outpatient Medications:   •  ascorbic acid (VITAMIN C) 500 MG tablet, Take 500 mg by mouth daily, Disp: , Rfl:   •  calcium carbonate (OS-BEKA) 600 MG tablet, Take 600 mg by mouth daily, Disp: , Rfl:   •  fexofenadine (ALLEGRA) 180 MG tablet, Take 180 mg by mouth daily, Disp: , Rfl:   •   fluticasone (FLONASE) 50 mcg/act nasal spray, Use 1 spray(s) in each nostril once daily, Disp: 16 g, Rfl: 0  •  loratadine (CLARITIN) 10 mg tablet, Take 10 mg by mouth daily, Disp: , Rfl:   •  meloxicam (Mobic) 15 mg tablet, Take 1 tablet (15 mg total) by mouth daily, Disp: 30 tablet, Rfl: 2  •  multivitamin (THERAGRAN) TABS, Take 1 tablet by mouth daily, Disp: , Rfl:   •  SF 5000 Plus 1.1 % CREA, BRUSH TEETH ONCE DAILY, Disp: , Rfl:   •  aspirin 81 mg chewable tablet, Chew 1 tablet (81 mg total) 2 (two) times a day for 14 days, Disp: 28 tablet, Rfl: 0  •  methocarbamol (Robaxin-750) 750 mg tablet, Take 1 tablet (750 mg total) by mouth every 8 (eight) hours as needed for muscle spasms (Patient not taking: Reported on 3/27/2024), Disp: 30 tablet, Rfl: 0  •  ondansetron (ZOFRAN-ODT) 4 mg disintegrating tablet, Take 1 tablet (4 mg total) by mouth every 6 (six) hours as needed for nausea or vomiting (Patient not taking: Reported on 3/27/2024), Disp: 15 tablet, Rfl: 0    Current Allergies     Allergies as of 03/27/2024   • (No Known Allergies)            The following portions of the patient's history were reviewed and updated as appropriate: allergies, current medications, past family history, past medical history, past social history, past surgical history and problem list.     Past Medical History:   Diagnosis Date   • GERD (gastroesophageal reflux disease)    • Herpes    • Psychiatric disorder     depression, anxiety   • Recurrent urinary tract infection 03/22/2018       Past Surgical History:   Procedure Laterality Date   • KNEE SURGERY     • ORIF TIBIA & FIBULA FRACTURES Right 1/24/2024    Procedure: OPEN REDUCTION W/ INTERNAL FIXATION (ORIF) ANKLE;  Surgeon: Jason Dawson DO;  Location: [facility];  Service: Orthopedics   • TUBAL LIGATION         Family History   Problem Relation Age of Onset   • No Known Problems Mother    • Heart attack Father    • Stroke Father    • Drug abuse Sister    • No Known Problems  "Daughter    • Colon cancer Maternal Grandmother    • Heart attack Maternal Grandfather    • Dementia Paternal Grandmother    • Lung disease Paternal Grandfather    • No Known Problems Paternal Aunt    • No Known Problems Paternal Aunt    • BRCA2 Positive Neg Hx    • BRCA1 Negative Neg Hx    • Ovarian cancer Neg Hx    • Endometrial cancer Neg Hx    • BRCA 1/2 Neg Hx          Medications have been verified.        Objective   /90   Pulse 105   Temp 97.7 °F (36.5 °C)   Resp 18   Ht 5' 8\" (1.727 m)   Wt 65.8 kg (145 lb)   LMP 03/13/2024 (Exact Date)   SpO2 97%   BMI 22.05 kg/m²   Patient's last menstrual period was 03/13/2024 (exact date).       Physical Exam     Physical Exam  Vitals and nursing note reviewed.   Constitutional:       Appearance: Normal appearance.   HENT:      Head: Normocephalic and atraumatic.      Right Ear: Tympanic membrane normal.      Left Ear: Tympanic membrane normal.      Mouth/Throat:      Mouth: Mucous membranes are moist.      Pharynx: Oropharynx is clear.   Eyes:      Conjunctiva/sclera: Conjunctivae normal.   Cardiovascular:      Rate and Rhythm: Normal rate and regular rhythm.      Heart sounds: Normal heart sounds.   Pulmonary:      Effort: Pulmonary effort is normal.      Breath sounds: Normal breath sounds.   Abdominal:      General: Abdomen is flat.      Tenderness: There is no abdominal tenderness.   Musculoskeletal:      Cervical back: Neck supple.   Lymphadenopathy:      Cervical: No cervical adenopathy.   Skin:     General: Skin is warm.   Neurological:      Mental Status: She is alert.                   "

## 2024-04-07 NOTE — PROGRESS NOTES
"Sheng Stiles is a 44 y.o. female who presents for annual well woman exam.  Last Pap smear 23 NILM/ HR HPV(-).  Last mammogram 3/21/24 birads 3. Due for follow up diagnostic bilateral mammogram and US in 6 months. Periods are irregular every 4-6 weeks, lasting  4-7  days. No intermenstrual bleeding, spotting, or discharge.    Current contraception: tubal ligation  History of abnormal Pap smear: no  Family history of uterine or ovarian cancer: no  Regular self breast exam: no  History of abnormal mammogram: yes   Family history of breast cancer: no  History of abnormal lipids: no    Menstrual History:  OB History          3    Para   2    Term   2            AB   1    Living   2         SAB        IAB        Ectopic        Multiple        Live Births   2           Obstetric Comments   Menarche age 16              Menarche age: 16  Patient's last menstrual period was 2024 (exact date).  Period Cycle (Days):  (4-6 weeks)  Period Duration (Days): 4-7  Period Pattern: Regular  Menstrual Flow: Light, Moderate, Heavy (heavy for 3 days ( every other month))  Menstrual Control: Maxi pad, Tampon  Menstrual Control Change Freq (Hours): every 2-3 hours heavy days  Dysmenorrhea: None  Dysmenorrhea Symptoms: Cramping (rare - 2 times a year)    The following portions of the patient's history were reviewed and updated as appropriate: allergies, current medications, past family history, past medical history, past social history, past surgical history, and problem list.    Review of Systems  Pertinent items are noted in HPI.      Objective      /94   Ht 5' 8\" (1.727 m)   Wt 70.3 kg (155 lb)   LMP 2024 (Exact Date)   BMI 23.57 kg/m²     General:   appears stated age and cooperative   Heart: regular rate and rhythm, S1, S2 normal, no murmur, click, rub or gallop   Lungs: clear to auscultation bilaterally   Abdomen: soft, non-tender, without masses or organomegaly   Vulva: " normal, Bartholin's, Urethra, [place]'s normal   Vagina: normal mucosa, normal discharge, no palpable nodules   Cervix: no cervical motion tenderness and no lesions   Uterus: normal size, non-tender, normal shape and consistency   Adnexa: normal adnexa and no mass, fullness, tenderness   Breast: breasts appear normal, no suspicious masses, no skin or nipple changes or axillary nodes.              Assessment      @well woman@ .     Plan      All questions answered.  Breast self exam technique reviewed and patient encouraged to perform self-exam monthly.  Contraception: tubal ligation.  Diagnosis explained in detail, including differential.  Dietary diary.  Discussed healthy lifestyle modifications.  Educational material distributed.  Follow up in 1 year.  Follow up as needed.  Breast awareness reviewed    F/u diagnostic mammogram & US scheduled for 3/2025, should be 9/2024. Message sent to nurse navigator at the breast center to correct scheduling. Patient to call office if she does not hear from in the next week.   Encouraged healthy diet, exercise and lifestyle  Encouraged follow-up with PCP as needed  Gardasil vaccine series reviewed.  Written information provided.  Will call / [employer] message with results

## 2024-04-09 ENCOUNTER — ANNUAL EXAM (OUTPATIENT)
Dept: OBGYN CLINIC | Facility: CLINIC | Age: 44
End: 2024-04-09
Payer: COMMERCIAL

## 2024-04-09 VITALS
WEIGHT: 155 LBS | SYSTOLIC BLOOD PRESSURE: 134 MMHG | DIASTOLIC BLOOD PRESSURE: 94 MMHG | BODY MASS INDEX: 23.49 KG/M2 | HEIGHT: 68 IN

## 2024-04-09 DIAGNOSIS — Z01.419 WELL WOMAN EXAM WITH ROUTINE GYNECOLOGICAL EXAM: Primary | ICD-10-CM

## 2024-04-09 PROBLEM — Z87.81 S/P ORIF (OPEN REDUCTION INTERNAL FIXATION) FRACTURE: Status: RESOLVED | Noted: 2024-03-07 | Resolved: 2024-04-09

## 2024-04-09 PROBLEM — Z98.890 S/P ORIF (OPEN REDUCTION INTERNAL FIXATION) FRACTURE: Status: RESOLVED | Noted: 2024-03-07 | Resolved: 2024-04-09

## 2024-04-09 PROBLEM — Z00.00 ANNUAL PHYSICAL EXAM: Status: RESOLVED | Noted: 2023-02-23 | Resolved: 2024-04-09

## 2024-04-09 PROCEDURE — 99396 PREV VISIT EST AGE 40-64: CPT | Performed by: NURSE PRACTITIONER

## 2024-04-11 ENCOUNTER — OFFICE VISIT (OUTPATIENT)
Dept: OBGYN CLINIC | Facility: CLINIC | Age: 44
End: 2024-04-11

## 2024-04-11 ENCOUNTER — APPOINTMENT (OUTPATIENT)
Dept: RADIOLOGY | Facility: MEDICAL CENTER | Age: 44
End: 2024-04-11
Payer: COMMERCIAL

## 2024-04-11 VITALS
SYSTOLIC BLOOD PRESSURE: 128 MMHG | HEART RATE: 86 BPM | BODY MASS INDEX: 23.49 KG/M2 | WEIGHT: 155 LBS | DIASTOLIC BLOOD PRESSURE: 79 MMHG | HEIGHT: 68 IN

## 2024-04-11 DIAGNOSIS — Z87.81 S/P ORIF (OPEN REDUCTION INTERNAL FIXATION) FRACTURE: ICD-10-CM

## 2024-04-11 DIAGNOSIS — Z98.890 S/P ORIF (OPEN REDUCTION INTERNAL FIXATION) FRACTURE: ICD-10-CM

## 2024-04-11 DIAGNOSIS — Z87.81 S/P ORIF (OPEN REDUCTION INTERNAL FIXATION) FRACTURE: Primary | ICD-10-CM

## 2024-04-11 DIAGNOSIS — Z98.890 S/P ORIF (OPEN REDUCTION INTERNAL FIXATION) FRACTURE: Primary | ICD-10-CM

## 2024-04-11 PROCEDURE — 73610 X-RAY EXAM OF ANKLE: CPT

## 2024-04-11 PROCEDURE — 99024 POSTOP FOLLOW-UP VISIT: CPT | Performed by: ORTHOPAEDIC SURGERY

## 2024-04-11 NOTE — PROGRESS NOTES
Assessment/Plan:   Diagnoses and all orders for this visit:    S/P ORIF (open reduction internal fixation) fracture  -     XR ankle 3+ vw right; Future  -     Ambulatory Referral to Physical Therapy; Future         Reviewed physical exam and imaging with patient at time of visit. The patient is 3 months s/p ORIF of the Right ankle. The patient continues to progress well post-operatively. A prescription was placed for physical therapy, focus on strength and motion. She may discontinue use of the boot. Continue weightbearing activity as tolerated. She will follow-up in 3 months with finals x-rays of the right ankle, 3 view. The patient expresses understanding and is in agreement with today's treatment plan.     Clinically and radiographically, the fracture is healed.  The patient desires to start physical therapy.  The order was placed.  There is full strength full motion along her ankle.  Return back in 3 months for final strength, motion check and x-rays of right ankle-3 views        Subjective:   Patient ID: Chaka Stiles  1980     HPI  Patient is a 44 y.o. female who presents for post-operative evaluation of her right ankle. The patient is approximately 3 months s/p ORIF of her right ankle, completed on 1/24/2024. The patient states that she has used the boot very sparingly since her last appointment. Overall, she states that her ankle is doing well. She reports discomfort with driving activities. She states that she will occasionally experience swelling at the end of a day. She denies numbness or tingling. The patient is interested in beginning physical therapy.    The following portions of the patient's history were reviewed and updated as appropriate:  Past medical history, past surgical history, Family history, social history, current medications and allergies    Past Medical History:   Diagnosis Date    Abnormal Pap smear of cervix     GERD (gastroesophageal reflux disease)     Herpes      Psychiatric disorder     depression, anxiety    Recurrent urinary tract infection 03/22/2018       Past Surgical History:   Procedure Laterality Date    KNEE SURGERY      ORIF TIBIA & FIBULA FRACTURES Right 1/24/2024    Procedure: OPEN REDUCTION W/ INTERNAL FIXATION (ORIF) ANKLE;  Surgeon: Jason Dawson DO;  Location: [facility];  Service: Orthopedics    TUBAL LIGATION         Family History   Problem Relation Age of Onset    No Known Problems Mother     Cancer Father         type unknown    Heart disease Father     Heart attack Father     Stroke Father     Other Father         transplant kidney  or  liver    Drug abuse Sister     No Known Problems Daughter     Heart murmur Son     Colon cancer Maternal Grandmother     Heart disease Maternal Grandfather     Heart attack Maternal Grandfather     Dementia Paternal Grandmother     Lung disease Paternal Grandfather     No Known Problems Paternal Aunt     No Known Problems Paternal Aunt     BRCA2 Positive Neg Hx     BRCA1 Negative Neg Hx     Ovarian cancer Neg Hx     Endometrial cancer Neg Hx     BRCA 1/2 Neg Hx     Breast cancer Neg Hx        Social History     Socioeconomic History    Marital status: Single     Spouse name: None    Number of children: None    Years of education: None    Highest education level: None   Occupational History    None   Tobacco Use    Smoking status: Some Days     Current packs/day: 0.50     Types: Cigarettes    Smokeless tobacco: Never   Vaping Use    Vaping status: Never Used   Substance and Sexual Activity    Alcohol use: Yes     Comment: socially    Drug use: Yes     Comment: medical marijuana    Sexual activity: Yes     Partners: Male     Birth control/protection: Female Sterilization   Other Topics Concern    None   Social History Narrative    None     Social Determinants of Health     Financial Resource Strain: Not on file   Food Insecurity: Not on file   Transportation Needs: Not on file   Physical Activity: Not on file    Stress: Not on file   Social Connections: Not on file   Intimate Partner Violence: Not on file   Housing Stability: Not on file         Current Outpatient Medications:     calcium carbonate (OS-BEKA) 600 MG tablet, Take 600 mg by mouth daily, Disp: , Rfl:     fexofenadine (ALLEGRA) 180 MG tablet, Take 180 mg by mouth daily, Disp: , Rfl:     fluticasone (FLONASE) 50 mcg/act nasal spray, Use 1 spray(s) in each nostril once daily, Disp: 16 g, Rfl: 0    loratadine (CLARITIN) 10 mg tablet, Take 10 mg by mouth daily, Disp: , Rfl:     meloxicam (Mobic) 15 mg tablet, Take 1 tablet (15 mg total) by mouth daily, Disp: 30 tablet, Rfl: 2    multivitamin (THERAGRAN) TABS, Take 1 tablet by mouth daily, Disp: , Rfl:     SF 5000 Plus 1.1 % CREA, BRUSH TEETH ONCE DAILY, Disp: , Rfl:     aspirin 81 mg chewable tablet, Chew 1 tablet (81 mg total) 2 (two) times a day for 14 days, Disp: 28 tablet, Rfl: 0    ondansetron (ZOFRAN-ODT) 4 mg disintegrating tablet, Take 1 tablet (4 mg total) by mouth every 6 (six) hours as needed for nausea or vomiting (Patient not taking: Reported on 3/27/2024), Disp: 15 tablet, Rfl: 0    No Known Allergies    Review of Systems   Constitutional:  Negative for chills, fever and unexpected weight change.   HENT:  Negative for hearing loss, nosebleeds and sore throat.    Eyes:  Negative for pain, redness and visual disturbance.   Respiratory:  Negative for cough, shortness of breath and wheezing.    Cardiovascular:  Negative for chest pain, palpitations and leg swelling.   Gastrointestinal:  Negative for abdominal pain, nausea and vomiting.   Endocrine: Negative for polydipsia and polyuria.   Genitourinary:  Negative for dysuria and hematuria.   Skin:  Negative for rash and wound.   Neurological:  Negative for dizziness, numbness and headaches.   Psychiatric/Behavioral:  Negative for decreased concentration and suicidal ideas. The patient is not nervous/anxious.    All other systems reviewed and are  "negative.       Objective:  /79 (BP Location: Left arm, Patient Position: Sitting, Cuff Size: Standard)   Pulse 86   Ht 5' 8\" (1.727 m)   Wt 70.3 kg (155 lb)   LMP 03/13/2024 (Exact Date)   BMI 23.57 kg/m²     Ortho Exam  right ankle -   Patient ambulates with steady gait pattern  Uses No assistive device  No anatomical deformity  Skin is warm and dry to touch with no signs of erythema, ecchymosis, infection  No soft tissue swelling or effusion noted  ROM: DF 10°, PF 40°, Inv 10°, Ev 10°  No tenderness upon palpation  Strength: 5/5 throughout  - anterior drawer  - medial talar tilt, - lateral talar tilt  - syndesmotic squeeze  Calf compartments are soft and supple  2+ TP and DP pulses with brisk capillary refill to the toes  Sural, saphenous, tibial, superficial, and deep peroneal motor and sensory distributions intact  Sensation to light touch intact distally'    Physical Exam  HENT:      Head: Normocephalic and atraumatic.      Nose: Nose normal.   Eyes:      Conjunctiva/sclera: Conjunctivae normal.   Cardiovascular:      Rate and Rhythm: Normal rate.   Pulmonary:      Effort: Pulmonary effort is normal.   Musculoskeletal:      Cervical back: Neck supple.   Skin:     General: Skin is warm and dry.      Capillary Refill: Capillary refill takes less than 2 seconds.   Neurological:      Mental Status: She is alert and oriented to person, place, and time.   Psychiatric:         Mood and Affect: Mood normal.         Behavior: Behavior normal.          Diagnostic Test Review:    X-Ray of right ankle obtained on 4/11/2024 were reviewed and demonstrate surgical hardware intact, no evidence of hardware failure or loosening. Healed fibular fracture.      Procedures   None performed.     Scribe Attestation      I,:  Shavon Wilson am acting as a scribe while in the presence of the attending physician.:       I,:  Jason Dawson DO personally performed the services described in this documentation    as " scribed in my presence.:

## 2024-04-18 ENCOUNTER — EVALUATION (OUTPATIENT)
Dept: PHYSICAL THERAPY | Facility: CLINIC | Age: 44
End: 2024-04-18
Payer: COMMERCIAL

## 2024-04-18 DIAGNOSIS — Z98.890 S/P ORIF (OPEN REDUCTION INTERNAL FIXATION) FRACTURE: Primary | ICD-10-CM

## 2024-04-18 DIAGNOSIS — Z87.81 S/P ORIF (OPEN REDUCTION INTERNAL FIXATION) FRACTURE: Primary | ICD-10-CM

## 2024-04-18 PROCEDURE — 97110 THERAPEUTIC EXERCISES: CPT

## 2024-04-18 PROCEDURE — 97161 PT EVAL LOW COMPLEX 20 MIN: CPT

## 2024-04-18 PROCEDURE — 97140 MANUAL THERAPY 1/> REGIONS: CPT

## 2024-04-18 NOTE — PROGRESS NOTES
PT Evaluation     Today's date: 2024  Patient name: Chaka Stiles  : 1980  MRN: [id number]  Referring provider: Jason Dawson DO  Dx:   Encounter Diagnosis     ICD-10-CM    1. S/P ORIF (open reduction internal fixation) fracture  Z98.890 Ambulatory Referral to Physical Therapy    Z87.81           Start Time: 1300  Stop Time: 1350  Total time in clinic (min): 50 minutes    Assessment  Assessment details: The patient is a 44 y.o. female presenting to Physical Therapy today s/p R ankle ORIF performed on 24. Upon completion of the initial evaluation the patient is demonstrating with limitations in R ankle mobility, R ankle strength, R ankle stability, R knee strength, R hip strength, and pain. She is currently having difficulty with the performance of functional activities such as ambulation tolerance, standing tolerance, stair navigation, and squatting due to symptomatology. Patient would benefit from a course of skilled Physical Therapy services to address functional limitations to return to prior level of function. Thank you for your referral.   Impairments: abnormal or restricted ROM, activity intolerance, impaired balance, impaired physical strength, lacks appropriate home exercise program, pain with function and weight-bearing intolerance    Symptom irritability: lowUnderstanding of Dx/Px/POC: good   Prognosis: good    Goals  ST.) Patient will initiate HEP Independently   2.) Patient will have a 50% reduction in overall symptoms   3.) Patient will demonstrate improved strength evidenced by a 1-2 MMT grade increase  4.) Patient will demonstrate improved ability to ambulate >/= 1 mile w/o symptoms   5.) Patient will demonstrate improved ability to ascend/descend full flight of stairs w/o symptoms   6.) Patient will demonstrate improved ankle stability evidenced by R SLS time >/= 15 seconds     LT.) Patient will be d/c to an HEP independently  2.) Patient will improve functional  abilities evidenced by FOTO scores  3.) Eliminate pain with functional activity   4.) Patient will demonstrate improved ability to lift, squat, push, and pull w/o symptoms  5.) Return to PLOF  6.) Patient will demonstrate improved ankle stability evidenced by R SLS time >/= 30 seconds      Plan  Plan details: Plan of care was reviewed and discussed thoroughly with the patient on their current condition. Patient was instructed on a HEP with written instructions. Patient is in agreement with PT recommendations and will attend Physical Therapy 2x/week for the next 8 weeks to address current deficits.    Patient would benefit from: PT eval and skilled physical therapy  Referral necessary: No  Planned modality interventions: cryotherapy and thermotherapy: hydrocollator packs  Planned therapy interventions: flexibility, functional ROM exercises, gait training, graded activity, graded exercise, graded motor, home exercise program, joint mobilization, manual therapy, neuromuscular re-education, patient education, strengthening, stretching, therapeutic activities, therapeutic exercise, therapeutic training and balance  Frequency: 2x week  Duration in weeks: 8  Plan of Care beginning date: 4/18/2024  Plan of Care expiration date: 6/13/2024  Treatment plan discussed with: patient      Subjective Evaluation    History of Present Illness  Mechanism of injury: The patient reports today s/p ORIF of the R ankle performed on 1-24-24. She notes prior to surgery falling on her deck and twisting her R ankle. She followed up with the ED on 1-20-24 and had Xray imaging performed that revealed a distal fibular fracture and tiny avulsion fracture at the medial malleolus. She followed up with Ortho on 1-23-24 and had surgery the following day. Patient states since surgery pain levels have been manageable. She reports having difficulty with activities such as walking tolerance, standing tolerance, stair navigation, and squatting. She is now  referred to OPPT.           Not a recurrent problem   Quality of life: good    Patient Goals  Patient goals for therapy: decreased pain, increased motion, increased strength, independence with ADLs/IADLs, return to sport/leisure activities and improved balance    Pain  Current pain ratin  At best pain ratin  At worst pain ratin  Location: R ankle  Quality: discomfort and dull ache  Relieving factors: ice and medications  Aggravating factors: walking, standing and lifting (walking tolerance)    Social Support  Stairs in house: no     Employment status: not working    Diagnostic Tests  X-ray: normal  Treatments  Current treatment: physical therapy      Objective     Observations     Right Ankle/Foot   Positive for effusion and incision.     Additional Observation Details  R lateral ankle incision. No ecchymosis. Mild effusion.    Palpation     Right   No palpable tenderness to the lateral gastrocnemius, medial gastrocnemius, peroneus and posterior tibialis.     Tenderness     Right Ankle/Foot   No tenderness in the lateral malleolus.     Neurological Testing     Sensation     Ankle/Foot   Left Ankle/Foot   Intact: light touch    Right Ankle/Foot   Intact: light touch     Active Range of Motion   Left Hip   Normal active range of motion    Right Hip   Normal active range of motion  Left Knee   Normal active range of motion    Right Knee   Normal active range of motion  Left Ankle/Foot   Normal active range of motion    Right Ankle/Foot   Dorsiflexion (ke): -10 degrees   Plantar flexion: 40 degrees   Inversion: 10 degrees   Eversion: 10 degrees     Passive Range of Motion   Left Ankle/Foot  Normal passive range of motion    Right Ankle/Foot    Dorsiflexion (ke): -5 degrees   Plantar flexion: 45 degrees   Inversion: 15 degrees   Eversion: 15 degrees     Joint Play     Right Ankle/Foot  Hypomobile in the talocrural joint and subtalar joint.     Strength/Myotome Testing     Left Hip   Normal muscle  "strength    Right Hip   Planes of Motion   Flexion: 4-  Abduction: 4  Adduction: 4    Left Knee   Normal strength    Right Knee   Flexion: 4-  Extension: 4-  Quadriceps contraction: poor    Left Ankle/Foot   Normal strength    Right Ankle/Foot   Dorsiflexion: 4-  Plantar flexion: 4  Inversion: 4  Eversion: 4    Tests     Right Ankle/Foot   Negative for anterior drawer, eversion talar tilt, metatarsal squeeze and posterior drawer.     Ambulation     Comments   Patient ambulates w/o an AD.    Functional Assessment        Single Leg Stance   Left: 10 seconds  Right: 10 seconds             Precautions: s/p R ankle ORIF (1-24-24)       4/18/24            Manuals             R Ankle PROM all planes BM            STM  BM                                      Neuro Re-Ed             SLS on AE w hip drills              Lunge w/ BOSU             Tandem Amb              BAPS Board              SLS w/ cone taps                                        Ther Ex             Gastroc Stretch  20\" Hold x3            Soleus Stretch  20\" Hold x3            TB Inv/Ev/DF/PF L2 x10 ea            HR/TR x10            Wall Squats              Forward Step Ups             SLR             TM for muscular endurance              HEP Instruction  BM             Ther Activity                                       Gait Training                                       Modalities             Ice 5'                              "

## 2024-04-23 ENCOUNTER — APPOINTMENT (OUTPATIENT)
Dept: PHYSICAL THERAPY | Facility: CLINIC | Age: 44
End: 2024-04-23
Payer: COMMERCIAL

## 2024-04-24 ENCOUNTER — OFFICE VISIT (OUTPATIENT)
Dept: PHYSICAL THERAPY | Facility: CLINIC | Age: 44
End: 2024-04-24
Payer: COMMERCIAL

## 2024-04-24 DIAGNOSIS — Z87.81 S/P ORIF (OPEN REDUCTION INTERNAL FIXATION) FRACTURE: Primary | ICD-10-CM

## 2024-04-24 DIAGNOSIS — Z98.890 S/P ORIF (OPEN REDUCTION INTERNAL FIXATION) FRACTURE: Primary | ICD-10-CM

## 2024-04-24 PROCEDURE — 97140 MANUAL THERAPY 1/> REGIONS: CPT

## 2024-04-24 PROCEDURE — 97112 NEUROMUSCULAR REEDUCATION: CPT

## 2024-04-24 PROCEDURE — 97110 THERAPEUTIC EXERCISES: CPT

## 2024-04-24 NOTE — PROGRESS NOTES
"Daily Note     Today's date: 2024  Patient name: Chaka Stiles  : 1980  MRN: [id number]  Referring provider: Jason Dawson DO  Dx:   Encounter Diagnosis     ICD-10-CM    1. S/P ORIF (open reduction internal fixation) fracture  Z98.890     Z87.81           Start Time: 1100  Stop Time: 1145  Total time in clinic (min): 45 minutes    Subjective: Patient reports increased soreness in the R ankle this morning. She notes continued swelling at the end of the day in the R ankle. She notes difficulty with driving due to the R ankle feeling fatigued.      Objective: See treatment diary below      Assessment: Tolerated treatment well. We progressed the current program today with the addition of R ankle stability and strengthening exercises. Patient demonstrated a moderate loss in proprioceptive control with balance exercises requiring UE support for stability. Incision is healing well with proper scar mobility. We will continue to progress patient within tolerance. Patient demonstrated fatigue post treatment and would benefit from continued PT.      Plan: Continue per plan of care.      Precautions: s/p R ankle ORIF (24)       24           Manuals             R Ankle PROM all planes BM BM           STM  BM BM                                     Neuro Re-Ed             SLS on AE w hip drills   X10 ea direction           Lunge w/ BOSU             Tandem Amb   15\" Hold x3           BAPS Board              SLS w/ cone taps              SLS  15\" Hold x3 Firm Surface                        Ther Ex             Gastroc Stretch  20\" Hold x3 20\" Hold x3           Soleus Stretch  20\" Hold x3 20\" Hold x3            TB Inv/Ev/DF/PF L2 x10 ea L3 2x10 ea           HR/TR x10 2x10 4\" step            Wall Squats              Forward Step Ups             SLR             TM for muscular endurance   1.0 MPH 2.30 min 1.5 MPH 2.30 min           HEP Instruction  BM  BM           Ther Activity                  "                      Gait Training                                       Modalities             Ice 5' 5' Post TE

## 2024-04-26 ENCOUNTER — OFFICE VISIT (OUTPATIENT)
Dept: PHYSICAL THERAPY | Facility: CLINIC | Age: 44
End: 2024-04-26
Payer: COMMERCIAL

## 2024-04-26 DIAGNOSIS — Z87.81 S/P ORIF (OPEN REDUCTION INTERNAL FIXATION) FRACTURE: Primary | ICD-10-CM

## 2024-04-26 DIAGNOSIS — Z98.890 S/P ORIF (OPEN REDUCTION INTERNAL FIXATION) FRACTURE: Primary | ICD-10-CM

## 2024-04-26 PROCEDURE — 97140 MANUAL THERAPY 1/> REGIONS: CPT

## 2024-04-26 PROCEDURE — 97112 NEUROMUSCULAR REEDUCATION: CPT

## 2024-04-26 PROCEDURE — 97110 THERAPEUTIC EXERCISES: CPT

## 2024-04-27 DIAGNOSIS — J30.89 NON-SEASONAL ALLERGIC RHINITIS, UNSPECIFIED TRIGGER: ICD-10-CM

## 2024-04-27 RX ORDER — FLUTICASONE PROPIONATE 50 MCG
SPRAY, SUSPENSION (ML) NASAL
Qty: 16 G | Refills: 0 | Status: SHIPPED | OUTPATIENT
Start: 2024-04-27

## 2024-04-29 ENCOUNTER — OFFICE VISIT (OUTPATIENT)
Dept: PHYSICAL THERAPY | Facility: CLINIC | Age: 44
End: 2024-04-29
Payer: COMMERCIAL

## 2024-04-29 DIAGNOSIS — Z98.890 S/P ORIF (OPEN REDUCTION INTERNAL FIXATION) FRACTURE: Primary | ICD-10-CM

## 2024-04-29 DIAGNOSIS — Z87.81 S/P ORIF (OPEN REDUCTION INTERNAL FIXATION) FRACTURE: Primary | ICD-10-CM

## 2024-04-29 PROCEDURE — 97112 NEUROMUSCULAR REEDUCATION: CPT

## 2024-04-29 PROCEDURE — 97140 MANUAL THERAPY 1/> REGIONS: CPT

## 2024-04-29 PROCEDURE — 97110 THERAPEUTIC EXERCISES: CPT

## 2024-04-29 NOTE — PROGRESS NOTES
"Daily Note     Today's date: 2024  Patient name: Chaka Stiles  : 1980  MRN: [id number]  Referring provider: Jason Dawson DO  Dx:   Encounter Diagnosis     ICD-10-CM    1. S/P ORIF (open reduction internal fixation) fracture  Z98.890     Z87.81                      Subjective: Patient reports fatigue in the R ankle when driving and with prolonged activity. She notes range of motion is improving.       Objective: See treatment diary below      Assessment: Tolerated treatment well. We progressed the current program today with the addition of R ankle stability exercise. Patient demonstrated a moderate loss in proprioceptive control with this however was able to utilize ankle strategy to self correct balance control. We will continue to progress patient within tolerance. Patient would benefit from continued PT.      Plan: Continue per plan of care.      Precautions: s/p R ankle ORIF (24)       24      Manuals          R Ankle PROM all planes BM BM TS BM      STM  BM BM TS BM                          Neuro Re-Ed          SLS on AE w hip drills   X10 ea direction X10 ea 2x10 ea      Lunge w/ BOSU    3x20\" Hold       Tandem Amb   15\" Hold x3 15\" 3 laps       BAPS Board           SLS w/ cone taps           SLS  15\" Hold x3 Firm Surface 15\" hold x3 firm 15\" Hold x3 firm                 Ther Ex          Gastroc Stretch  20\" Hold x3 20\" Hold x3 20' ea 20\"Hold x4       Soleus Stretch  20\" Hold x3 20\" Hold x3  20\" HOLD  20\" Hold x4      TB Inv/Ev/DF/PF L2 x10 ea L3 2x10 ea L3 2x10 L4 2x10       HR/TR x10 2x10 4\" step  2x10 2x10      Wall Squats           Forward Step Ups          SLR          TM for muscular endurance   1.0 MPH 2.30 min 1.5 MPH 2.30 min 2.5 mph2.5--3.mph0 2.5 2.5 MPH 3.30 min 3.0 MPH 3.30 min       HEP Instruction  BM  BM        Ther Activity                              Gait Training                              Modalities          Ice 5' 5' Post TE 10 5' " Post TE

## 2024-04-30 ENCOUNTER — OFFICE VISIT (OUTPATIENT)
Dept: PHYSICAL THERAPY | Facility: CLINIC | Age: 44
End: 2024-04-30
Payer: COMMERCIAL

## 2024-04-30 DIAGNOSIS — Z98.890 S/P ORIF (OPEN REDUCTION INTERNAL FIXATION) FRACTURE: Primary | ICD-10-CM

## 2024-04-30 DIAGNOSIS — Z87.81 S/P ORIF (OPEN REDUCTION INTERNAL FIXATION) FRACTURE: Primary | ICD-10-CM

## 2024-04-30 PROCEDURE — 97112 NEUROMUSCULAR REEDUCATION: CPT

## 2024-04-30 PROCEDURE — 97110 THERAPEUTIC EXERCISES: CPT

## 2024-04-30 PROCEDURE — 97140 MANUAL THERAPY 1/> REGIONS: CPT

## 2024-04-30 NOTE — PROGRESS NOTES
"Daily Note     Today's date: 2024  Patient name: Chaka Stiles  : 1980  MRN: [id number]  Referring provider: Jason Dawson DO  Dx:   Encounter Diagnosis     ICD-10-CM    1. S/P ORIF (open reduction internal fixation) fracture  Z98.890     Z87.81           Start Time: 1100  Stop Time: 1150  Total time in clinic (min): 50 minutes    Subjective: Patient reports increased soreness in the R ankle yesterday. She notes swelling is decreasing.      Objective: See treatment diary below      Assessment: Tolerated treatment well. We progressed the current program today with the addition of balance exercise. Patient tolerated progression with a moderate loss in proprioceptive control requiring UE support. We will continue to progress patient within tolerance to address functional limitations. Patient would benefit from continued PT.       Plan: Continue per plan of care.      Precautions: s/p R ankle ORIF (24)       24     Manuals          R Ankle PROM all planes BM BM TS BM BM     STM  BM BM TS BM BM                         Neuro Re-Ed          SLS on AE w hip drills   X10 ea direction X10 ea 2x10 ea 2x10 ea     Lunge w/ BOSU    3x20\" Hold  3x20\" Hold      Tandem Amb   15\" Hold x3 15\" 3 laps  3 laps Fwd/Bwd     BAPS Board           SLS w/ cone taps      x3     SLS  15\" Hold x3 Firm Surface 15\" hold x3 firm 15\" Hold x3 firm  15\" Hold x3 AE               Ther Ex          Gastroc Stretch  20\" Hold x3 20\" Hold x3 20' ea 20\"Hold x4  20\" Hold x4     Soleus Stretch  20\" Hold x3 20\" Hold x3  20\" HOLD  20\" Hold x4 20\" Hold x4     TB Inv/Ev/DF/PF L2 x10 ea L3 2x10 ea L3 2x10 L4 2x10  L4 2x10      HR/TR x10 2x10 4\" step  2x10 2x10 2x10 4\" step      Wall Squats           Forward Step Ups          SLR          TM for muscular endurance   1.0 MPH 2.30 min 1.5 MPH 2.30 min 2.5 mph2.5--3.mph0 2.5 2.5 MPH 3.30 min 3.0 MPH 3.30 min  3.0 MPH 3.30 min 3.5 MPH     HEP Instruction  BM  BM     "    Ther Activity                              Gait Training                              Modalities          Ice 5' 5' Post TE 10 5' Post TE 5' Post TE

## 2024-05-07 ENCOUNTER — OFFICE VISIT (OUTPATIENT)
Dept: PHYSICAL THERAPY | Facility: CLINIC | Age: 44
End: 2024-05-07
Payer: COMMERCIAL

## 2024-05-07 DIAGNOSIS — Z98.890 S/P ORIF (OPEN REDUCTION INTERNAL FIXATION) FRACTURE: Primary | ICD-10-CM

## 2024-05-07 DIAGNOSIS — Z87.81 S/P ORIF (OPEN REDUCTION INTERNAL FIXATION) FRACTURE: Primary | ICD-10-CM

## 2024-05-07 PROCEDURE — 97140 MANUAL THERAPY 1/> REGIONS: CPT

## 2024-05-07 PROCEDURE — 97112 NEUROMUSCULAR REEDUCATION: CPT

## 2024-05-07 PROCEDURE — 97110 THERAPEUTIC EXERCISES: CPT

## 2024-05-07 NOTE — PROGRESS NOTES
"Daily Note     Today's date: 2024  Patient name: Chaka Stiles  : 1980  MRN: [id number]  Referring provider: Jason Dawson DO  Dx:   Encounter Diagnosis     ICD-10-CM    1. S/P ORIF (open reduction internal fixation) fracture  Z98.890     Z87.81           Start Time: 1100  Stop Time: 1145  Total time in clinic (min): 45 minutes    Subjective: Patient reports swelling in the R ankle at the end of the day when she is doing a lot of activities. She notes still having difficulty with balance.      Objective: See treatment diary below      Assessment: Tolerated treatment well. Patient demonstrated a moderate loss in proprioceptive control this afternoon with performance of balance exercises. Patient demonstrated fatigue post treatment, exhibited good technique with therapeutic exercises, and would benefit from continued PT to address symptomatology as well as functional limitations in the R ankle to return to prior level of function.       Plan: Continue per plan of care.      Precautions: s/p R ankle ORIF (24)       24    Manuals          R Ankle PROM all planes BM BM TS BM BM BM    STM  BM BM TS BM BM BM                        Neuro Re-Ed          SLS on AE w hip drills   X10 ea direction X10 ea 2x10 ea 2x10 ea 2x10 ea    Lunge w/ BOSU    3x20\" Hold  3x20\" Hold  3x20\" Hold     Tandem Amb   15\" Hold x3 15\" 3 laps  3 laps Fwd/Bwd 3 laps Fwd/Bwd     BAPS Board           SLS w/ cone taps      x3 x3    SLS  15\" Hold x3 Firm Surface 15\" hold x3 firm 15\" Hold x3 firm  15\" Hold x3 AE 15\" Hold x3 AE               Ther Ex          Gastroc Stretch  20\" Hold x3 20\" Hold x3 20' ea 20\"Hold x4  20\" Hold x4 20\" Hold x4    Soleus Stretch  20\" Hold x3 20\" Hold x3  20\" HOLD  20\" Hold x4 20\" Hold x4 20\" Hold x4    TB Inv/Ev/DF/PF L2 x10 ea L3 2x10 ea L3 2x10 L4 2x10  L4 2x10  L4 2x10     HR/TR x10 2x10 4\" step  2x10 2x10 2x10 4\" step  2x10 4\" step     Wall Squats         "   Forward Step Ups          SLR          TM for muscular endurance   1.0 MPH 2.30 min 1.5 MPH 2.30 min 2.5 mph2.5--3.mph0 2.5 2.5 MPH 3.30 min 3.0 MPH 3.30 min  3.0 MPH 3.30 min 3.5 MPH 3.0 MPH 5 min   3.5 MPH 5 min     HEP Instruction  BM  BM        Ther Activity                              Gait Training                              Modalities          Ice 5' 5' Post TE 10 5' Post TE 5' Post TE NT

## 2024-05-13 ENCOUNTER — APPOINTMENT (OUTPATIENT)
Dept: PHYSICAL THERAPY | Facility: CLINIC | Age: 44
End: 2024-05-13
Payer: COMMERCIAL

## 2024-05-24 ENCOUNTER — APPOINTMENT (EMERGENCY)
Dept: RADIOLOGY | Facility: HOSPITAL | Age: 44
End: 2024-05-24
Payer: COMMERCIAL

## 2024-05-24 ENCOUNTER — HOSPITAL ENCOUNTER (EMERGENCY)
Facility: HOSPITAL | Age: 44
Discharge: HOME | End: 2024-05-24
Attending: EMERGENCY MEDICINE
Payer: COMMERCIAL

## 2024-05-24 VITALS
WEIGHT: 150 LBS | HEIGHT: 69 IN | SYSTOLIC BLOOD PRESSURE: 144 MMHG | BODY MASS INDEX: 22.22 KG/M2 | HEART RATE: 72 BPM | OXYGEN SATURATION: 100 % | DIASTOLIC BLOOD PRESSURE: 85 MMHG | RESPIRATION RATE: 20 BRPM | TEMPERATURE: 98.8 F

## 2024-05-24 DIAGNOSIS — E83.42 HYPOMAGNESEMIA: ICD-10-CM

## 2024-05-24 DIAGNOSIS — R55 SYNCOPE: Primary | ICD-10-CM

## 2024-05-24 LAB
ALBUMIN SERPL BCP-MCNC: 4.7 G/DL (ref 3.5–5)
ALP SERPL-CCNC: 32 U/L (ref 34–104)
ALT SERPL W P-5'-P-CCNC: 13 U/L (ref 7–52)
ANION GAP SERPL CALCULATED.3IONS-SCNC: 11 MMOL/L (ref 4–13)
AST SERPL W P-5'-P-CCNC: 20 U/L (ref 13–39)
ATRIAL RATE: 79 BPM
BASOPHILS # BLD AUTO: 0.03 THOUSANDS/ÂΜL (ref 0–0.1)
BASOPHILS NFR BLD AUTO: 1 % (ref 0–1)
BILIRUB SERPL-MCNC: 0.54 MG/DL (ref 0.2–1)
BNP SERPL-MCNC: 22 PG/ML (ref 0–100)
BUN SERPL-MCNC: 7 MG/DL (ref 5–25)
CALCIUM SERPL-MCNC: 10.1 MG/DL (ref 8.4–10.2)
CARDIAC TROPONIN I PNL SERPL HS: <2 NG/L
CHLORIDE SERPL-SCNC: 99 MMOL/L (ref 96–108)
CO2 SERPL-SCNC: 24 MMOL/L (ref 21–32)
CREAT SERPL-MCNC: 0.86 MG/DL (ref 0.6–1.3)
EOSINOPHIL # BLD AUTO: 0.01 THOUSAND/ÂΜL (ref 0–0.61)
EOSINOPHIL NFR BLD AUTO: 0 % (ref 0–6)
ERYTHROCYTE [DISTWIDTH] IN BLOOD BY AUTOMATED COUNT: 11.4 % (ref 11.6–15.1)
GFR SERPL CREATININE-BSD FRML MDRD: 83 ML/MIN/1.73SQ M
GLUCOSE SERPL-MCNC: 96 MG/DL (ref 65–140)
HCT VFR BLD AUTO: 39.9 % (ref 34.8–46.1)
HGB BLD-MCNC: 13.6 G/DL (ref 11.5–15.4)
IMM GRANULOCYTES # BLD AUTO: 0.01 THOUSAND/UL (ref 0–0.2)
IMM GRANULOCYTES NFR BLD AUTO: 0 % (ref 0–2)
LYMPHOCYTES # BLD AUTO: 1.34 THOUSANDS/ÂΜL (ref 0.6–4.47)
LYMPHOCYTES NFR BLD AUTO: 20 % (ref 14–44)
MAGNESIUM SERPL-MCNC: 1.7 MG/DL (ref 1.9–2.7)
MCH RBC QN AUTO: 33.8 PG (ref 26.8–34.3)
MCHC RBC AUTO-ENTMCNC: 34.1 G/DL (ref 31.4–37.4)
MCV RBC AUTO: 99 FL (ref 82–98)
MONOCYTES # BLD AUTO: 0.82 THOUSAND/ÂΜL (ref 0.17–1.22)
MONOCYTES NFR BLD AUTO: 12 % (ref 4–12)
NEUTROPHILS # BLD AUTO: 4.45 THOUSANDS/ÂΜL (ref 1.85–7.62)
NEUTS SEG NFR BLD AUTO: 67 % (ref 43–75)
NRBC BLD AUTO-RTO: 0 /100 WBCS
P AXIS: 72 DEGREES
PLATELET # BLD AUTO: 163 THOUSANDS/UL (ref 149–390)
PMV BLD AUTO: 10 FL (ref 8.9–12.7)
POTASSIUM SERPL-SCNC: 3.8 MMOL/L (ref 3.5–5.3)
PR INTERVAL: 136 MS
PROT SERPL-MCNC: 7.2 G/DL (ref 6.4–8.4)
QRS AXIS: 78 DEGREES
QRSD INTERVAL: 74 MS
QT INTERVAL: 390 MS
QTC INTERVAL: 447 MS
RBC # BLD AUTO: 4.02 MILLION/UL (ref 3.81–5.12)
SODIUM SERPL-SCNC: 134 MMOL/L (ref 135–147)
T WAVE AXIS: 77 DEGREES
TSH SERPL DL<=0.05 MIU/L-ACNC: 1.03 UIU/ML (ref 0.45–4.5)
VENTRICULAR RATE: 79 BPM
WBC # BLD AUTO: 6.66 THOUSAND/UL (ref 4.31–10.16)

## 2024-05-24 PROCEDURE — 96365 THER/PROPH/DIAG IV INF INIT: CPT

## 2024-05-24 PROCEDURE — 85025 COMPLETE CBC W/AUTO DIFF WBC: CPT | Performed by: EMERGENCY MEDICINE

## 2024-05-24 PROCEDURE — 83735 ASSAY OF MAGNESIUM: CPT | Performed by: EMERGENCY MEDICINE

## 2024-05-24 PROCEDURE — 84443 ASSAY THYROID STIM HORMONE: CPT | Performed by: EMERGENCY MEDICINE

## 2024-05-24 PROCEDURE — 99284 EMERGENCY DEPT VISIT MOD MDM: CPT

## 2024-05-24 PROCEDURE — 96361 HYDRATE IV INFUSION ADD-ON: CPT

## 2024-05-24 PROCEDURE — 84484 ASSAY OF TROPONIN QUANT: CPT | Performed by: EMERGENCY MEDICINE

## 2024-05-24 PROCEDURE — 36415 COLL VENOUS BLD VENIPUNCTURE: CPT | Performed by: EMERGENCY MEDICINE

## 2024-05-24 PROCEDURE — 99285 EMERGENCY DEPT VISIT HI MDM: CPT | Performed by: EMERGENCY MEDICINE

## 2024-05-24 PROCEDURE — 93005 ELECTROCARDIOGRAM TRACING: CPT

## 2024-05-24 PROCEDURE — 83880 ASSAY OF NATRIURETIC PEPTIDE: CPT | Performed by: EMERGENCY MEDICINE

## 2024-05-24 PROCEDURE — 80053 COMPREHEN METABOLIC PANEL: CPT | Performed by: EMERGENCY MEDICINE

## 2024-05-24 PROCEDURE — 71045 X-RAY EXAM CHEST 1 VIEW: CPT

## 2024-05-24 RX ORDER — MAGNESIUM SULFATE HEPTAHYDRATE 40 MG/ML
2 INJECTION, SOLUTION INTRAVENOUS ONCE
Status: COMPLETED | OUTPATIENT
Start: 2024-05-24 | End: 2024-05-24

## 2024-05-24 RX ORDER — POTASSIUM CHLORIDE 20 MEQ/1
20 TABLET, EXTENDED RELEASE ORAL ONCE
Status: COMPLETED | OUTPATIENT
Start: 2024-05-24 | End: 2024-05-24

## 2024-05-24 RX ADMIN — MAGNESIUM SULFATE HEPTAHYDRATE 2 G: 40 INJECTION, SOLUTION INTRAVENOUS at 18:36

## 2024-05-24 RX ADMIN — SODIUM CHLORIDE 1000 ML: 0.9 INJECTION, SOLUTION INTRAVENOUS at 17:46

## 2024-05-24 RX ADMIN — POTASSIUM CHLORIDE 20 MEQ: 1500 TABLET, EXTENDED RELEASE ORAL at 18:37

## 2024-05-24 NOTE — ED PROVIDER NOTES
History  Chief Complaint   Patient presents with    Syncope     Patient reports that she was standing up talking and suddenly felt lightheaded and states her vision went black. States that she lowered herself to the ground. Pt reports that she feels back to her baseline.      44-year-old female presents for evaluation of syncopal episode.  Patient states today she arrived at work and was standing talking to someone when she felt a lightheadedness and warm sensation.  Patient then had tunneling vision and lowered herself to the ground.  Patient states she is does not believe she fully lost consciousness that she was hearing people talk around her.  Patient recovered and was able to sit to a chair where she had recurrent lightheaded and warm sensation however did not have vision changes and recovered.  Patient denies chest pain or dyspnea over this episodes, nausea or vomiting episodes.  Patient states yesterday she had decreased oral intake as well as an episode of nausea, patient states normally when her sinuses are acting up she experiences this due to postnasal drip.  She denies known cardiac history in herself or immediate family members.  This is the first time something like this has happened to her.  LMP about 1.5 weeks ago and normal, states history of tubal ligation. Patient is otherwise been in her usual state of health.        Prior to Admission Medications   Prescriptions Last Dose Informant Patient Reported? Taking?   SF 5000 Plus 1.1 % CREA   Yes No   Sig: BRUSH TEETH ONCE DAILY   aspirin 81 mg chewable tablet   No No   Sig: Chew 1 tablet (81 mg total) 2 (two) times a day for 14 days   calcium carbonate (OS-BEKA) 600 MG tablet   Yes No   Sig: Take 600 mg by mouth daily   fexofenadine (ALLEGRA) 180 MG tablet   Yes No   Sig: Take 180 mg by mouth daily   fluticasone (FLONASE) 50 mcg/act nasal spray   No No   Sig: Use 1 spray(s) in each nostril once daily   loratadine (CLARITIN) 10 mg tablet   Yes No   Sig:  Take 10 mg by mouth daily   meloxicam (Mobic) 15 mg tablet   No No   Sig: Take 1 tablet (15 mg total) by mouth daily   multivitamin (THERAGRAN) TABS   Yes No   Sig: Take 1 tablet by mouth daily   ondansetron (ZOFRAN-ODT) 4 mg disintegrating tablet   No No   Sig: Take 1 tablet (4 mg total) by mouth every 6 (six) hours as needed for nausea or vomiting   Patient not taking: Reported on 3/27/2024      Facility-Administered Medications: None       Past Medical History:   Diagnosis Date    Abnormal Pap smear of cervix     GERD (gastroesophageal reflux disease)     Herpes     Psychiatric disorder     depression, anxiety    Recurrent urinary tract infection 03/22/2018       Past Surgical History:   Procedure Laterality Date    KNEE SURGERY      ORIF TIBIA & FIBULA FRACTURES Right 1/24/2024    Procedure: OPEN REDUCTION W/ INTERNAL FIXATION (ORIF) ANKLE;  Surgeon: Jason Dawson DO;  Location: [facility] OR;  Service: Orthopedics    TUBAL LIGATION         Family History   Problem Relation Age of Onset    No Known Problems Mother     Cancer Father         type unknown    Heart disease Father     Heart attack Father     Stroke Father     Other Father         transplant kidney  or  liver    Drug abuse Sister     No Known Problems Daughter     Heart murmur Son     Colon cancer Maternal Grandmother     Heart disease Maternal Grandfather     Heart attack Maternal Grandfather     Dementia Paternal Grandmother     Lung disease Paternal Grandfather     No Known Problems Paternal Aunt     No Known Problems Paternal Aunt     BRCA2 Positive Neg Hx     BRCA1 Negative Neg Hx     Ovarian cancer Neg Hx     Endometrial cancer Neg Hx     BRCA 1/2 Neg Hx     Breast cancer Neg Hx      I have reviewed and agree with the history as documented.    E-Cigarette/Vaping    E-Cigarette Use Never User      E-Cigarette/Vaping Substances    Nicotine No     THC No     CBD No     Flavoring No     Other No     Unknown No      Social History     Tobacco Use     Smoking status: Some Days     Current packs/day: 0.50     Types: Cigarettes    Smokeless tobacco: Never   Vaping Use    Vaping status: Never Used   Substance Use Topics    Alcohol use: Yes     Comment: socially    Drug use: Never     Comment: medical marijuana       Review of Systems   Constitutional:  Negative for activity change and appetite change.   HENT:  Positive for sinus pressure.    Respiratory:  Negative for shortness of breath.    Cardiovascular:  Negative for chest pain.   Gastrointestinal:  Negative for abdominal pain.   Genitourinary:  Negative for dysuria and menstrual problem.   Neurological:  Positive for syncope and light-headedness. Negative for headaches.   All other systems reviewed and are negative.      Physical Exam  Physical Exam  Vitals reviewed.   Constitutional:       General: She is not in acute distress.     Appearance: Normal appearance. She is not ill-appearing, toxic-appearing or diaphoretic.   HENT:      Head: Normocephalic and atraumatic.      Right Ear: External ear normal.      Left Ear: External ear normal.      Nose: Nose normal.   Eyes:      General: No scleral icterus.        Right eye: No discharge.         Left eye: No discharge.      Extraocular Movements: Extraocular movements intact.   Cardiovascular:      Rate and Rhythm: Normal rate and regular rhythm.   Pulmonary:      Effort: Pulmonary effort is normal. No respiratory distress.      Breath sounds: Normal breath sounds.   Musculoskeletal:         General: No deformity or signs of injury.   Skin:     General: Skin is warm.      Coloration: Skin is not jaundiced or pale.   Neurological:      General: No focal deficit present.      Mental Status: She is alert and oriented to person, place, and time. Mental status is at baseline.      Gait: Gait normal.         Vital Signs  ED Triage Vitals [05/24/24 1704]   Temperature Pulse Respirations Blood Pressure SpO2   98.8 °F (37.1 °C) 71 18 144/85 99 %      Temp Source Heart  Rate Source Patient Position - Orthostatic VS BP Location FiO2 (%)   Temporal Monitor Sitting Left arm --      Pain Score       No Pain           Vitals:    05/24/24 1704 05/24/24 1845   BP: 144/85    Pulse: 71 72   Patient Position - Orthostatic VS: Sitting          Visual Acuity      ED Medications  Medications   sodium chloride 0.9 % bolus 1,000 mL (0 mL Intravenous Stopped 5/24/24 1923)   magnesium sulfate 2 g/50 mL IVPB (premix) 2 g (0 g Intravenous Stopped 5/24/24 1923)   potassium chloride (Klor-Con M20) CR tablet 20 mEq (20 mEq Oral Given 5/24/24 1837)       Diagnostic Studies  Results Reviewed       Procedure Component Value Units Date/Time    TSH, 3rd generation with Free T4 reflex [[id number]]  (Normal) Collected: 05/24/24 1742    Lab Status: Final result Specimen: Blood from Arm, Left Updated: 05/24/24 1827     TSH 3RD GENERATON 1.033 uIU/mL     HS Troponin 0hr (reflex protocol) [[id number]]  (Normal) Collected: 05/24/24 1742    Lab Status: Final result Specimen: Blood from Arm, Left Updated: 05/24/24 1818     hs TnI 0hr <2 ng/L     B-Type Natriuretic Peptide(BNP) [[id number]]  (Normal) Collected: 05/24/24 1742    Lab Status: Final result Specimen: Blood from Arm, Left Updated: 05/24/24 1817     BNP 22 pg/mL     Comprehensive metabolic panel [[id number]]  (Abnormal) Collected: 05/24/24 1742    Lab Status: Final result Specimen: Blood from Arm, Left Updated: 05/24/24 1811     Sodium 134 mmol/L      Potassium 3.8 mmol/L      Chloride 99 mmol/L      CO2 24 mmol/L      ANION GAP 11 mmol/L      BUN 7 mg/dL      Creatinine 0.86 mg/dL      Glucose 96 mg/dL      Calcium 10.1 mg/dL      AST 20 U/L      ALT 13 U/L      Alkaline Phosphatase 32 U/L      Total Protein 7.2 g/dL      Albumin 4.7 g/dL      Total Bilirubin 0.54 mg/dL      eGFR 83 ml/min/1.73sq m     Narrative:      National Kidney Disease Foundation guidelines for Chronic Kidney Disease (CKD):     Stage 1 with normal or high GFR (GFR > 90 mL/min/1.73 square  meters)    Stage 2 Mild CKD (GFR = 60-89 mL/min/1.73 square meters)    Stage 3A Moderate CKD (GFR = 45-59 mL/min/1.73 square meters)    Stage 3B Moderate CKD (GFR = 30-44 mL/min/1.73 square meters)    Stage 4 Severe CKD (GFR = 15-29 mL/min/1.73 square meters)    Stage 5 End Stage CKD (GFR <15 mL/min/1.73 square meters)  Note: GFR calculation is accurate only with a steady state creatinine    Magnesium [[id number]]  (Abnormal) Collected: 05/24/24 1742    Lab Status: Final result Specimen: Blood from Arm, Left Updated: 05/24/24 1811     Magnesium 1.7 mg/dL     CBC and differential [[id number]]  (Abnormal) Collected: 05/24/24 1742    Lab Status: Final result Specimen: Blood from Arm, Left Updated: 05/24/24 1755     WBC 6.66 Thousand/uL      RBC 4.02 Million/uL      Hemoglobin 13.6 g/dL      Hematocrit 39.9 %      MCV 99 fL      MCH 33.8 pg      MCHC 34.1 g/dL      RDW 11.4 %      MPV 10.0 fL      Platelets 163 Thousands/uL      nRBC 0 /100 WBCs      Segmented % 67 %      Immature Grans % 0 %      Lymphocytes % 20 %      Monocytes % 12 %      Eosinophils Relative 0 %      Basophils Relative 1 %      Absolute Neutrophils 4.45 Thousands/µL      Absolute Immature Grans 0.01 Thousand/uL      Absolute Lymphocytes 1.34 Thousands/µL      Absolute Monocytes 0.82 Thousand/µL      Eosinophils Absolute 0.01 Thousand/µL      Basophils Absolute 0.03 Thousands/µL                    XR chest 1 view portable   Final Result by Diana Gardiner MD (05/24 2129)      No acute cardiopulmonary disease.            Workstation performed: [id number]                    Procedures  Procedures         ED Course  ED Course as of 05/24/24 2239   Fri May 24, 2024   1717 Procedure Note: EKG  Date/Time: 05/24/24 5:17 PM   Interpreted by: Devi Godoy  Indications / Diagnosis: syncope  ECG reviewed by me, the ED Provider: yes   The EKG demonstrates:  Rhythm: normal sinus  Intervals: normal intervals  Axis: normal axis  QRS/Blocks: normal QRS  ST  Changes: No acute ST Changes, no STD/JEANINE.       1818 BNP: 22  negative   1823 hs TnI 0hr: <2  negative   1824 XR chest 1 view portable  On my interpretation, no acute cardiopulmonary abnormality, no cardiomegaly or ptx; previous rib fractures   1829 TSH 3RD GENERATON: 1.033  normal   1851 Updated patient to results, possible dehydration vs vasovagal vs orthostatic syncope at this time. No evidence of arrhythmia. Patient has pre-scheduled PCP appointment next week for physical, can RTED if symptoms worsened.              HEART Risk Score      Flowsheet Row Most Recent Value   Heart Score Risk Calculator    History 0 Filed at: 05/24/2024 1735   ECG 0 Filed at: 05/24/2024 1735   Age 0 Filed at: 05/24/2024 1735   Risk Factors 0 Filed at: 05/24/2024 1735   Troponin 0 Filed at: 05/24/2024 1735   HEART Score 0 Filed at: 05/24/2024 1735                  PERC Rule for PE      Flowsheet Row Most Recent Value   PERC Rule for PE    Age >=50 0 Filed at: 05/24/2024 1735   HR >=100 0 Filed at: 05/24/2024 1735   O2 Sat on room air < 95% 0 Filed at: 05/24/2024 1735   History of PE or DVT 0 Filed at: 05/24/2024 1735   Recent trauma or surgery 0 Filed at: 05/24/2024 1735   Hemoptysis 0 Filed at: 05/24/2024 1735   Exogenous estrogen 0 Filed at: 05/24/2024 1735   Unilateral leg swelling 0 Filed at: 05/24/2024 1735   PERC Rule for PE Results 0 Filed at: 05/24/2024 1735                SBIRT 22yo+      Flowsheet Row Most Recent Value   Initial Alcohol Screen: US AUDIT-C     1. How often do you have a drink containing alcohol? 0 Filed at: 05/24/2024 1705   2. How many drinks containing alcohol do you have on a typical day you are drinking?  0 Filed at: 05/24/2024 1705   3a. Male UNDER 65: How often do you have five or more drinks on one occasion? 0 Filed at: 05/24/2024 1705   3b. FEMALE Any Age, or MALE 65+: How often do you have 4 or more drinks on one occassion? 0 Filed at: 05/24/2024 1708   Audit-C Score 0 Filed at: 05/24/2024 170    MAGUE: How many times in the past year have you...    Used an illegal drug or used a prescription medication for non-medical reasons? Never Filed at: 05/24/2024 1705                      Medical Decision Making  44-year-old female presents for evaluation of syncope event.  Patient states feeling a lightheadedness and warm sensation prior to events, denies chest pain, dyspnea.  Patient does state yesterday decreased appetite and oral intake, this morning patient had 2 cups of coffee as well as 2 cups of water.  Symptoms may be related to dehydration, vasovagal.  Will check a troponin and EKG to assess for ACS, arrhythmia.  Will additionally obtain a chest x-ray to assess for cardiomegaly.  Will check labs to assess for electrolyte abnormality, anemia as a source of symptoms.  She can likely be discharged home and follow-up with primary care, will additionally provide cardiology information if symptoms recur, she may require additional testing.    Amount and/or Complexity of Data Reviewed  Labs: ordered. Decision-making details documented in ED Course.  Radiology: ordered. Decision-making details documented in ED Course.    Risk  Prescription drug management.             Disposition  Final diagnoses:   Syncope   Hypomagnesemia     Time reflects when diagnosis was documented in both MDM as applicable and the Disposition within this note       Time User Action Codes Description Comment    5/24/2024  6:45 PM Deiv Godoy Add [R55] Syncope     5/24/2024  6:45 PM Devi Godoy Add [E83.42] Hypomagnesemia           ED Disposition       ED Disposition   Discharge    Condition   Stable    Date/Time   Fri May 24, 2024 1845    Comment   Chaka Stiles discharge to home/self care.                   Follow-up Information       Follow up With Specialties Details Why Contact Info Additional Information    Morena Marshall,  Internal Medicine, Hospice Services Schedule an appointment as soon as possible for a visit    [address] [facility] [address]  [phone]       [facility] Cardiology [facility] Cardiology  As needed [address] [facility] [phone] [facility], [address] [place], [place], [address] [phone]    [facility] Emergency Department Emergency Medicine  If symptoms worsen [address] [facility] [phone] [facility] Emergency Department, [address] [place], [place], [address]            Discharge Medication List as of 5/24/2024  6:53 PM        CONTINUE these medications which have NOT CHANGED    Details   aspirin 81 mg chewable tablet Chew 1 tablet (81 mg total) 2 (two) times a day for 14 days, Starting Wed 1/24/2024, Until Tue 4/9/2024, Normal      calcium carbonate (OS-BEKA) 600 MG tablet Take 600 mg by mouth daily, Historical Med      fexofenadine (ALLEGRA) 180 MG tablet Take 180 mg by mouth daily, Historical Med      fluticasone (FLONASE) 50 mcg/act nasal spray Use 1 spray(s) in each nostril once daily, Normal      loratadine (CLARITIN) 10 mg tablet Take 10 mg by mouth daily, Historical Med      meloxicam (Mobic) 15 mg tablet Take 1 tablet (15 mg total) by mouth daily, Starting Thu 2/23/2023, Normal      multivitamin (THERAGRAN) TABS Take 1 tablet by mouth daily, Historical Med      ondansetron (ZOFRAN-ODT) 4 mg disintegrating tablet Take 1 tablet (4 mg total) by mouth every 6 (six) hours as needed for nausea or vomiting, Starting Wed 12/6/2023, Normal      SF 5000 Plus 1.1 % CREA BRUSH TEETH ONCE DAILY, Historical Med             No discharge procedures on file.    PDMP Review         Value Time User    PDMP Reviewed  Yes 1/24/2024  2:05 PM Hola Dhillon PA-C            ED Provider  Electronically Signed by             Devi Godoy DO  05/24/24 6111

## 2024-05-27 LAB
ATRIAL RATE: 79 BPM
P AXIS: 72 DEGREES
PR INTERVAL: 136 MS
QRS AXIS: 78 DEGREES
QRSD INTERVAL: 74 MS
QT INTERVAL: 390 MS
QTC INTERVAL: 447 MS
T WAVE AXIS: 77 DEGREES
VENTRICULAR RATE: 79 BPM

## 2024-05-27 PROCEDURE — 93010 ELECTROCARDIOGRAM REPORT: CPT | Performed by: INTERNAL MEDICINE

## 2024-05-28 ENCOUNTER — TELEPHONE (OUTPATIENT)
Age: 44
End: 2024-05-28

## 2024-05-28 NOTE — TELEPHONE ENCOUNTER
Patient request call back a little bit later as she is currently in another appointment. States she has 2 clinical questions.

## 2024-05-31 ENCOUNTER — OFFICE VISIT (OUTPATIENT)
Dept: FAMILY MEDICINE CLINIC | Facility: CLINIC | Age: 44
End: 2024-05-31
Payer: COMMERCIAL

## 2024-05-31 ENCOUNTER — TELEPHONE (OUTPATIENT)
Dept: FAMILY MEDICINE CLINIC | Facility: CLINIC | Age: 44
End: 2024-05-31

## 2024-05-31 ENCOUNTER — NURSE TRIAGE (OUTPATIENT)
Age: 44
End: 2024-05-31

## 2024-05-31 VITALS
WEIGHT: 150.6 LBS | BODY MASS INDEX: 22.31 KG/M2 | OXYGEN SATURATION: 98 % | HEIGHT: 69 IN | TEMPERATURE: 98 F | HEART RATE: 76 BPM | RESPIRATION RATE: 18 BRPM | SYSTOLIC BLOOD PRESSURE: 124 MMHG | DIASTOLIC BLOOD PRESSURE: 78 MMHG

## 2024-05-31 DIAGNOSIS — Z00.00 ANNUAL PHYSICAL EXAM: Primary | ICD-10-CM

## 2024-05-31 DIAGNOSIS — R55 SYNCOPE, UNSPECIFIED SYNCOPE TYPE: ICD-10-CM

## 2024-05-31 PROBLEM — N89.8 VAGINAL ODOR: Status: ACTIVE | Noted: 2024-05-31

## 2024-05-31 PROCEDURE — 99396 PREV VISIT EST AGE 40-64: CPT | Performed by: INTERNAL MEDICINE

## 2024-05-31 RX ORDER — DIPHENHYDRAMINE HCL 25 MG
25 CAPSULE ORAL EVERY 6 HOURS PRN
COMMUNITY

## 2024-05-31 RX ORDER — CETIRIZINE HYDROCHLORIDE 10 MG/1
10 TABLET ORAL DAILY
COMMUNITY

## 2024-05-31 RX ORDER — MULTIVITAMIN WITH IRON
250 TABLET ORAL DAILY
COMMUNITY

## 2024-05-31 NOTE — TELEPHONE ENCOUNTER
"Patient called office c/o vaginal odor of feces when having intercourse. She explains the odor happens as soon as penetration occurs. She does not have this odor any other time. She denies any pain, discharge, bleeding, vaginal or abdominal pain or any other symptoms at this time.  She confirms the odor is vaginal and not rectal. She was seen by PCP today and told to see either GYN or colorectal for this issue. Patient scheduled for office appointment next week, routing to provider for further advice or if colorectal referral is more appropriate.   Advised to call back if symptoms worsen, pain occurs         Reason for Disposition   All other vaginal symptoms (Exception: feels like prior yeast infection, minor abrasion, mild rash < 24 hour duration, mild itching)    Answer Assessment - Initial Assessment Questions  1. SYMPTOM: \"What's the main symptom you're concerned about?\" (e.g., pain, itching, dryness)      Odor of feces with intercourse.   2. LOCATION: \"Where is the  odor located?\" (e.g., inside/outside, left/right)      vaginally  3. ONSET: \"When did the  odor  start?\"      5/19  4. PAIN: \"Is there any pain?\" If Yes, ask: \"How bad is it?\" (Scale: 1-10; mild, moderate, severe)      No   5. ITCHING: \"Is there any itching?\" If Yes, ask: \"How bad is it?\" (Scale: 1-10; mild, moderate, severe)      NO   6. CAUSE: \"What do you think is causing the discharge?\" \"Have you had the same problem before? What happened then?\"      Unknown   7. OTHER SYMPTOMS: \"Do you have any other symptoms?\" (e.g., fever, itching, vaginal bleeding, pain with urination, injury to genital area, vaginal foreign body)      No   8. PREGNANCY: \"Is there any chance you are pregnant?\" \"When was your last menstrual period?\"      No, LMP: about 3 weeks ago    Protocols used: Vaginal Symptoms-ADULT-[place]    "

## 2024-05-31 NOTE — PROGRESS NOTES
Adult Annual Physical  Name: Chaka Stiles      : 1980      MRN: [id number]  Encounter Provider: Morena Marshall DO  Encounter Date: 2024   Encounter department: [place] [facility]    Assessment & Plan   1. Annual physical exam  Stay hydrated Be conscious of eating regularly, increase protein, several small meals thru the day  2. Syncope, unspecified syncope type  -     Echo complete w/ contrast if indicated; Future; Expected date: 2024  -     Ambulatory Referral to Cardiology; Future  ? Hypoglycemia factor Check echo and cardio eval for monitoring to rule out cardiovascular cause of recent sxs Doubt seizure activity     Immunizations and preventive care screenings were discussed with patient today. Appropriate education was printed on patient's after visit summary.    Counseling:  Exercise: the importance of regular exercise/physical activity was discussed. Recommend exercise 3-5 times per week for at least 30 minutes.       Depression Screening and Follow-up Plan: Patient was screened for depression during today's encounter. They screened negative with a PHQ-2 score of 2.    Tobacco Cessation Counseling: Tobacco cessation counseling was provided. The patient is sincerely urged to quit consumption of tobacco. She is not ready to quit tobacco.       Pt is reaching out to Gyn for ? Rectal floor issue with recent intercourse Did mention colorectal services at [facility] if needed for further eval  History of Present Illness   Pt was in ER recently for syncope Unclear etiology No recurrent sxs She does not eat big meals so unsure if BS a factor No chest pain or sob preceding She has been hydrating well with mainly water No headache Her ankle injury earlier in the year is stable and she completed PT She has reached out to GYN for concern of rectal floor issues during recent intercourse No discharge but foul smelling odor and pressure sensation Bowels have been regular No bleeding     Adult  "Annual Physical:  Patient presents for annual physical.     Diet and Physical Activity:  - Diet/Nutrition: well balanced diet.  - Exercise: walking.    Depression Screening:  - PHQ-2 Score: 2    General Health:  - Sleep: 7-8 hours of sleep on average.  - Hearing: normal hearing bilateral ears.  - Vision: no vision problems and goes for regular eye exams.  - Dental: regular dental visits.    /GYN Health:  - Follows with GYN: yes.   - Menopause: premenopausal.   - History of STDs: no    Advanced Care Planning:  - Has an advanced directive?: no    - Has a durable medical POA?: no    - ACP document given to patient?: no      Review of Systems   Constitutional:  Negative for chills and fever.   HENT: Negative.     Eyes:  Negative for visual disturbance.   Respiratory:  Negative for cough and shortness of breath.    Cardiovascular:  Negative for chest pain, palpitations and leg swelling.   Gastrointestinal:  Negative for abdominal distention and abdominal pain.   Genitourinary: Negative.    Musculoskeletal: Negative.    Neurological:  Positive for headaches. Negative for dizziness and light-headedness.   Psychiatric/Behavioral:  Negative for sleep disturbance.         Doing better on medical marijuana          Objective     /78   Pulse 76   Temp 98 °F (36.7 °C) (Temporal)   Resp 18   Ht 5' 8.5\" (1.74 m)   Wt 68.3 kg (150 lb 9.6 oz)   LMP 05/14/2024   SpO2 98%   BMI 22.57 kg/m²     Physical Exam  Vitals and nursing note reviewed.   Constitutional:       General: She is not in acute distress.     Appearance: Normal appearance. She is not ill-appearing, toxic-appearing or diaphoretic.   HENT:      Head: Normocephalic and atraumatic.      Right Ear: External ear normal.      Left Ear: External ear normal.      Nose: Nose normal.      Mouth/Throat:      Mouth: Mucous membranes are moist.   Eyes:      General: No scleral icterus.     Extraocular Movements: Extraocular movements intact.      Conjunctiva/sclera: " Conjunctivae normal.      Pupils: Pupils are equal, round, and reactive to light.   Cardiovascular:      Rate and Rhythm: Normal rate and regular rhythm.      Pulses: Normal pulses.   Pulmonary:      Effort: Pulmonary effort is normal. No respiratory distress.      Breath sounds: Normal breath sounds. No wheezing.   Abdominal:      General: Bowel sounds are normal. There is no distension.      Palpations: Abdomen is soft.      Tenderness: There is no abdominal tenderness.   Musculoskeletal:      Cervical back: Normal range of motion and neck supple.      Right lower leg: No edema.      Left lower leg: No edema.   Lymphadenopathy:      Cervical: No cervical adenopathy.   Skin:     General: Skin is warm and dry.   Neurological:      General: No focal deficit present.      Mental Status: She is alert and oriented to person, place, and time. Mental status is at baseline.   Psychiatric:         Mood and Affect: Mood normal.         Behavior: Behavior normal.         Thought Content: Thought content normal.         Judgment: Judgment normal.       Administrative Statements

## 2024-06-03 NOTE — TELEPHONE ENCOUNTER
Placed call to patient, no answer, left a non detailed voice message to call back with phone number provided [phone]

## 2024-06-07 ENCOUNTER — TELEPHONE (OUTPATIENT)
Dept: CARDIOLOGY CLINIC | Facility: CLINIC | Age: 44
End: 2024-06-07

## 2024-06-07 NOTE — TELEPHONE ENCOUNTER
Caller: andraleen    Doctor/Office: central scheduling    Call regarding :  rescheduling echo     Call was transferred to: central scheduling

## 2024-06-18 NOTE — PROGRESS NOTES
"Assessment/Plan:  Bacterial vaginosis noted on wet mount.   Rx sent to requested pharmacy. No sex during treatment. Complete all medication as directed.   Consider daily probiotic.  Healthy diet recommended.   Call with any recurrence of symptoms.        1. BV (bacterial vaginosis)  -     POCT wet mount  -     metroNIDAZOLE (FLAGYL) 500 mg tablet; Take 1 tablet (500 mg total) by mouth every 12 (twelve) hours for 7 days  2. Screening for STD (sexually transmitted disease)  -     Chlamydia/GC amplified DNA by PCR             Subjective:      Patient ID: Chaka Stiles is a 44 y.o. female.    HPI    Chaka Stiles is a 44 y.o.  female who is here today as a new patient for a problem visit . She was last evaluated 24 at [facility] for her annual exam.   Here today with c/o intermittent vaginal odor \"smells like poop\" with penetrative sex. No other symptoms. This started around 24.  Menses q 4-6 weeks  x 4-7 days with varied flow. Menses is acceptable.     Chaka Stiles is sexually active with male partner/fiancee of 8 years. Denies pain, bleeding or dryness.  She is  monogamous.   She uses tubal ligation  for contraception.    She is not interested in STD screening today. Hx of serum HSV + . No known outbreak.   She denies vaginal discharge, itching, pelvic pain.   She has no urinary concerns, does not have incontinence.      The following portions of the patient's history were reviewed and updated as appropriate: allergies, current medications, past family history, past medical history, past social history, past surgical history, and problem list.    Review of Systems      Objective:      /78 (BP Location: Left arm, Patient Position: Sitting, Cuff Size: Standard)   Ht 5' 7.5\" (1.715 m)   Wt 68.5 kg (151 lb)   LMP 2024 (Approximate)   BMI 23.30 kg/m²          Physical Exam  Vitals and nursing note reviewed.   Constitutional:       Appearance: Normal appearance. " She is well-developed.   Genitourinary:     General: Normal vulva.      Exam position: Lithotomy position.      Labia:         Right: No rash, tenderness, lesion or injury.         Left: No rash, tenderness, lesion or injury.       Urethra: No prolapse, urethral pain, urethral swelling or urethral lesion.      Vagina: No signs of injury and foreign body. No erythema, tenderness or bleeding. Vaginal discharge: thin white.     Cervix: No cervical motion tenderness, discharge, friability, lesion, erythema or cervical bleeding.      Uterus: Normal.       Adnexa: Right adnexa normal and left adnexa normal.        Right: No mass, tenderness or fullness.          Left: No mass, tenderness or fullness.        Rectum: No external hemorrhoid.   Lymphadenopathy:      Lower Body: No right inguinal adenopathy. No left inguinal adenopathy.   Skin:     General: Skin is warm and dry.   Neurological:      Mental Status: She is alert and oriented to person, place, and time.   Psychiatric:         Mood and Affect: Mood normal.         Behavior: Behavior normal.

## 2024-06-20 ENCOUNTER — OFFICE VISIT (OUTPATIENT)
Dept: OBGYN CLINIC | Facility: MEDICAL CENTER | Age: 44
End: 2024-06-20
Payer: COMMERCIAL

## 2024-06-20 VITALS
HEIGHT: 68 IN | SYSTOLIC BLOOD PRESSURE: 120 MMHG | BODY MASS INDEX: 22.88 KG/M2 | WEIGHT: 151 LBS | DIASTOLIC BLOOD PRESSURE: 78 MMHG

## 2024-06-20 DIAGNOSIS — N76.0 BV (BACTERIAL VAGINOSIS): Primary | ICD-10-CM

## 2024-06-20 DIAGNOSIS — Z11.3 SCREENING FOR STD (SEXUALLY TRANSMITTED DISEASE): ICD-10-CM

## 2024-06-20 DIAGNOSIS — B96.89 BV (BACTERIAL VAGINOSIS): Primary | ICD-10-CM

## 2024-06-20 LAB
BV WHIFF TEST VAG QL: ABNORMAL
CLUE CELLS SPEC QL WET PREP: ABNORMAL
PH SMN: 4.5 [PH]
SL AMB POCT WET MOUNT: ABNORMAL
T VAGINALIS VAG QL WET PREP: ABNORMAL
YEAST VAG QL WET PREP: ABNORMAL

## 2024-06-20 PROCEDURE — 87210 SMEAR WET MOUNT SALINE/INK: CPT | Performed by: NURSE PRACTITIONER

## 2024-06-20 PROCEDURE — 99213 OFFICE O/P EST LOW 20 MIN: CPT | Performed by: NURSE PRACTITIONER

## 2024-06-20 PROCEDURE — 87591 N.GONORRHOEAE DNA AMP PROB: CPT | Performed by: NURSE PRACTITIONER

## 2024-06-20 PROCEDURE — 87491 CHLMYD TRACH DNA AMP PROBE: CPT | Performed by: NURSE PRACTITIONER

## 2024-06-20 RX ORDER — METRONIDAZOLE 500 MG/1
500 TABLET ORAL EVERY 12 HOURS SCHEDULED
Qty: 14 TABLET | Refills: 0 | Status: SHIPPED | OUTPATIENT
Start: 2024-06-20 | End: 2024-06-27

## 2024-06-20 NOTE — PATIENT INSTRUCTIONS
Bacterial vaginosis noted on wet mount.   Rx sent to requested pharmacy. No sex during treatment. Complete all medication as directed.   Consider daily probiotic.  Healthy diet recommended.   Call with any recurrence of symptoms.

## 2024-06-21 LAB
C TRACH DNA SPEC QL NAA+PROBE: NEGATIVE
N GONORRHOEA DNA SPEC QL NAA+PROBE: NEGATIVE

## 2024-06-27 ENCOUNTER — HOSPITAL ENCOUNTER (OUTPATIENT)
Dept: NON INVASIVE DIAGNOSTICS | Facility: HOSPITAL | Age: 44
Discharge: HOME | End: 2024-06-27
Attending: INTERNAL MEDICINE
Payer: COMMERCIAL

## 2024-06-27 VITALS
SYSTOLIC BLOOD PRESSURE: 120 MMHG | BODY MASS INDEX: 23.7 KG/M2 | DIASTOLIC BLOOD PRESSURE: 78 MMHG | WEIGHT: 151 LBS | HEART RATE: 76 BPM | HEIGHT: 67 IN

## 2024-06-27 DIAGNOSIS — R55 SYNCOPE, UNSPECIFIED SYNCOPE TYPE: ICD-10-CM

## 2024-06-27 LAB
AORTIC ROOT: 3 CM
APICAL FOUR CHAMBER EJECTION FRACTION: 62 %
AV LVOT MEAN GRADIENT: 3 MMHG
AV LVOT PEAK GRADIENT: 5 MMHG
BSA FOR ECHO PROCEDURE: 1.79 M2
DOP CALC LVOT PEAK VEL VTI: 24 CM
DOP CALC LVOT PEAK VEL: 1.07 M/S
E WAVE DECELERATION TIME: 341 MS
E/A RATIO: 1.43
FRACTIONAL SHORTENING: 33 (ref 28–44)
INTERVENTRICULAR SEPTUM IN DIASTOLE (PARASTERNAL SHORT AXIS VIEW): 0.7 CM
INTERVENTRICULAR SEPTUM: 0.7 CM (ref 0.6–1.1)
LAAS-AP2: 15.8 CM2
LAAS-AP4: 12.1 CM2
LEFT ATRIUM SIZE: 3 CM
LEFT ATRIUM VOLUME (MOD BIPLANE): 34 ML
LEFT ATRIUM VOLUME INDEX (MOD BIPLANE): 18.9 ML/M2
LEFT INTERNAL DIMENSION IN SYSTOLE: 2.8 CM (ref 2.1–4)
LEFT VENTRICULAR INTERNAL DIMENSION IN DIASTOLE: 4.2 CM (ref 3.5–6)
LEFT VENTRICULAR POSTERIOR WALL IN END DIASTOLE: 0.8 CM
LEFT VENTRICULAR STROKE VOLUME: 46 ML
LVSV (TEICH): 46 ML
MV E'TISSUE VEL-LAT: 16 CM/S
MV E'TISSUE VEL-SEP: 9 CM/S
MV PEAK A VEL: 0.61 M/S
MV PEAK E VEL: 87 CM/S
MV STENOSIS PRESSURE HALF TIME: 99 MS
MV VALVE AREA P 1/2 METHOD: 2.22
RA PRESSURE ESTIMATED: 15 MMHG
RIGHT ATRIUM AREA SYSTOLE A4C: 15.9 CM2
RIGHT VENTRICLE ID DIMENSION: 3.4 CM
RV PSP: 32 MMHG
SL CV LEFT ATRIUM LENGTH A2C: 5 CM
SL CV LV EF: 65
SL CV PED ECHO LEFT VENTRICLE DIASTOLIC VOLUME (MOD BIPLANE) 2D: 77 ML
SL CV PED ECHO LEFT VENTRICLE SYSTOLIC VOLUME (MOD BIPLANE) 2D: 31 ML
TR MAX PG: 17 MMHG
TR PEAK VELOCITY: 2.1 M/S
TRICUSPID ANNULAR PLANE SYSTOLIC EXCURSION: 2.4 CM
TRICUSPID VALVE PEAK REGURGITATION VELOCITY: 2.07 M/S

## 2024-06-27 PROCEDURE — 93306 TTE W/DOPPLER COMPLETE: CPT

## 2024-06-27 PROCEDURE — 93306 TTE W/DOPPLER COMPLETE: CPT | Performed by: INTERNAL MEDICINE

## 2024-07-08 DIAGNOSIS — J30.89 NON-SEASONAL ALLERGIC RHINITIS, UNSPECIFIED TRIGGER: ICD-10-CM

## 2024-07-08 RX ORDER — FLUTICASONE PROPIONATE 50 MCG
SPRAY, SUSPENSION (ML) NASAL
Qty: 16 G | Refills: 0 | Status: SHIPPED | OUTPATIENT
Start: 2024-07-08

## 2024-07-25 ENCOUNTER — OFFICE VISIT (OUTPATIENT)
Dept: OBGYN CLINIC | Facility: CLINIC | Age: 44
End: 2024-07-25
Payer: COMMERCIAL

## 2024-07-25 ENCOUNTER — APPOINTMENT (OUTPATIENT)
Dept: RADIOLOGY | Facility: MEDICAL CENTER | Age: 44
End: 2024-07-25
Payer: COMMERCIAL

## 2024-07-25 ENCOUNTER — CONSULT (OUTPATIENT)
Dept: CARDIOLOGY CLINIC | Facility: CLINIC | Age: 44
End: 2024-07-25
Payer: COMMERCIAL

## 2024-07-25 VITALS
BODY MASS INDEX: 22.51 KG/M2 | HEART RATE: 72 BPM | WEIGHT: 152 LBS | DIASTOLIC BLOOD PRESSURE: 72 MMHG | SYSTOLIC BLOOD PRESSURE: 118 MMHG | HEIGHT: 69 IN

## 2024-07-25 VITALS
HEART RATE: 76 BPM | BODY MASS INDEX: 24.64 KG/M2 | TEMPERATURE: 98 F | DIASTOLIC BLOOD PRESSURE: 72 MMHG | HEIGHT: 67 IN | WEIGHT: 157 LBS | SYSTOLIC BLOOD PRESSURE: 115 MMHG | OXYGEN SATURATION: 97 %

## 2024-07-25 DIAGNOSIS — R55 SYNCOPE, UNSPECIFIED SYNCOPE TYPE: ICD-10-CM

## 2024-07-25 DIAGNOSIS — Z98.890 S/P ORIF (OPEN REDUCTION INTERNAL FIXATION) FRACTURE: Primary | ICD-10-CM

## 2024-07-25 DIAGNOSIS — Z98.890 S/P ORIF (OPEN REDUCTION INTERNAL FIXATION) FRACTURE: ICD-10-CM

## 2024-07-25 DIAGNOSIS — Z87.81 S/P ORIF (OPEN REDUCTION INTERNAL FIXATION) FRACTURE: ICD-10-CM

## 2024-07-25 DIAGNOSIS — Z87.81 S/P ORIF (OPEN REDUCTION INTERNAL FIXATION) FRACTURE: Primary | ICD-10-CM

## 2024-07-25 PROCEDURE — 99213 OFFICE O/P EST LOW 20 MIN: CPT | Performed by: ORTHOPAEDIC SURGERY

## 2024-07-25 PROCEDURE — 99214 OFFICE O/P EST MOD 30 MIN: CPT | Performed by: PHYSICIAN ASSISTANT

## 2024-07-25 PROCEDURE — 73610 X-RAY EXAM OF ANKLE: CPT

## 2024-07-25 NOTE — ASSESSMENT & PLAN NOTE
"1 episode of \"fainting\" where she felt dizzy and lightheaded and did not \"feel good\" and brought herself to the ground without any injury  She came to in a few seconds and had recurrence of the same.  She experienced some shaking of the upper body and arms for approximately 10 seconds.  Patient likely had vagal syncope.  She previously only had an apple and possibly a yogurt that morning.  She went to the emergency room and magnesium was low.  Additional workup was negative including an EKG.  Patient underwent two-dimensional echocardiogram that showed preserved LV function without wall motion abnormality or structural abnormality.    Live ZIO monitor will be obtained for 2 weeks to ensure that there is no concerning arrhythmia as a cause  "

## 2024-07-25 NOTE — PROGRESS NOTES
"[facility]   Outpatient Note  Chaka Stiles  1980  [id number]  [facility] [address] [id number] [phone]    Chaka Stiles is a 44 y.o. female    Assessment and Plan:   Syncope  1 episode of \"fainting\" where she felt dizzy and lightheaded and did not \"feel good\" and brought herself to the ground without any injury  She came to in a few seconds and had recurrence of the same.  She experienced some shaking of the upper body and arms for approximately 10 seconds.  Patient likely had vagal syncope.  She previously only had an apple and possibly a yogurt that morning.  She went to the emergency room and magnesium was low.  Additional workup was negative including an EKG.  Patient underwent two-dimensional echocardiogram that showed preserved LV function without wall motion abnormality or structural abnormality.    Live ZIO monitor will be obtained for 2 weeks to ensure that there is no concerning arrhythmia as a cause      Additional Plan:   No changes in medication today    Pertinent testing orders as outlined.      Available lab and test results are reviewed with the patient and any additional required labs are ordered as noted.     Return visit will be in one month or earlier if there are problems.     The patient is encouraged to call in the meantime if there are questions or concerns.      Subjective:   The patient is seen in the office today as a new patient regarding a recent syncopal episode.  She had 1 episode of \"fainting where she felt dizzy and lightheaded and did not \"feel good\".  She lowered herself to the ground she came to in a few seconds and had recurrence of the same.  She experienced some shaking of the upper body and arms for approximately 10 seconds.  Patient likely had vagal syncope.  She previously only had an apple and possibly a yogurt that morning.  She went to the emergency " room and magnesium was low.  Additional workup was negative including an EKG.  Patient underwent two-dimensional echocardiogram that showed preserved LV function without wall motion abnormality or structural abnormality.    She is concerned because she has significant family history of coronary artery disease.  Maternal grandfather had MI in his 50s and passed away.  Maternal grandmother also had coronary artery disease and had MI in her 70s she is still living in her 90s.  Father also had coronary artery disease but she is not clear up about his history as she is estranged from her father.    Patient has prescription for medical marijuana due to anxiety and consumes this on a daily basis in the morning.  She has alcohol 2-3 times weekly and smokes approximately 5 cigarettes a day for the past 8 years.  She quit on June 8.    She has a pretty healthy diet and consumes mostly fruits and vegetables but does consume very low calorie count on a daily basis.  She does not consume sugar or carbs.        Social History  Social History     Tobacco Use   Smoking Status Some Days    Current packs/day: 0.50    Types: Cigarettes   Smokeless Tobacco Never   ,   Social History     Substance and Sexual Activity   Alcohol Use Yes    Comment: socially   ,   Social History     Substance and Sexual Activity   Drug Use Yes    Types: Marijuana    Comment: medical marijuana     Family History   Problem Relation Age of Onset    No Known Problems Mother     Cancer Father         type unknown    Heart disease Father     Heart attack Father     Stroke Father     Other Father         transplant kidney  or  liver    Drug abuse Sister     No Known Problems Daughter     Heart murmur Son     Colon cancer Maternal Grandmother     Heart disease Maternal Grandfather     Heart attack Maternal Grandfather     Dementia Paternal Grandmother     Lung disease Paternal Grandfather     No Known Problems Paternal Aunt     No Known Problems Paternal Aunt      BRCA2 Positive Neg Hx     BRCA1 Negative Neg Hx     Ovarian cancer Neg Hx     Endometrial cancer Neg Hx     BRCA 1/2 Neg Hx     Breast cancer Neg Hx        Medical and Surgical History  Past Medical History:   Diagnosis Date    Abnormal Pap smear of cervix     GERD (gastroesophageal reflux disease)     Herpes     Psychiatric disorder     depression, anxiety    Recurrent urinary tract infection 03/22/2018     Past Surgical History:   Procedure Laterality Date    KNEE SURGERY      ORIF TIBIA & FIBULA FRACTURES Right 1/24/2024    Procedure: OPEN REDUCTION W/ INTERNAL FIXATION (ORIF) ANKLE;  Surgeon: Jason Dawson DO;  Location: [facility];  Service: Orthopedics    TUBAL LIGATION           Current Outpatient Medications:     calcium carbonate (OS-BEKA) 600 MG tablet, Take 600 mg by mouth daily, Disp: , Rfl:     cetirizine (ZyrTEC) 10 mg tablet, Take 10 mg by mouth daily, Disp: , Rfl:     diphenhydrAMINE (BENADRYL) 25 mg capsule, Take 25 mg by mouth every 6 (six) hours as needed for allergies, Disp: , Rfl:     fluticasone (FLONASE) 50 mcg/act nasal spray, Use 1 spray(s) in each nostril once daily, Disp: 16 g, Rfl: 0    Magnesium 250 MG TABS, Take 250 mg by mouth daily, Disp: , Rfl:     multivitamin (THERAGRAN) TABS, Take 1 tablet by mouth daily, Disp: , Rfl:     NON FORMULARY, Medical Marijuana, Disp: , Rfl:     ondansetron (ZOFRAN-ODT) 4 mg disintegrating tablet, Take 1 tablet (4 mg total) by mouth every 6 (six) hours as needed for nausea or vomiting, Disp: 15 tablet, Rfl: 0    SF 5000 Plus 1.1 % CREA, BRUSH TEETH ONCE DAILY, Disp: , Rfl:     aspirin 81 mg chewable tablet, Chew 1 tablet (81 mg total) 2 (two) times a day for 14 days, Disp: 28 tablet, Rfl: 0    meloxicam (Mobic) 15 mg tablet, Take 1 tablet (15 mg total) by mouth daily (Patient not taking: Reported on 7/25/2024), Disp: 30 tablet, Rfl: 2  No Known Allergies    Review of Systems   Constitutional: Negative.   HENT: Negative.     Eyes: Negative.   "  Cardiovascular:  Positive for syncope. Negative for chest pain, claudication, cyanosis, dyspnea on exertion, irregular heartbeat, leg swelling, near-syncope, orthopnea, palpitations and paroxysmal nocturnal dyspnea.   Respiratory: Negative.  Negative for cough, hemoptysis, shortness of breath, sleep disturbances due to breathing, snoring, sputum production and wheezing.    Endocrine: Negative.    Hematologic/Lymphatic: Negative.    Skin: Negative.    Musculoskeletal: Negative.    Gastrointestinal: Negative.    Genitourinary: Negative.    Psychiatric/Behavioral: Negative.     Allergic/Immunologic: Negative.        Objective:   /72   Pulse 72   Ht 5' 8.5\" (1.74 m)   Wt 68.9 kg (152 lb)   BMI 22.78 kg/m²   Physical Exam  Vitals and nursing note reviewed.   Constitutional:       Appearance: She is well-developed.   HENT:      Head: Normocephalic and atraumatic.      Mouth/Throat:      Mouth: Mucous membranes are moist.   Eyes:      General: No scleral icterus.     Conjunctiva/sclera: Conjunctivae normal.   Neck:      Vascular: No JVD.      Trachea: No tracheal deviation.   Cardiovascular:      Rate and Rhythm: Normal rate and regular rhythm.      Pulses: Intact distal pulses.      Heart sounds: Normal heart sounds, S1 normal and S2 normal. No murmur heard.     No friction rub. No gallop.   Pulmonary:      Effort: Pulmonary effort is normal. No respiratory distress.      Breath sounds: Normal breath sounds. No wheezing or rales.   Chest:      Chest wall: No tenderness.   Abdominal:      General: Bowel sounds are normal. There is no distension.      Palpations: Abdomen is soft.      Tenderness: There is no abdominal tenderness.      Comments: Aorta not palpable    Musculoskeletal:         General: No tenderness. Normal range of motion.      Cervical back: Normal range of motion and neck supple.      Right lower leg: No edema.      Left lower leg: No edema.   Skin:     General: Skin is warm and dry.      " "Coloration: Skin is not pale.      Findings: No erythema or rash.   Neurological:      General: No focal deficit present.      Mental Status: She is alert and oriented to person, place, and time.   Psychiatric:         Mood and Affect: Mood normal.         Behavior: Behavior normal.         Judgment: Judgment normal.         Lab Review:   No results found for: \"CHOL\"  No results found for: \"HDL\"  No results found for: \"LDLCALC\"  No results found for: \"TRIG\"  Results Reviewed       None          Results Reviewed       None          Results Reviewed       None            Recent Cardiovascular Testing:   Echo 6/27/2024 normal LVEF 65% no WMA    ECG Review:   5/24/2024 normal sinus rhythm          "

## 2024-07-25 NOTE — PROGRESS NOTES
Assessment/Plan:   Diagnoses and all orders for this visit:    S/P ORIF (open reduction internal fixation) fracture  -     XR ankle 3+ vw right; Future    Reviewed today's physical exam findings and x-ray findings with patient at time of visit. X-rays demonstrate well-healed distal fibular fracture with surgical hardware to be appropriately in place and intact. She demonstrates good strength, good joint stability, and good range of motion on physical exam, and is therefore considered clinically resolved at this time. She is cleared to continue activities as tolerated without limitations or restrictions. At this time there is no need for scheduled follow-up. Should any questions or concerns arise in the future she can contact the office. Patient expresses understanding and is in agreement with this treatment plan.    The fracture is completely healed.  There is no pain.  There is full strength full motion.  Continue home exercise program.  Follow-up on an as-needed basis.  If her condition changes, she would not hesitate to let us know    Subjective:   Patient ID: Chaka Stiles  1980     HPI  Patient is a 44 y.o. female who presents for follow-up evaluation 6 months s/p ORIF right distal fibular fracture performed 1/24/2024. Patient states that she has no pain at time of visit today. She has been able to return to all ADLs, including strenuous workouts without symptom exacerbation. He denies any new onset bruising, swelling, numbness, tingling, feelings of instability, or mechanical symptoms. She is not currently taking any medications for pain.    The following portions of the patient's history were reviewed and updated as appropriate:  Past medical history, past surgical history, Family history, social history, current medications and allergies    Past Medical History:   Diagnosis Date    Abnormal Pap smear of cervix     GERD (gastroesophageal reflux disease)     Herpes     Psychiatric disorder      depression, anxiety    Recurrent urinary tract infection 03/22/2018       Past Surgical History:   Procedure Laterality Date    KNEE SURGERY      ORIF TIBIA & FIBULA FRACTURES Right 1/24/2024    Procedure: OPEN REDUCTION W/ INTERNAL FIXATION (ORIF) ANKLE;  Surgeon: Jason Dawson DO;  Location: [facility];  Service: Orthopedics    TUBAL LIGATION         Family History   Problem Relation Age of Onset    No Known Problems Mother     Cancer Father         type unknown    Heart disease Father     Heart attack Father     Stroke Father     Other Father         transplant kidney  or  liver    Drug abuse Sister     No Known Problems Daughter     Heart murmur Son     Colon cancer Maternal Grandmother     Heart disease Maternal Grandfather     Heart attack Maternal Grandfather     Dementia Paternal Grandmother     Lung disease Paternal Grandfather     No Known Problems Paternal Aunt     No Known Problems Paternal Aunt     BRCA2 Positive Neg Hx     BRCA1 Negative Neg Hx     Ovarian cancer Neg Hx     Endometrial cancer Neg Hx     BRCA 1/2 Neg Hx     Breast cancer Neg Hx        Social History     Socioeconomic History    Marital status: Single     Spouse name: None    Number of children: None    Years of education: None    Highest education level: None   Occupational History    None   Tobacco Use    Smoking status: Some Days     Current packs/day: 0.50     Types: Cigarettes    Smokeless tobacco: Never   Vaping Use    Vaping status: Never Used   Substance and Sexual Activity    Alcohol use: Yes     Comment: socially    Drug use: Yes     Types: Marijuana     Comment: medical marijuana    Sexual activity: Yes     Partners: Male     Birth control/protection: Female Sterilization   Other Topics Concern    None   Social History Narrative    None     Social Determinants of Health     Financial Resource Strain: Not on file   Food Insecurity: Not on file   Transportation Needs: Not on file   Physical Activity: Not on file   Stress:  "Not on file   Social Connections: Unknown (6/18/2024)    Received from [employer]     How often do you feel lonely or isolated from those around you? (Adult - for ages 18 years and over): Not on file   Intimate Partner Violence: Not on file   Housing Stability: Not on file         Current Outpatient Medications:     calcium carbonate (OS-BEKA) 600 MG tablet, Take 600 mg by mouth daily, Disp: , Rfl:     cetirizine (ZyrTEC) 10 mg tablet, Take 10 mg by mouth daily, Disp: , Rfl:     diphenhydrAMINE (BENADRYL) 25 mg capsule, Take 25 mg by mouth every 6 (six) hours as needed for allergies, Disp: , Rfl:     fluticasone (FLONASE) 50 mcg/act nasal spray, Use 1 spray(s) in each nostril once daily, Disp: 16 g, Rfl: 0    Magnesium 250 MG TABS, Take 250 mg by mouth daily, Disp: , Rfl:     meloxicam (Mobic) 15 mg tablet, Take 1 tablet (15 mg total) by mouth daily, Disp: 30 tablet, Rfl: 2    multivitamin (THERAGRAN) TABS, Take 1 tablet by mouth daily, Disp: , Rfl:     NON FORMULARY, Medical Marijuana, Disp: , Rfl:     ondansetron (ZOFRAN-ODT) 4 mg disintegrating tablet, Take 1 tablet (4 mg total) by mouth every 6 (six) hours as needed for nausea or vomiting, Disp: 15 tablet, Rfl: 0    SF 5000 Plus 1.1 % CREA, BRUSH TEETH ONCE DAILY, Disp: , Rfl:     aspirin 81 mg chewable tablet, Chew 1 tablet (81 mg total) 2 (two) times a day for 14 days, Disp: 28 tablet, Rfl: 0    No Known Allergies    Review of Systems   Constitutional:  Negative for chills, fatigue and fever.   Gastrointestinal:  Negative for nausea.   Musculoskeletal:         As noted in HPI   Neurological:  Negative for dizziness, numbness and headaches.   All other systems reviewed and are negative.       Objective:  /72 (BP Location: Left arm, Patient Position: Sitting, Cuff Size: Standard)   Pulse 76   Temp 98 °F (36.7 °C) (Temporal)   Ht 5' 7\" (1.702 m)   Wt 71.2 kg (157 lb)   SpO2 97%   BMI 24.59 kg/m²     Ortho Exam  Right ankle - "   Weight-bearing status: Full WBAT without use of assistive device  Incision(s): Well-healed  Skin is warm and dry with no signs of erythema, ecchymosis, or infection  No soft tissue swelling or effusion  ROM: DF 0-25, PF 0-35, inv 0-25, ev 0-15  Strength: 5/5 throughout  Joint is stable to anterior and posterior stress  -Medial talar tilt  -Lateral talar tilt  -Syndesmotic squeeze  Calf compartments are soft  - Leigh Ann's sign  2+ TP and DP pulses with brisk capillary refill to the toes  Sural, saphenous, tibial, superficial and deep peroneal motor and sensory distributions intact  Sensation light touch intact distally    Physical Exam  Vitals and nursing note reviewed.   Constitutional:       General: She is not in acute distress.     Appearance: She is well-developed.   HENT:      Head: Normocephalic and atraumatic.   Eyes:      Conjunctiva/sclera: Conjunctivae normal.   Cardiovascular:      Rate and Rhythm: Normal rate.   Pulmonary:      Effort: Pulmonary effort is normal.   Musculoskeletal:      Cervical back: Neck supple.   Skin:     General: Skin is warm and dry.      Capillary Refill: Capillary refill takes less than 2 seconds.   Neurological:      Mental Status: She is alert and oriented to person, place, and time.   Psychiatric:         Mood and Affect: Mood normal.         Behavior: Behavior normal.          Diagnostic Test Review:  No imaging performed this visit    Procedures   No procedures performed this visit    Scribe Attestation      I,:  Tay Yancey am acting as a scribe while in the presence of the attending physician.:       I,:  Jason Dawson DO personally performed the services described in this documentation    as scribed in my presence.:

## 2024-08-22 ENCOUNTER — CLINICAL SUPPORT (OUTPATIENT)
Dept: CARDIOLOGY CLINIC | Facility: CLINIC | Age: 44
End: 2024-08-22

## 2024-08-22 DIAGNOSIS — R55 SYNCOPE, UNSPECIFIED SYNCOPE TYPE: ICD-10-CM

## 2024-08-27 NOTE — RESULT ENCOUNTER NOTE
This extended monitor recording was for 12 days.    The basic mechanism was sinus with rates ranging from 48 - 163 beats per minute while in sinus rhythm.  The average heart rate was 83 beats per minute.  Atrial ectopy was common including several short runs.  The longest was only 14 beats.  Ventricular ectopy was rare  and there were no runs.  No pauses were present.  Triggered recordings were associated with relative sinus tachycardia but did not correlate with atrial high heart rate runs.      Hany Parrish MD

## 2024-08-29 ENCOUNTER — TELEPHONE (OUTPATIENT)
Age: 44
End: 2024-08-29

## 2024-08-29 ENCOUNTER — OFFICE VISIT (OUTPATIENT)
Dept: CARDIOLOGY CLINIC | Facility: CLINIC | Age: 44
End: 2024-08-29
Payer: COMMERCIAL

## 2024-08-29 VITALS
RESPIRATION RATE: 16 BRPM | BODY MASS INDEX: 22.66 KG/M2 | HEART RATE: 64 BPM | OXYGEN SATURATION: 99 % | SYSTOLIC BLOOD PRESSURE: 134 MMHG | HEIGHT: 69 IN | DIASTOLIC BLOOD PRESSURE: 76 MMHG | WEIGHT: 153 LBS

## 2024-08-29 DIAGNOSIS — R55 VASOVAGAL SYNCOPE: Primary | ICD-10-CM

## 2024-08-29 DIAGNOSIS — R55 SYNCOPE, UNSPECIFIED SYNCOPE TYPE: Primary | ICD-10-CM

## 2024-08-29 PROCEDURE — 99213 OFFICE O/P EST LOW 20 MIN: CPT | Performed by: PHYSICIAN ASSISTANT

## 2024-08-29 NOTE — TELEPHONE ENCOUNTER
Labs ordered are what I wanted She does have to fast  Cardiology note feels episode she had was vagal - common cause of syncope due to position or sensory overload to the body and unable to adapt quick enough to change  We can place order for Neurology but it seemed from Cardio note that no recurrent episodes and their testing was wnl

## 2024-08-29 NOTE — ASSESSMENT & PLAN NOTE
"1 episode of \"fainting\" where she felt dizzy and lightheaded and did not \"feel good\" and brought herself to the ground without any injury  She came to in a few seconds and had recurrence of the same.  She experienced some shaking of the upper body and arms for approximately 10 seconds.  Patient likely had vagal syncope.  She previously only had an apple and possibly a yogurt that morning.  She went to the emergency room and magnesium was low.  Additional workup was negative including an EKG.  Patient underwent two-dimensional echocardiogram that showed preserved LV function without wall motion abnormality or structural abnormality.    ZIO revealed Atrial tachycardia runs the longest being 14 beats   This is asymptomatic   She opted not to be treated for this.     Echo showed LV is normal with EF of 65% and no WMA     No recurrence of syncope     At this time we will see her on an as needed basis   If syncope recurs we will obtain a tilt table test.       "

## 2024-08-29 NOTE — TELEPHONE ENCOUNTER
Girish returned call to pt.  Pt states she saw cardiology today and they had done a Holter Monitor.  States based on the monitor results, pt is to just follow up as needed with Cardiology.  Pt inquiring if PCP has another course of action as she states that she recalls PCP mentioning seeing Neuro if there is no Cardiac cause found.    Pt also states that PCP had mentioned wanting to do labs to compare to labs done during Hospital encounter in May.  Current lab orders are from March and include:  CBC, CMP, Lipid, and Vitamin D.  Did provider want to add additional labs?  Please advise and if pt should be fasting.

## 2024-08-29 NOTE — PROGRESS NOTES
"[facility]   Outpatient Note  Chaka Stiles  1980  [id number]  [facility] [address] [id number] [phone]    Chaka Stiles is a 44 y.o. female    Assessment and Plan:   Syncope  1 episode of \"fainting\" where she felt dizzy and lightheaded and did not \"feel good\" and brought herself to the ground without any injury  She came to in a few seconds and had recurrence of the same.  She experienced some shaking of the upper body and arms for approximately 10 seconds.  Patient likely had vagal syncope.  She previously only had an apple and possibly a yogurt that morning.  She went to the emergency room and magnesium was low.  Additional workup was negative including an EKG.  Patient underwent two-dimensional echocardiogram that showed preserved LV function without wall motion abnormality or structural abnormality.    ZIO revealed Atrial tachycardia runs the longest being 14 beats   This is asymptomatic     Echo showed LV is normal with EF of 65% and no WMA     No recurrence of syncope           Additional Plan:   No Medication changes made or testing ordered today.     Available lab results are reviewed with the patient as noted.     We will see the patient on an as needed basis only, but would happy to see her at any point in time should it be deemed it necessary in the future.     The patient is encouraged to call if there are questions or concerns.       Subjective:   The patient is seen in the office today for a follow up regarding a syncopal episode.  She had 1 episode of \"fainting where she felt dizzy and lightheaded and did not \"feel good\".  She lowered herself to the ground she came to in a few seconds and had recurrence of the same.  She experienced some shaking of the upper body and arms for approximately 10 seconds.  Patient likely had vagal syncope.  She previously only had an apple and " possibly a yogurt that morning.  She went to the emergency room and magnesium was low.  Additional workup was negative including an EKG.  Patient underwent two-dimensional echocardiogram that showed preserved LV function without wall motion abnormality or structural abnormality.    She is concerned because she has significant family history of coronary artery disease.  Maternal grandfather had MI in his 50s and passed away.  Maternal grandmother also had coronary artery disease and had MI in her 70s she is still living in her 90s.  Father also had coronary artery disease but she is not clear up about his history as she is estranged from her father.    Since her last visit she spoke to her father and found out he had a liver transplant and has and is positive for Antigen 9.     Patient has prescription for medical marijuana due to anxiety and consumes this on a daily basis in the morning.  She has alcohol 2-3 times weekly and smokes approximately 5 cigarettes a day for the past 8 years.  She quit on June 8.    She has a pretty healthy diet and consumes mostly fruits and vegetables but does consume very low calorie count on a daily basis.  She does not consume sugar or carbs.    Zio monitor revealed runs of atrial tachycardia the longest being 14 beats and asymptomatic.      She has had no recurrence of syncope         Social History  Social History     Tobacco Use   Smoking Status Some Days    Current packs/day: 0.25    Average packs/day: 0.3 packs/day for 0.2 years (0.1 ttl pk-yrs)    Types: Cigarettes    Start date: 6/1/2024   Smokeless Tobacco Never   ,   Social History     Substance and Sexual Activity   Alcohol Use Yes    Alcohol/week: 3.0 standard drinks of alcohol    Types: 3 Standard drinks or equivalent per week    Comment: socially   ,   Social History     Substance and Sexual Activity   Drug Use Yes    Types: Marijuana    Comment: medical marijuana - daily     Family History   Problem Relation Age of Onset     No Known Problems Mother     Cancer Father         type unknown    Heart disease Father     Heart attack Father     Stroke Father     Other Father         transplant kidney  or  liver    Drug abuse Sister     No Known Problems Daughter     Heart murmur Son     Colon cancer Maternal Grandmother     Heart disease Maternal Grandfather     Heart attack Maternal Grandfather     Dementia Paternal Grandmother     Lung disease Paternal Grandfather     No Known Problems Paternal Aunt     No Known Problems Paternal Aunt     BRCA2 Positive Neg Hx     BRCA1 Negative Neg Hx     Ovarian cancer Neg Hx     Endometrial cancer Neg Hx     BRCA 1/2 Neg Hx     Breast cancer Neg Hx        Medical and Surgical History  Past Medical History:   Diagnosis Date    Abnormal Pap smear of cervix     GERD (gastroesophageal reflux disease)     Herpes     Psychiatric disorder     depression, anxiety    Recurrent urinary tract infection 03/22/2018     Past Surgical History:   Procedure Laterality Date    KNEE SURGERY      ORIF TIBIA & FIBULA FRACTURES Right 1/24/2024    Procedure: OPEN REDUCTION W/ INTERNAL FIXATION (ORIF) ANKLE;  Surgeon: Jason Dawson DO;  Location: [facility] OR;  Service: Orthopedics    TUBAL LIGATION           Current Outpatient Medications:     aspirin 81 mg chewable tablet, Chew 1 tablet (81 mg total) 2 (two) times a day for 14 days, Disp: 28 tablet, Rfl: 0    calcium carbonate (OS-BEKA) 600 MG tablet, Take 600 mg by mouth daily, Disp: , Rfl:     cetirizine (ZyrTEC) 10 mg tablet, Take 10 mg by mouth daily, Disp: , Rfl:     diphenhydrAMINE (BENADRYL) 25 mg capsule, Take 25 mg by mouth every 6 (six) hours as needed for allergies, Disp: , Rfl:     fluticasone (FLONASE) 50 mcg/act nasal spray, Use 1 spray(s) in each nostril once daily, Disp: 16 g, Rfl: 0    Magnesium 250 MG TABS, Take 250 mg by mouth daily, Disp: , Rfl:     multivitamin (THERAGRAN) TABS, Take 1 tablet by mouth daily, Disp: , Rfl:     NON FORMULARY, Medical  "Marijuana, Disp: , Rfl:     ondansetron (ZOFRAN-ODT) 4 mg disintegrating tablet, Take 1 tablet (4 mg total) by mouth every 6 (six) hours as needed for nausea or vomiting, Disp: 15 tablet, Rfl: 0    SF 5000 Plus 1.1 % CREA, BRUSH TEETH ONCE DAILY, Disp: , Rfl:     meloxicam (Mobic) 15 mg tablet, Take 1 tablet (15 mg total) by mouth daily (Patient not taking: Reported on 7/25/2024), Disp: 30 tablet, Rfl: 2  No Known Allergies    Review of Systems   Constitutional: Negative.   HENT: Negative.     Eyes: Negative.    Cardiovascular:  Positive for syncope. Negative for chest pain, claudication, cyanosis, dyspnea on exertion, irregular heartbeat, leg swelling, near-syncope, orthopnea, palpitations and paroxysmal nocturnal dyspnea.   Respiratory: Negative.  Negative for cough, hemoptysis, shortness of breath, sleep disturbances due to breathing, snoring, sputum production and wheezing.    Endocrine: Negative.    Hematologic/Lymphatic: Negative.    Skin: Negative.    Musculoskeletal: Negative.    Gastrointestinal: Negative.    Genitourinary: Negative.    Psychiatric/Behavioral: Negative.     Allergic/Immunologic: Negative.        Objective:   /76 (BP Location: Left arm, Patient Position: Sitting, Cuff Size: Standard)   Pulse 64   Resp 16   Ht 5' 9\" (1.753 m)   Wt 69.4 kg (153 lb)   SpO2 99%   BMI 22.59 kg/m²   Physical Exam  Vitals and nursing note reviewed.   Constitutional:       Appearance: She is well-developed.   HENT:      Head: Normocephalic and atraumatic.      Mouth/Throat:      Mouth: Mucous membranes are moist.   Eyes:      General: No scleral icterus.     Conjunctiva/sclera: Conjunctivae normal.   Neck:      Vascular: No JVD.      Trachea: No tracheal deviation.   Cardiovascular:      Rate and Rhythm: Normal rate and regular rhythm.      Pulses: Intact distal pulses.      Heart sounds: Normal heart sounds, S1 normal and S2 normal. No murmur heard.     No friction rub. No gallop.   Pulmonary:      " "Effort: Pulmonary effort is normal. No respiratory distress.      Breath sounds: Normal breath sounds. No wheezing or rales.   Chest:      Chest wall: No tenderness.   Abdominal:      General: Bowel sounds are normal. There is no distension.      Palpations: Abdomen is soft.      Tenderness: There is no abdominal tenderness.      Comments: Aorta not palpable    Musculoskeletal:         General: No tenderness. Normal range of motion.      Cervical back: Normal range of motion and neck supple.      Right lower leg: No edema.      Left lower leg: No edema.   Skin:     General: Skin is warm and dry.      Coloration: Skin is not pale.      Findings: No erythema or rash.   Neurological:      General: No focal deficit present.      Mental Status: She is alert and oriented to person, place, and time.   Psychiatric:         Mood and Affect: Mood normal.         Behavior: Behavior normal.         Judgment: Judgment normal.         Lab Review:   No results found for: \"CHOL\"  No results found for: \"HDL\"  No results found for: \"LDLCALC\"  No results found for: \"TRIG\"  Results Reviewed       None          Results Reviewed       None          Results Reviewed       None            Recent Cardiovascular Testing:   ValnevaO monitor 8/22/2024: Sinus rhythm ranging from 48 to 163 bpm average 83 atrial ectopy common with short runs longest being 14 beats ventricular ectopy rare triggered recordings relate to sinus tachycardia    Echo 6/27/2024 normal LVEF 65% no WMA    ECG Review:   5/24/2024 normal sinus rhythm          "

## 2024-09-21 DIAGNOSIS — J30.89 NON-SEASONAL ALLERGIC RHINITIS, UNSPECIFIED TRIGGER: ICD-10-CM

## 2024-09-23 RX ORDER — FLUTICASONE PROPIONATE 50 MCG
SPRAY, SUSPENSION (ML) NASAL
Qty: 16 G | Refills: 2 | Status: SHIPPED | OUTPATIENT
Start: 2024-09-23

## 2024-09-26 ENCOUNTER — OFFICE VISIT (OUTPATIENT)
Dept: DENTISTRY | Facility: CLINIC | Age: 44
End: 2024-09-26
Payer: COMMERCIAL

## 2024-09-26 DIAGNOSIS — K02.9 TOOTH DECAY: Primary | ICD-10-CM

## 2024-09-26 PROCEDURE — D0150 COMPREHENSIVE ORAL EVALUATION - NEW OR ESTABLISHED PATIENT: HCPCS | Performed by: STUDENT IN AN ORGANIZED HEALTH CARE EDUCATION/TRAINING PROGRAM

## 2024-09-26 PROCEDURE — D0210 INTRAORAL - COMPLETE SERIES OF RADIOGRAPHIC IMAGES: HCPCS | Performed by: STUDENT IN AN ORGANIZED HEALTH CARE EDUCATION/TRAINING PROGRAM

## 2024-09-26 RX ORDER — SODIUM FLUORIDE 5 MG/ML
PASTE, DENTIFRICE DENTAL
Qty: 51 G | Refills: 20 | Status: SHIPPED | OUTPATIENT
Start: 2024-09-26

## 2024-09-26 NOTE — PROGRESS NOTES
Comprehensive Exam    Chaka Stiles presents for a comprehensive exam. Verbal consent for treatment given in addition to the forms.    Reviewed health history - Patient is ASA    Consents signed: Yes    Perio: Normal  Pain Scale: 0  Caries Assessment: high  Radiographs:FMX    Oral Hygiene instruction reviewed and given.  Hygiene recall visits recommended to the patient.    Treatment Plan:  Periodontal therapy: prophy  Caries control:resins, prevident, try to keep coffee during breakfast time only, and not sip on it.  Occlusal evaluation    Pt has hx of erosion, advised to get PCP to look for signs of GERD.    Prognosis is Good.  Referrals needed: No  Next visit: josh (upper right)

## 2024-10-10 ENCOUNTER — HOSPITAL ENCOUNTER (OUTPATIENT)
Dept: ULTRASOUND IMAGING | Facility: HOSPITAL | Age: 44
Discharge: HOME | End: 2024-10-10
Attending: OBSTETRICS & GYNECOLOGY
Payer: COMMERCIAL

## 2024-10-10 ENCOUNTER — HOSPITAL ENCOUNTER (OUTPATIENT)
Dept: MAMMOGRAPHY | Facility: HOSPITAL | Age: 44
Discharge: HOME | End: 2024-10-10
Attending: OBSTETRICS & GYNECOLOGY
Payer: COMMERCIAL

## 2024-10-10 VITALS — BODY MASS INDEX: 22.66 KG/M2 | WEIGHT: 153 LBS | HEIGHT: 69 IN

## 2024-10-10 DIAGNOSIS — R92.8 ABNORMAL MAMMOGRAM: ICD-10-CM

## 2024-10-10 PROCEDURE — G0279 TOMOSYNTHESIS, MAMMO: HCPCS

## 2024-10-10 PROCEDURE — 76642 ULTRASOUND BREAST LIMITED: CPT

## 2024-10-10 PROCEDURE — 77066 DX MAMMO INCL CAD BI: CPT

## 2024-10-12 NOTE — RESULT ENCOUNTER NOTE
Please order diagnostic breast mammogram and bilateral ultrasound for patient to be done in 6 months

## 2024-10-17 ENCOUNTER — TELEPHONE (OUTPATIENT)
Age: 44
End: 2024-10-17

## 2024-10-17 DIAGNOSIS — Z12.31 ENCOUNTER FOR SCREENING MAMMOGRAM FOR MALIGNANT NEOPLASM OF BREAST: Primary | ICD-10-CM

## 2024-10-17 NOTE — TELEPHONE ENCOUNTER
----- Message from Fernanda Gregory MD sent at 10/12/2024  6:27 PM EDT -----  Please order diagnostic breast mammogram and bilateral ultrasound for patient to be done in 6 months

## 2024-11-19 ENCOUNTER — OFFICE VISIT (OUTPATIENT)
Dept: URGENT CARE | Facility: MEDICAL CENTER | Age: 44
End: 2024-11-19
Payer: COMMERCIAL

## 2024-11-19 VITALS
HEIGHT: 68 IN | BODY MASS INDEX: 23.01 KG/M2 | SYSTOLIC BLOOD PRESSURE: 118 MMHG | HEART RATE: 90 BPM | WEIGHT: 151.8 LBS | RESPIRATION RATE: 20 BRPM | TEMPERATURE: 97.9 F | DIASTOLIC BLOOD PRESSURE: 78 MMHG | OXYGEN SATURATION: 98 %

## 2024-11-19 DIAGNOSIS — J01.10 ACUTE NON-RECURRENT FRONTAL SINUSITIS: Primary | ICD-10-CM

## 2024-11-19 PROCEDURE — S9088 SERVICES PROVIDED IN URGENT: HCPCS

## 2024-11-19 PROCEDURE — 99213 OFFICE O/P EST LOW 20 MIN: CPT

## 2024-11-19 RX ORDER — DOXYCYCLINE 100 MG/1
100 TABLET ORAL 2 TIMES DAILY
Qty: 14 TABLET | Refills: 0 | Status: SHIPPED | OUTPATIENT
Start: 2024-11-19 | End: 2024-11-26

## 2024-11-19 RX ORDER — PREDNISONE 20 MG/1
40 TABLET ORAL DAILY
Qty: 8 TABLET | Refills: 0 | Status: SHIPPED | OUTPATIENT
Start: 2024-11-19 | End: 2024-11-23

## 2024-11-19 NOTE — PROGRESS NOTES
[facility]        NAME: Chaka Stiles is a 44 y.o. female  : 1980    MRN: [id number]  DATE: 2024  TIME: 4:53 PM    Assessment and Plan   Acute non-recurrent frontal sinusitis [J01.10]  1. Acute non-recurrent frontal sinusitis  doxycycline (ADOXA) 100 MG tablet    predniSONE 20 mg tablet            Patient Instructions       Follow up with PCP in 3-5 days.  Proceed to  ER if symptoms worsen.    If tests are performed, our office will contact you with results only if changes need to made to the care plan discussed with you at the visit. You can review your full results on [facility].    Chief Complaint     Chief Complaint   Patient presents with    Cold Like Symptoms     Sinus pain and congestion with ear pain which started 2 weeks ag. States of having blood in her nasal drainage.         History of Present Illness       Sinus pain and congestion with ear pain which started 2 weeks ag. States of having blood in her nasal drainage        Review of Systems   Review of Systems   Constitutional:  Negative for appetite change, chills, fatigue and fever.   HENT:  Positive for congestion, ear pain, postnasal drip, rhinorrhea, sinus pressure and sinus pain. Negative for sore throat.    Respiratory:  Negative for cough, shortness of breath, wheezing and stridor.    Cardiovascular:  Negative for chest pain and palpitations.   Gastrointestinal:  Negative for abdominal pain, constipation, diarrhea, nausea and vomiting.   Musculoskeletal:  Negative for myalgias.   Neurological:  Negative for dizziness, syncope, light-headedness and headaches.         Current Medications       Current Outpatient Medications:     calcium carbonate (OS-BEKA) 600 MG tablet, Take 600 mg by mouth daily, Disp: , Rfl:     cetirizine (ZyrTEC) 10 mg tablet, Take 10 mg by mouth daily, Disp: , Rfl:     diphenhydrAMINE (BENADRYL) 25 mg capsule, Take 25 mg by mouth every 6 (six) hours as needed for allergies, Disp: ,  Rfl:     doxycycline (ADOXA) 100 MG tablet, Take 1 tablet (100 mg total) by mouth 2 (two) times a day for 7 days, Disp: 14 tablet, Rfl: 0    fluticasone (FLONASE) 50 mcg/act nasal spray, Use 1 spray(s) in each nostril once daily, Disp: 16 g, Rfl: 2    Magnesium 250 MG TABS, Take 250 mg by mouth daily, Disp: , Rfl:     multivitamin (THERAGRAN) TABS, Take 1 tablet by mouth daily, Disp: , Rfl:     NON FORMULARY, Medical Marijuana, Disp: , Rfl:     ondansetron (ZOFRAN-ODT) 4 mg disintegrating tablet, Take 1 tablet (4 mg total) by mouth every 6 (six) hours as needed for nausea or vomiting, Disp: 15 tablet, Rfl: 0    predniSONE 20 mg tablet, Take 2 tablets (40 mg total) by mouth daily for 4 days, Disp: 8 tablet, Rfl: 0    Probiotic Product (PROBIOTIC PEARLS WOMENS PO), Take by mouth, Disp: , Rfl:     SODIUM FLUORIDE, DENTAL GEL, 1.1 % CREA, Apply to teeth daily at bedtime brush daily at bedtime, spit out, don't rinse. Floss. Nothing to eat or drink after., Disp: 51 g, Rfl: 20    aspirin 81 mg chewable tablet, Chew 1 tablet (81 mg total) 2 (two) times a day for 14 days, Disp: 28 tablet, Rfl: 0    meloxicam (Mobic) 15 mg tablet, Take 1 tablet (15 mg total) by mouth daily (Patient not taking: Reported on 7/25/2024), Disp: 30 tablet, Rfl: 2    Current Allergies     Allergies as of 11/19/2024    (No Known Allergies)            The following portions of the patient's history were reviewed and updated as appropriate: allergies, current medications, past family history, past medical history, past social history, past surgical history and problem list.     Past Medical History:   Diagnosis Date    Abnormal Pap smear of cervix     GERD (gastroesophageal reflux disease)     Herpes     Psychiatric disorder     depression, anxiety    Recurrent urinary tract infection 03/22/2018       Past Surgical History:   Procedure Laterality Date    KNEE SURGERY      ORIF TIBIA & FIBULA FRACTURES Right 1/24/2024    Procedure: OPEN REDUCTION W/  "INTERNAL FIXATION (ORIF) ANKLE;  Surgeon: Jason Dawson DO;  Location: [facility];  Service: Orthopedics    TUBAL LIGATION         Family History   Problem Relation Age of Onset    No Known Problems Mother     Cancer Father         type unknown    Heart disease Father     Heart attack Father     Stroke Father     Other Father         transplant kidney  or  liver    Drug abuse Sister     No Known Problems Daughter     Heart murmur Son     Colon cancer Maternal Grandmother     Heart disease Maternal Grandfather     Heart attack Maternal Grandfather     Dementia Paternal Grandmother     Lung disease Paternal Grandfather     No Known Problems Paternal Aunt     No Known Problems Paternal Aunt     BRCA2 Positive Neg Hx     BRCA1 Negative Neg Hx     Ovarian cancer Neg Hx     Endometrial cancer Neg Hx     BRCA 1/2 Neg Hx     Breast cancer Neg Hx          Medications have been verified.        Objective   /78   Pulse 90   Temp 97.9 °F (36.6 °C)   Resp 20   Ht 5' 8\" (1.727 m)   Wt 68.9 kg (151 lb 12.8 oz)   LMP 11/16/2024   SpO2 98%   BMI 23.08 kg/m²        Physical Exam     Physical Exam  Vitals and nursing note reviewed.   Constitutional:       General: She is not in acute distress.     Appearance: Normal appearance. She is normal weight. She is not ill-appearing, toxic-appearing or diaphoretic.   HENT:      Head: Normocephalic.      Right Ear: Ear canal and external ear normal. There is no impacted cerumen.      Left Ear: Ear canal and external ear normal. There is no impacted cerumen.      Ears:      Comments: Bilateral TM effusion     Nose: Congestion and rhinorrhea present.      Mouth/Throat:      Mouth: Mucous membranes are moist.      Pharynx: Oropharynx is clear. Posterior oropharyngeal erythema present. No oropharyngeal exudate.   Eyes:      General: No scleral icterus.        Right eye: No discharge.         Left eye: No discharge.      Extraocular Movements: Extraocular movements intact.      " Conjunctiva/sclera: Conjunctivae normal.      Pupils: Pupils are equal, round, and reactive to light.   Neck:      Vascular: No carotid bruit.   Cardiovascular:      Rate and Rhythm: Normal rate and regular rhythm.      Pulses: Normal pulses.      Heart sounds: Normal heart sounds. No murmur heard.     No friction rub. No gallop.   Pulmonary:      Effort: Pulmonary effort is normal. No respiratory distress.      Breath sounds: Normal breath sounds. No stridor. No wheezing, rhonchi or rales.   Chest:      Chest wall: No tenderness.   Musculoskeletal:      Cervical back: Normal range of motion and neck supple. No rigidity or tenderness.   Lymphadenopathy:      Cervical: No cervical adenopathy.   Neurological:      Mental Status: She is alert.

## 2024-11-21 ENCOUNTER — TELEPHONE (OUTPATIENT)
Age: 44
End: 2024-11-21

## 2024-11-21 DIAGNOSIS — J01.00 SUBACUTE MAXILLARY SINUSITIS: Primary | ICD-10-CM

## 2024-11-21 RX ORDER — AMOXICILLIN 500 MG/1
500 CAPSULE ORAL EVERY 8 HOURS SCHEDULED
Qty: 21 CAPSULE | Refills: 0 | Status: SHIPPED | OUTPATIENT
Start: 2024-11-21 | End: 2024-11-28

## 2024-11-21 NOTE — TELEPHONE ENCOUNTER
Patient called she was seen at urgent care 11/19/24 and prescribed Doxycycline and prednisone.  She started medications yesterday, since starting she has felt nauseous and vomiting.  She last vomited about an hour ago.  She thinks it is from doxycycline, she asked if a different medication could be prescribed.    [facility] pharmacy in [place]    Thank you

## 2024-12-17 ENCOUNTER — TELEPHONE (OUTPATIENT)
Dept: FAMILY MEDICINE CLINIC | Facility: CLINIC | Age: 44
End: 2024-12-17

## 2024-12-17 NOTE — TELEPHONE ENCOUNTER
Cancelled appt on 12/19/2024 with Dr Marshall and left a vm and sent myc msg to r/s.. POD double booked appt over anoter already in the schedule

## 2024-12-31 ENCOUNTER — OFFICE VISIT (OUTPATIENT)
Dept: FAMILY MEDICINE CLINIC | Facility: CLINIC | Age: 44
End: 2024-12-31
Payer: COMMERCIAL

## 2024-12-31 VITALS
OXYGEN SATURATION: 98 % | TEMPERATURE: 97.9 F | DIASTOLIC BLOOD PRESSURE: 70 MMHG | HEIGHT: 68 IN | BODY MASS INDEX: 23.34 KG/M2 | WEIGHT: 154 LBS | HEART RATE: 83 BPM | SYSTOLIC BLOOD PRESSURE: 124 MMHG | RESPIRATION RATE: 18 BRPM

## 2024-12-31 DIAGNOSIS — Z87.898 HISTORY OF SYNCOPE: Primary | ICD-10-CM

## 2024-12-31 DIAGNOSIS — J30.89 NON-SEASONAL ALLERGIC RHINITIS, UNSPECIFIED TRIGGER: ICD-10-CM

## 2024-12-31 PROBLEM — N89.8 VAGINAL ODOR: Status: RESOLVED | Noted: 2024-05-31 | Resolved: 2024-12-31

## 2024-12-31 PROCEDURE — 99214 OFFICE O/P EST MOD 30 MIN: CPT | Performed by: INTERNAL MEDICINE

## 2024-12-31 RX ORDER — FLUTICASONE PROPIONATE 50 MCG
2 SPRAY, SUSPENSION (ML) NASAL DAILY
Qty: 16 G | Refills: 2 | Status: SHIPPED | OUTPATIENT
Start: 2024-12-31

## 2024-12-31 NOTE — ASSESSMENT & PLAN NOTE
No recurrent issues Pt has been maintaining better hydration and improved eating habits with structured schedule

## 2024-12-31 NOTE — ASSESSMENT & PLAN NOTE
Orders:    fluticasone (FLONASE) 50 mcg/act nasal spray; 2 sprays into each nostril daily  Stable on daily flonase Stay hydrated

## 2024-12-31 NOTE — PROGRESS NOTES
Name: Chaka Stiles      : 1980      MRN: [id number]  Encounter Provider: Morena Marshall DO  Encounter Date: 2024   Encounter department: [facility]  :  Assessment & Plan  Non-seasonal allergic rhinitis, unspecified trigger    Orders:  •  fluticasone (FLONASE) 50 mcg/act nasal spray; 2 sprays into each nostril daily  Stable on daily flonase Stay hydrated   History of syncope  No recurrent issues Pt has been maintaining better hydration and improved eating habits with structured schedule       Rto 6months/annual      Depression Screening and Follow-up Plan: Patient was screened for depression during today's encounter. They screened negative with a PHQ-2 score of 0.    History of Present Illness     HPI  Pt doing well overall She started new PT job at [employer] and feels it is a good fit for her No recent illness No recurrent fainting episodes and she is drinking water often and eating regular schedule   Review of Systems   Constitutional:  Negative for chills and fever.   HENT: Negative.     Eyes:  Negative for visual disturbance.   Respiratory:  Negative for cough and shortness of breath.    Cardiovascular: Negative.    Gastrointestinal: Negative.    Genitourinary: Negative.    Musculoskeletal: Negative.    Skin: Negative.    Neurological: Negative.    Psychiatric/Behavioral: Negative.       Past Medical History:   Diagnosis Date   • Abnormal Pap smear of cervix    • GERD (gastroesophageal reflux disease)    • Herpes    • Psychiatric disorder     depression, anxiety   • Recurrent urinary tract infection 2018     Past Surgical History:   Procedure Laterality Date   • KNEE SURGERY     • ORIF TIBIA & FIBULA FRACTURES Right 2024    Procedure: OPEN REDUCTION W/ INTERNAL FIXATION (ORIF) ANKLE;  Surgeon: Jason Dawson DO;  Location: [facility];  Service: Orthopedics   • TUBAL LIGATION       Social History     Socioeconomic History   • Marital status: Single     Spouse  "name: Not on file   • Number of children: Not on file   • Years of education: Not on file   • Highest education level: Not on file   Occupational History   • Not on file   Tobacco Use   • Smoking status: Some Days     Current packs/day: 0.25     Average packs/day: 0.3 packs/day for 0.6 years (0.1 ttl pk-yrs)     Types: Cigarettes     Start date: 6/1/2024   • Smokeless tobacco: Never   Vaping Use   • Vaping status: Never Used   Substance and Sexual Activity   • Alcohol use: Yes     Alcohol/week: 3.0 standard drinks of alcohol     Types: 3 Standard drinks or equivalent per week     Comment: socially   • Drug use: Yes     Types: Marijuana     Comment: medical marijuana - daily   • Sexual activity: Yes     Partners: Male     Birth control/protection: Female Sterilization   Other Topics Concern   • Not on file   Social History Narrative   • Not on file     Social Drivers of Health     Financial Resource Strain: Not on file   Food Insecurity: Not on file   Transportation Needs: Not on file   Physical Activity: Not on file   Stress: Not on file   Social Connections: Unknown (6/18/2024)    Received from [employer]    • How often do you feel lonely or isolated from those around you? (Adult - for ages 18 years and over): Not on file   Intimate Partner Violence: Not on file   Housing Stability: Not on file     No Known Allergies    Objective   /70   Pulse 83   Temp 97.9 °F (36.6 °C) (Temporal)   Resp 18   Ht 5' 8\" (1.727 m)   Wt 69.9 kg (154 lb)   SpO2 98%   BMI 23.42 kg/m²      Physical Exam  Vitals and nursing note reviewed.   Constitutional:       General: She is not in acute distress.     Appearance: Normal appearance. She is not ill-appearing, toxic-appearing or diaphoretic.   HENT:      Head: Normocephalic and atraumatic.      Right Ear: External ear normal.      Left Ear: External ear normal.      Nose: Nose normal.      Mouth/Throat:      Mouth: Mucous membranes are moist.   Eyes:      " General: No scleral icterus.     Extraocular Movements: Extraocular movements intact.      Pupils: Pupils are equal, round, and reactive to light.   Cardiovascular:      Rate and Rhythm: Normal rate and regular rhythm.      Pulses: Normal pulses.      Heart sounds: Normal heart sounds.   Pulmonary:      Effort: Pulmonary effort is normal. No respiratory distress.      Breath sounds: Normal breath sounds. No wheezing.   Abdominal:      General: Bowel sounds are normal. There is no distension.      Palpations: Abdomen is soft.      Tenderness: There is no abdominal tenderness.   Musculoskeletal:         General: Normal range of motion.      Cervical back: Normal range of motion and neck supple.      Right lower leg: No edema.      Left lower leg: No edema.   Lymphadenopathy:      Cervical: No cervical adenopathy.   Skin:     General: Skin is warm and dry.      Capillary Refill: Capillary refill takes less than 2 seconds.   Neurological:      General: No focal deficit present.      Mental Status: She is alert and oriented to person, place, and time.      Cranial Nerves: No cranial nerve deficit.   Psychiatric:         Mood and Affect: Mood normal.         Behavior: Behavior normal.         Thought Content: Thought content normal.         Judgment: Judgment normal.

## 2025-01-24 ENCOUNTER — APPOINTMENT (OUTPATIENT)
Dept: LAB | Facility: MEDICAL CENTER | Age: 45
End: 2025-01-24
Payer: COMMERCIAL

## 2025-01-24 DIAGNOSIS — E55.9 VITAMIN D DEFICIENCY: ICD-10-CM

## 2025-01-24 DIAGNOSIS — Z00.00 ENCOUNTER FOR PREVENTIVE CARE: Primary | ICD-10-CM

## 2025-01-24 LAB
25(OH)D3 SERPL-MCNC: 50.7 NG/ML (ref 30–100)
ALBUMIN SERPL BCG-MCNC: 4.5 G/DL (ref 3.5–5)
ALP SERPL-CCNC: 32 U/L (ref 34–104)
ALT SERPL W P-5'-P-CCNC: 16 U/L (ref 7–52)
ANION GAP SERPL CALCULATED.3IONS-SCNC: 8 MMOL/L (ref 4–13)
AST SERPL W P-5'-P-CCNC: 17 U/L (ref 13–39)
BASOPHILS # BLD AUTO: 0.03 THOUSANDS/ΜL (ref 0–0.1)
BASOPHILS NFR BLD AUTO: 1 % (ref 0–1)
BILIRUB SERPL-MCNC: 0.44 MG/DL (ref 0.2–1)
BUN SERPL-MCNC: 11 MG/DL (ref 5–25)
CALCIUM SERPL-MCNC: 9.6 MG/DL (ref 8.4–10.2)
CHLORIDE SERPL-SCNC: 102 MMOL/L (ref 96–108)
CHOLEST SERPL-MCNC: 118 MG/DL (ref ?–200)
CO2 SERPL-SCNC: 28 MMOL/L (ref 21–32)
CREAT SERPL-MCNC: 0.75 MG/DL (ref 0.6–1.3)
EOSINOPHIL # BLD AUTO: 0.08 THOUSAND/ΜL (ref 0–0.61)
EOSINOPHIL NFR BLD AUTO: 2 % (ref 0–6)
ERYTHROCYTE [DISTWIDTH] IN BLOOD BY AUTOMATED COUNT: 11.9 % (ref 11.6–15.1)
GFR SERPL CREATININE-BSD FRML MDRD: 98 ML/MIN/1.73SQ M
GLUCOSE P FAST SERPL-MCNC: 88 MG/DL (ref 65–99)
HCT VFR BLD AUTO: 39.5 % (ref 34.8–46.1)
HDLC SERPL-MCNC: 76 MG/DL
HGB BLD-MCNC: 13.4 G/DL (ref 11.5–15.4)
IMM GRANULOCYTES # BLD AUTO: 0.02 THOUSAND/UL (ref 0–0.2)
IMM GRANULOCYTES NFR BLD AUTO: 0 % (ref 0–2)
LDLC SERPL CALC-MCNC: 36 MG/DL (ref 0–100)
LYMPHOCYTES # BLD AUTO: 1.97 THOUSANDS/ΜL (ref 0.6–4.47)
LYMPHOCYTES NFR BLD AUTO: 37 % (ref 14–44)
MCH RBC QN AUTO: 33.9 PG (ref 26.8–34.3)
MCHC RBC AUTO-ENTMCNC: 33.9 G/DL (ref 31.4–37.4)
MCV RBC AUTO: 100 FL (ref 82–98)
MONOCYTES # BLD AUTO: 0.85 THOUSAND/ΜL (ref 0.17–1.22)
MONOCYTES NFR BLD AUTO: 16 % (ref 4–12)
NEUTROPHILS # BLD AUTO: 2.34 THOUSANDS/ΜL (ref 1.85–7.62)
NEUTS SEG NFR BLD AUTO: 44 % (ref 43–75)
NONHDLC SERPL-MCNC: 42 MG/DL
NRBC BLD AUTO-RTO: 0 /100 WBCS
PLATELET # BLD AUTO: 208 THOUSANDS/UL (ref 149–390)
PMV BLD AUTO: 11.4 FL (ref 8.9–12.7)
POTASSIUM SERPL-SCNC: 4 MMOL/L (ref 3.5–5.3)
PROT SERPL-MCNC: 7.3 G/DL (ref 6.4–8.4)
RBC # BLD AUTO: 3.95 MILLION/UL (ref 3.81–5.12)
SODIUM SERPL-SCNC: 138 MMOL/L (ref 135–147)
TRIGL SERPL-MCNC: 29 MG/DL (ref ?–150)
WBC # BLD AUTO: 5.29 THOUSAND/UL (ref 4.31–10.16)

## 2025-01-24 PROCEDURE — 36415 COLL VENOUS BLD VENIPUNCTURE: CPT

## 2025-01-24 PROCEDURE — 82306 VITAMIN D 25 HYDROXY: CPT

## 2025-01-24 PROCEDURE — 80053 COMPREHEN METABOLIC PANEL: CPT

## 2025-01-24 PROCEDURE — 85025 COMPLETE CBC W/AUTO DIFF WBC: CPT

## 2025-01-24 PROCEDURE — 80061 LIPID PANEL: CPT

## 2025-01-25 ENCOUNTER — RESULTS FOLLOW-UP (OUTPATIENT)
Dept: FAMILY MEDICINE CLINIC | Facility: CLINIC | Age: 45
End: 2025-01-25

## 2025-01-28 ENCOUNTER — OFFICE VISIT (OUTPATIENT)
Dept: URGENT CARE | Facility: MEDICAL CENTER | Age: 45
End: 2025-01-28
Payer: COMMERCIAL

## 2025-01-28 VITALS
TEMPERATURE: 98.3 F | OXYGEN SATURATION: 97 % | BODY MASS INDEX: 22.88 KG/M2 | DIASTOLIC BLOOD PRESSURE: 92 MMHG | HEIGHT: 68 IN | SYSTOLIC BLOOD PRESSURE: 116 MMHG | WEIGHT: 151 LBS | RESPIRATION RATE: 18 BRPM | HEART RATE: 92 BPM

## 2025-01-28 DIAGNOSIS — H65.191 ACUTE MUCOID OTITIS MEDIA OF RIGHT EAR: Primary | ICD-10-CM

## 2025-01-28 PROCEDURE — S9088 SERVICES PROVIDED IN URGENT: HCPCS

## 2025-01-28 PROCEDURE — 99213 OFFICE O/P EST LOW 20 MIN: CPT

## 2025-01-28 RX ORDER — AMOXICILLIN 500 MG/1
500 CAPSULE ORAL EVERY 8 HOURS SCHEDULED
Qty: 21 CAPSULE | Refills: 0 | Status: SHIPPED | OUTPATIENT
Start: 2025-01-28 | End: 2025-02-04

## 2025-01-28 NOTE — PROGRESS NOTES
[facility]        NAME: Chaka Stiles is a 44 y.o. female  : 1980    MRN: [id number]  DATE: 2025  TIME: 5:48 PM    Assessment and Plan   Acute mucoid otitis media of right ear [H65.191]  1. Acute mucoid otitis media of right ear  amoxicillin (AMOXIL) 500 mg capsule            Patient Instructions       Follow up with PCP in 3-5 days.  Proceed to  ER if symptoms worsen.    If tests are performed, our office will contact you with results only if changes need to made to the care plan discussed with you at the visit. You can review your full results on [facility].    Chief Complaint     Chief Complaint   Patient presents with   • Nasal Congestion     Pt complains of sinus congestion for 2wks, fever, cough, B/L earache, Ibuprofen taken for relief         History of Present Illness       Patient here with sinus congestion and cough. Also having cough and ear pain. Son also being seen with similar symptoms. At home taking motrin. Last dose around 10am. Onset was about 2 weeks. Fever TMAX 102-103. She does take a daily antihistamine and also uses daily nasal spray which has been unhelpful with her symptoms. Pain/pressure in the cheek area.         Review of Systems   Review of Systems   Constitutional:  Positive for fatigue and fever. Negative for appetite change and chills.   HENT:  Positive for congestion, ear pain and rhinorrhea. Negative for sinus pressure, sinus pain, sore throat and trouble swallowing.    Respiratory:  Positive for cough. Negative for shortness of breath.    Cardiovascular:  Negative for chest pain and palpitations.   Gastrointestinal:  Negative for abdominal pain, constipation, diarrhea, nausea and vomiting.   Musculoskeletal:  Negative for arthralgias, back pain and myalgias.   Skin:  Negative for color change and rash.   Neurological:  Negative for dizziness, light-headedness and headaches.   All other systems reviewed and are negative.        Current  Medications       Current Outpatient Medications:   •  amoxicillin (AMOXIL) 500 mg capsule, Take 1 capsule (500 mg total) by mouth every 8 (eight) hours for 7 days, Disp: 21 capsule, Rfl: 0  •  calcium carbonate (OS-BEKA) 600 MG tablet, Take 600 mg by mouth daily, Disp: , Rfl:   •  cetirizine (ZyrTEC) 10 mg tablet, Take 10 mg by mouth daily, Disp: , Rfl:   •  diphenhydrAMINE (BENADRYL) 25 mg capsule, Take 25 mg by mouth every 6 (six) hours as needed for allergies, Disp: , Rfl:   •  fluticasone (FLONASE) 50 mcg/act nasal spray, 2 sprays into each nostril daily, Disp: 16 g, Rfl: 2  •  Magnesium 250 MG TABS, Take 250 mg by mouth daily, Disp: , Rfl:   •  multivitamin (THERAGRAN) TABS, Take 1 tablet by mouth daily, Disp: , Rfl:   •  NON FORMULARY, Medical Marijuana, Disp: , Rfl:   •  ondansetron (ZOFRAN-ODT) 4 mg disintegrating tablet, Take 1 tablet (4 mg total) by mouth every 6 (six) hours as needed for nausea or vomiting, Disp: 15 tablet, Rfl: 0  •  Probiotic Product (PROBIOTIC PEARLS WOMENS PO), Take by mouth, Disp: , Rfl:   •  SODIUM FLUORIDE, DENTAL GEL, 1.1 % CREA, Apply to teeth daily at bedtime brush daily at bedtime, spit out, don't rinse. Floss. Nothing to eat or drink after., Disp: 51 g, Rfl: 20  •  aspirin 81 mg chewable tablet, Chew 1 tablet (81 mg total) 2 (two) times a day for 14 days, Disp: 28 tablet, Rfl: 0    Current Allergies     Allergies as of 01/28/2025   • (No Known Allergies)            The following portions of the patient's history were reviewed and updated as appropriate: allergies, current medications, past family history, past medical history, past social history, past surgical history and problem list.     Past Medical History:   Diagnosis Date   • Abnormal Pap smear of cervix    • GERD (gastroesophageal reflux disease)    • Herpes    • Psychiatric disorder     depression, anxiety   • Recurrent urinary tract infection 03/22/2018       Past Surgical History:   Procedure Laterality Date   •  "KNEE SURGERY     • ORIF TIBIA & FIBULA FRACTURES Right 1/24/2024    Procedure: OPEN REDUCTION W/ INTERNAL FIXATION (ORIF) ANKLE;  Surgeon: Jason Dawson DO;  Location: [facility];  Service: Orthopedics   • TUBAL LIGATION         Family History   Problem Relation Age of Onset   • No Known Problems Mother    • Cancer Father         type unknown   • Heart disease Father    • Heart attack Father    • Stroke Father    • Other Father         transplant kidney  or  liver   • Drug abuse Sister    • No Known Problems Daughter    • Heart murmur Son    • Colon cancer Maternal Grandmother    • Heart disease Maternal Grandfather    • Heart attack Maternal Grandfather    • Dementia Paternal Grandmother    • Lung disease Paternal Grandfather    • No Known Problems Paternal Aunt    • No Known Problems Paternal Aunt    • BRCA2 Positive Neg Hx    • BRCA1 Negative Neg Hx    • Ovarian cancer Neg Hx    • Endometrial cancer Neg Hx    • BRCA 1/2 Neg Hx    • Breast cancer Neg Hx          Medications have been verified.        Objective   /92   Pulse 92   Temp 98.3 °F (36.8 °C)   Resp 18   Ht 5' 8\" (1.727 m)   Wt 68.5 kg (151 lb)   SpO2 97%   BMI 22.96 kg/m²        Physical Exam     Physical Exam  Vitals and nursing note reviewed.   Constitutional:       General: She is awake. She is not in acute distress.     Appearance: Normal appearance. She is well-developed, well-groomed and normal weight. She is ill-appearing.   HENT:      Head: Normocephalic and atraumatic.      Right Ear: Ear canal and external ear normal. Tenderness present. A middle ear effusion is present. Tympanic membrane is bulging.      Left Ear: Tympanic membrane, ear canal and external ear normal.      Ears:      Comments: Purulent appearance to the middle ear effusion.      Nose: Congestion and rhinorrhea present. Rhinorrhea is clear.      Mouth/Throat:      Lips: Pink.      Mouth: Mucous membranes are moist.      Pharynx: Oropharynx is clear.   Eyes:     "  Extraocular Movements: Extraocular movements intact.      Conjunctiva/sclera: Conjunctivae normal.      Pupils: Pupils are equal, round, and reactive to light.   Cardiovascular:      Rate and Rhythm: Normal rate and regular rhythm.      Pulses: Normal pulses.      Heart sounds: Normal heart sounds.   Pulmonary:      Effort: Pulmonary effort is normal.      Breath sounds: Normal breath sounds. No decreased breath sounds, wheezing, rhonchi or rales.   Abdominal:      General: Abdomen is flat. Bowel sounds are normal.      Palpations: Abdomen is soft.   Musculoskeletal:         General: Normal range of motion.      Cervical back: Full passive range of motion without pain, normal range of motion and neck supple.   Skin:     General: Skin is warm and dry.      Capillary Refill: Capillary refill takes less than 2 seconds.      Findings: No rash.   Neurological:      General: No focal deficit present.      Mental Status: She is alert and oriented to person, place, and time.   Psychiatric:         Mood and Affect: Mood normal.         Behavior: Behavior normal. Behavior is cooperative.

## 2025-01-28 NOTE — PATIENT INSTRUCTIONS
You may take over the counter Tylenol (Acetaminophen) and/or Motrin (Ibuprofen) as needed, as directed on packaging. Be sure to get plenty of rest, and drinking fluids to remain hydrated.     Please follow up with your primary provider in the next several days. Should you have any worsening of symptoms, or lack of improvement please be re-evaluated. If needed for significant concerns, consider 911 or ER evaluation.     Over the counter decongestants can be used, only as directed on the box. However if you have any history of cardiac disease or high blood pressure these should be avoided, as we caution the use of these since they can make place strain on your heart and cause increase in blood pressure. It is recommended in lieu of decongestants to use cool mist vaporizer, saline nasal spray to relieve nasal congestion. It is also important to remain hydrated and drink plenty of fluids (avoiding caffeine and alcohol).     OVER THE COUNTER: Flonase (fluticasone) nasal spray or Astepro (azelastine) nasal spray may be appropriate for your symptoms as well. Please follow the directions on the package for use. (Store brand is perfectly fine). Please AVOID the use of Afrin (oxymetazoline) nasal spray for longer than 2 days as this can cause rebound congestion which is typically worse than the congestion you started with.

## 2025-04-10 ENCOUNTER — HOSPITAL ENCOUNTER (OUTPATIENT)
Dept: ULTRASOUND IMAGING | Facility: HOSPITAL | Age: 45
Discharge: HOME | End: 2025-04-10
Attending: OBSTETRICS & GYNECOLOGY
Payer: COMMERCIAL

## 2025-04-10 ENCOUNTER — HOSPITAL ENCOUNTER (OUTPATIENT)
Dept: MAMMOGRAPHY | Facility: HOSPITAL | Age: 45
Discharge: HOME | End: 2025-04-10
Attending: OBSTETRICS & GYNECOLOGY
Payer: COMMERCIAL

## 2025-04-10 VITALS — HEIGHT: 68 IN | BODY MASS INDEX: 22.88 KG/M2 | WEIGHT: 151 LBS

## 2025-04-10 DIAGNOSIS — R92.8 ABNORMAL MAMMOGRAM: ICD-10-CM

## 2025-04-10 PROCEDURE — 76642 ULTRASOUND BREAST LIMITED: CPT

## 2025-04-10 PROCEDURE — 77066 DX MAMMO INCL CAD BI: CPT

## 2025-04-10 PROCEDURE — G0279 TOMOSYNTHESIS, MAMMO: HCPCS

## 2025-04-11 ENCOUNTER — RESULTS FOLLOW-UP (OUTPATIENT)
Dept: LABOR AND DELIVERY | Facility: HOSPITAL | Age: 45
End: 2025-04-11

## 2025-04-11 NOTE — RESULT ENCOUNTER NOTE
Please inform patient imaging reviewed showing  stable asymmetry for  2 years may return to annual Mammograms

## 2025-04-16 ENCOUNTER — OFFICE VISIT (OUTPATIENT)
Dept: DENTISTRY | Facility: CLINIC | Age: 45
End: 2025-04-16
Payer: COMMERCIAL

## 2025-04-16 DIAGNOSIS — K03.6 ACCRETIONS ON TEETH: Primary | ICD-10-CM

## 2025-04-16 PROCEDURE — D0190 SCREENING OF A PATIENT: HCPCS | Performed by: DENTAL HYGIENIST

## 2025-04-16 PROCEDURE — D1330 ORAL HYGIENE INSTRUCTIONS: HCPCS | Performed by: DENTAL HYGIENIST

## 2025-04-16 PROCEDURE — D1110 PROPHYLAXIS - ADULT: HCPCS | Performed by: DENTAL HYGIENIST

## 2025-04-16 RX ORDER — DEXAMETHASONE SODIUM PHOSPHATE 1 MG/ML
SOLUTION/ DROPS OPHTHALMIC
COMMUNITY
Start: 2025-04-15

## 2025-04-16 RX ORDER — LORATADINE 10 MG/1
CAPSULE, LIQUID FILLED ORAL
COMMUNITY
Start: 2024-12-01

## 2025-04-16 RX ORDER — TOBRAMYCIN 3 MG/ML
SOLUTION/ DROPS OPHTHALMIC
COMMUNITY
Start: 2025-04-15

## 2025-04-16 NOTE — PROGRESS NOTES
Adult Prophy  Chief Complaint   Patient presents with    Routine Oral Cleaning   Pt has not had a cleaning since pandemic  5+ years     Method Used:  Prophy Method Used: Ultrasonic Scaling  Hand Scaling  Polished  Flossed    Radiographs Taken in [facility]: (Taken to assess periodontal health)  None    Intra/Extra Oral Cancer Screening:  Within normal limits    Oral Hygiene:  Fair    Plaque:  Light    Calculus:  Moderate    Bleeding:  Moderate    Stain:  Moderate  Heavy  Coffee/Tea  Tobacco    Periodontal Charting:   Spot probing    Periodontal Classification:  Generalized  Mild  Periodontal Disease    Oral Hygiene Instruction:    Went over daily routine and c-shaped flossing.   Recommended a Waterpik and discussed whitening   Discussed Oral systemic link and role of plaque in gum disease.    No orders of the defined types were placed in this encounter.       Next Visit:  6 Month [facility] full probes   Dentist visit- resins

## 2025-05-09 ENCOUNTER — OFFICE VISIT (OUTPATIENT)
Dept: URGENT CARE | Facility: MEDICAL CENTER | Age: 45
End: 2025-05-09
Payer: COMMERCIAL

## 2025-05-09 VITALS
SYSTOLIC BLOOD PRESSURE: 110 MMHG | HEART RATE: 83 BPM | RESPIRATION RATE: 16 BRPM | TEMPERATURE: 98.5 F | DIASTOLIC BLOOD PRESSURE: 64 MMHG | OXYGEN SATURATION: 98 % | WEIGHT: 148 LBS | BODY MASS INDEX: 22.5 KG/M2

## 2025-05-09 DIAGNOSIS — J06.9 VIRAL URI: Primary | ICD-10-CM

## 2025-05-09 DIAGNOSIS — J02.9 SORETHROAT: ICD-10-CM

## 2025-05-09 DIAGNOSIS — J30.2 SEASONAL ALLERGIES: ICD-10-CM

## 2025-05-09 LAB — S PYO AG THROAT QL: NEGATIVE

## 2025-05-09 PROCEDURE — 87880 STREP A ASSAY W/OPTIC: CPT | Performed by: PHYSICIAN ASSISTANT

## 2025-05-09 PROCEDURE — S9088 SERVICES PROVIDED IN URGENT: HCPCS | Performed by: PHYSICIAN ASSISTANT

## 2025-05-09 PROCEDURE — 87070 CULTURE OTHR SPECIMN AEROBIC: CPT | Performed by: PHYSICIAN ASSISTANT

## 2025-05-09 PROCEDURE — 99214 OFFICE O/P EST MOD 30 MIN: CPT | Performed by: PHYSICIAN ASSISTANT

## 2025-05-09 RX ORDER — OLOPATADINE HYDROCHLORIDE 1 MG/ML
1 SOLUTION/ DROPS OPHTHALMIC 2 TIMES DAILY
Qty: 5 ML | Refills: 0 | Status: SHIPPED | OUTPATIENT
Start: 2025-05-09

## 2025-05-09 NOTE — PATIENT INSTRUCTIONS
Continue daily allergy medications.   Change clothes and wash hands/face when coming inside from outdoor play.  In the future, recommend starting 6 weeks in advance of known allergy symptoms to help prevent allergy burden at the height of known allergy season.   F/U with worsening or failure to improve

## 2025-05-09 NOTE — PROGRESS NOTES
[facility]        NAME: Chaka Stiles is a 45 y.o. female  : 1980    MRN: [id number]  DATE: May 9, 2025  TIME: 9:02 AM    Assessment and Plan   Viral URI [J06.9]  1. Viral URI        2. Seasonal allergies  olopatadine (PATANOL) 0.1 % ophthalmic solution      3. Sorethroat  POCT rapid ANTIGEN strepA    Throat culture    Throat culture        Andralselina presented with combination seasonal allergies and viral URI complicating her baseline nasal congestion.  Negative rapid strep. Will send out for culture and treat if positive.   Continue daily allergy medications. Start olopatadine drops, 1 drop each eye 1-2 times per day as needed.   Change clothes and wash hands/face when coming inside from outdoor play.  In the future, recommend starting 6 weeks in advance of known allergy symptoms to help prevent allergy burden at the height of known allergy season.   Recommend supportive measures for URI: hydration, good nutrition, rest, antipyretics PRN.    Patient Instructions       Follow up with PCP in 3-5 days.  Proceed to  ER if symptoms worsen.    If tests have been performed at [facility], our office will contact you with results if changes need to be made to the care plan discussed with you at the visit.  You can review your full results on [facility].    Chief Complaint     Chief Complaint   Patient presents with    Sore Throat     Sorethroat and white spots for past 2 days with head congestion.         History of Present Illness       Andraleen presents for evaluation of sore throat, sinus pressure and congestion x 2 days.   Patient has allergies with migraines, typically and initially thought it was allergies. Patient is compliant with taking flonase and zyrtec daily. Uses benadryl at night.   Excedrine as needed.  Normal activities, appetite, bowel habits.   Denies fever.         Review of Systems   Review of Systems   Constitutional:  Negative for activity change, appetite change, fatigue  and fever.   HENT:  Positive for sore throat. Negative for congestion, ear pain, rhinorrhea, sinus pressure, sinus pain, sneezing and trouble swallowing.    Eyes:  Negative for discharge and redness.   Respiratory:  Negative for cough, shortness of breath and wheezing.    Gastrointestinal:  Negative for abdominal pain, constipation, diarrhea, nausea and vomiting.   Skin:  Negative for rash.         Current Medications       Current Outpatient Medications:     aspirin 81 mg chewable tablet, Chew 1 tablet (81 mg total) 2 (two) times a day for 14 days, Disp: 28 tablet, Rfl: 0    calcium carbonate (OS-BEKA) 600 MG tablet, Take 600 mg by mouth daily, Disp: , Rfl:     cetirizine (ZyrTEC) 10 mg tablet, Take 10 mg by mouth daily, Disp: , Rfl:     diphenhydrAMINE (BENADRYL) 25 mg capsule, Take 25 mg by mouth every 6 (six) hours as needed for allergies, Disp: , Rfl:     fluticasone (FLONASE) 50 mcg/act nasal spray, 2 sprays into each nostril daily, Disp: 16 g, Rfl: 2    Loratadine 10 MG CAPS, , Disp: , Rfl:     Magnesium 250 MG TABS, Take 250 mg by mouth daily, Disp: , Rfl:     multivitamin (THERAGRAN) TABS, Take 1 tablet by mouth daily, Disp: , Rfl:     NON FORMULARY, Medical Marijuana, Disp: , Rfl:     olopatadine (PATANOL) 0.1 % ophthalmic solution, Administer 1 drop to both eyes 2 (two) times a day, Disp: 5 mL, Rfl: 0    SODIUM FLUORIDE, DENTAL GEL, 1.1 % CREA, Apply to teeth daily at bedtime brush daily at bedtime, spit out, don't rinse. Floss. Nothing to eat or drink after., Disp: 51 g, Rfl: 20    dexamethasone sodium phosphate 0.1 % ophthalmic solution, , Disp: , Rfl:     ondansetron (ZOFRAN-ODT) 4 mg disintegrating tablet, Take 1 tablet (4 mg total) by mouth every 6 (six) hours as needed for nausea or vomiting (Patient not taking: Reported on 5/9/2025), Disp: 15 tablet, Rfl: 0    tobramycin (TOBREX) 0.3 % SOLN, , Disp: , Rfl:     Current Allergies     Allergies as of 05/09/2025    (No Known Allergies)            The  following portions of the patient's history were reviewed and updated as appropriate: allergies, current medications, past family history, past medical history, past social history, past surgical history and problem list.     Past Medical History:   Diagnosis Date    Abnormal Pap smear of cervix     GERD (gastroesophageal reflux disease)     Herpes     Psychiatric disorder     depression, anxiety    Recurrent urinary tract infection 03/22/2018       Past Surgical History:   Procedure Laterality Date    KNEE SURGERY      ORIF TIBIA & FIBULA FRACTURES Right 1/24/2024    Procedure: OPEN REDUCTION W/ INTERNAL FIXATION (ORIF) ANKLE;  Surgeon: Jason Dawson DO;  Location: [facility];  Service: Orthopedics    TUBAL LIGATION         Family History   Problem Relation Age of Onset    No Known Problems Mother     Cancer Father         type unknown    Heart disease Father     Heart attack Father     Stroke Father     Other Father         transplant kidney  or  liver    Drug abuse Sister     No Known Problems Daughter     Heart murmur Son     Colon cancer Maternal Grandmother     Heart disease Maternal Grandfather     Heart attack Maternal Grandfather     Dementia Paternal Grandmother     Lung disease Paternal Grandfather     No Known Problems Paternal Aunt     No Known Problems Paternal Aunt     BRCA2 Positive Neg Hx     BRCA1 Negative Neg Hx     Ovarian cancer Neg Hx     Endometrial cancer Neg Hx     BRCA 1/2 Neg Hx     Breast cancer Neg Hx          Medications have been verified.        Objective   /64 (BP Location: Left arm, Patient Position: Sitting, Cuff Size: Standard)   Pulse 83   Temp 98.5 °F (36.9 °C) (Temporal)   Resp 16   Wt 67.1 kg (148 lb)   SpO2 98%   BMI 22.50 kg/m²   No LMP recorded.       Physical Exam     Physical Exam  Vitals and nursing note reviewed.   Constitutional:       General: She is awake.      Appearance: Normal appearance. She is well-developed, well-groomed and normal weight. She  is not ill-appearing.   HENT:      Head: Normocephalic.      Right Ear: External ear normal.      Left Ear: External ear normal.      Nose: Congestion and rhinorrhea present. No nasal deformity. Rhinorrhea is clear.      Mouth/Throat:      Lips: Pink. No lesions.      Mouth: Mucous membranes are moist.      Pharynx: Posterior oropharyngeal erythema and postnasal drip (with cobblestoning of posterior oropharynx) present. No oropharyngeal exudate.      Tonsils: No tonsillar exudate.      Comments: Left tonsil with 1 crypt containing start of tonsilolith, not exudate  Eyes:      General: Lids are normal. Allergic shiner present.      Conjunctiva/sclera: Conjunctivae normal.      Pupils: Pupils are equal, round, and reactive to light.   Neck:      Thyroid: No thyromegaly.   Cardiovascular:      Rate and Rhythm: Normal rate and regular rhythm.      Heart sounds: Normal heart sounds. No murmur heard.  Pulmonary:      Effort: Pulmonary effort is normal.      Breath sounds: Normal breath sounds. No decreased breath sounds, wheezing, rhonchi or rales.   Abdominal:      General: Bowel sounds are normal.      Palpations: Abdomen is soft.      Tenderness: There is no abdominal tenderness.      Hernia: No hernia is present.   Musculoskeletal:      Cervical back: Normal range of motion and neck supple.   Lymphadenopathy:      Head:      Right side of head: No submandibular, tonsillar, preauricular or posterior auricular adenopathy.      Left side of head: No submandibular, tonsillar, preauricular or posterior auricular adenopathy.      Cervical: No cervical adenopathy.   Skin:     General: Skin is warm and dry.      Capillary Refill: Capillary refill takes less than 2 seconds.      Coloration: Skin is not pale.      Findings: No rash.   Neurological:      Mental Status: She is alert and oriented to person, place, and time.      Comments: CN II-X grossly intact.   Psychiatric:         Speech: Speech normal.         Behavior:  Behavior normal. Behavior is cooperative.

## 2025-05-11 ENCOUNTER — RESULTS FOLLOW-UP (OUTPATIENT)
Dept: URGENT CARE | Facility: MEDICAL CENTER | Age: 45
End: 2025-05-11

## 2025-05-11 LAB — BACTERIA THROAT CULT: NORMAL

## 2025-06-25 ENCOUNTER — OFFICE VISIT (OUTPATIENT)
Dept: FAMILY MEDICINE CLINIC | Facility: CLINIC | Age: 45
End: 2025-06-25
Payer: COMMERCIAL

## 2025-06-25 VITALS
BODY MASS INDEX: 23.05 KG/M2 | WEIGHT: 151.6 LBS | TEMPERATURE: 97.8 F | DIASTOLIC BLOOD PRESSURE: 70 MMHG | OXYGEN SATURATION: 99 % | RESPIRATION RATE: 18 BRPM | HEART RATE: 68 BPM | SYSTOLIC BLOOD PRESSURE: 122 MMHG

## 2025-06-25 DIAGNOSIS — Z00.00 ANNUAL PHYSICAL EXAM: Primary | ICD-10-CM

## 2025-06-25 PROBLEM — R55 SYNCOPE: Status: RESOLVED | Noted: 2024-07-25 | Resolved: 2025-06-25

## 2025-06-25 PROCEDURE — 99396 PREV VISIT EST AGE 40-64: CPT | Performed by: INTERNAL MEDICINE

## 2025-06-25 NOTE — ASSESSMENT & PLAN NOTE
Recent labs reviewed/stable  Resume regular exercise program  GYN upcoming Mammo utd  Colon screening age 47

## 2025-06-25 NOTE — PROGRESS NOTES
Adult Annual Physical  Name: Chaka Stiles      : 1980      MRN: [id number]  Encounter Provider: Morena Marshall DO  Encounter Date: 2025   Encounter department: [facility]    :  Assessment & Plan  Annual physical exam  Recent labs reviewed/stable  Resume regular exercise program  GYN upcoming Mammo utd  Colon screening age 47            Preventive Screenings:  - Diabetes Screening: screening up-to-date  - Cholesterol Screening: screening up-to-date   - Cervical cancer screening: screening up-to-date   - Breast cancer screening: screening up-to-date   - Colon cancer screening: risks/benefits discussed   - Lung cancer screening: screening not indicated     Immunizations:  - Immunizations due: Prevnar 20      Tobacco Cessation Counseling: Tobacco cessation counseling was provided. The patient is sincerely urged to quit consumption of tobacco. She is not ready to quit tobacco.       History of Present Illness   Pt generally well She is trying to get back to regular exercise program She started new job at [employer] in past few weeks No acute issues No recurrent syncope td and has GYN appt upcoming No change in bowels     Adult Annual Physical:  Patient presents for annual physical.     Diet and Physical Activity:  - Diet/Nutrition: well balanced diet, limited junk food and consuming 3-5 servings of fruits/vegetables daily.  - Exercise: no formal exercise.    General Health:  - Sleep: sleeps poorly and 4-6 hours of sleep on average.  - Hearing: normal hearing right ear and normal hearing left ear.  - Vision: most recent eye exam < 1 year ago.  - Dental: regular dental visits.    /GYN Health:  - Follows with GYN: yes.   - Last menstrual cycle: 2025.   - History of STDs: no  - Contraception: tubal ligation.      Advanced Care Planning:  - Has an advanced directive?: no    - Has a durable medical POA?: no      Review of Systems   Constitutional:  Negative for chills and fever.   HENT:  Negative.     Eyes:  Negative for visual disturbance.   Respiratory: Negative.     Cardiovascular: Negative.    Gastrointestinal: Negative.    Genitourinary: Negative.    Musculoskeletal: Negative.    Neurological: Negative.    Psychiatric/Behavioral: Negative.     Past Medical History[1]  Past Surgical History[2]  Social History     Socioeconomic History   • Marital status: Single     Spouse name: Not on file   • Number of children: Not on file   • Years of education: Not on file   • Highest education level: Not on file   Occupational History   • Not on file   Tobacco Use   • Smoking status: Some Days     Current packs/day: 0.25     Average packs/day: 0.3 packs/day for 1.1 years (0.3 ttl pk-yrs)     Types: Cigarettes     Start date: 6/1/2024   • Smokeless tobacco: Never   Vaping Use   • Vaping status: Never Used   Substance and Sexual Activity   • Alcohol use: Yes     Alcohol/week: 3.0 standard drinks of alcohol     Types: 3 Standard drinks or equivalent per week     Comment: socially   • Drug use: Yes     Types: Marijuana     Comment: medical marijuana - daily   • Sexual activity: Yes     Partners: Male     Birth control/protection: Female Sterilization   Other Topics Concern   • Not on file   Social History Narrative   • Not on file     Social Drivers of Health     Financial Resource Strain: Not on file   Food Insecurity: No Food Insecurity (6/21/2025)    Nursing - Inadequate Food Risk Classification    • Worried About Running Out of Food in the Last Year: Never true    • Ran Out of Food in the Last Year: Never true    • Ran Out of Food in the Last Year: Not on file   Transportation Needs: No Transportation Needs (6/21/2025)    PRAPARE - Transportation    • Lack of Transportation (Medical): No    • Lack of Transportation (Non-Medical): No   Physical Activity: Not on file   Stress: Not on file   Social Connections: Unknown (6/18/2024)    Received from [employer]    Social [employer]    • How often do you feel  lonely or isolated from those around you? (Adult - for ages 18 years and over): Not on file   Intimate Partner Violence: Not on file   Housing Stability: Low Risk  (6/21/2025)    Housing Stability Vital Sign    • Unable to Pay for Housing in the Last Year: No    • Number of Times Moved in the Last Year: 0    • Homeless in the Last Year: No     No Known Allergies      Objective   /70   Pulse 68   Temp 97.8 °F (36.6 °C)   Resp 18   Wt 68.8 kg (151 lb 9.6 oz)   LMP 06/11/2025   SpO2 99%   BMI 23.05 kg/m²     Physical Exam  Vitals and nursing note reviewed.   Constitutional:       General: She is not in acute distress.     Appearance: Normal appearance. She is not ill-appearing, toxic-appearing or diaphoretic.   HENT:      Head: Normocephalic and atraumatic.      Right Ear: External ear normal.      Left Ear: External ear normal.      Nose: Nose normal.      Mouth/Throat:      Mouth: Mucous membranes are moist.     Eyes:      General: No scleral icterus.     Extraocular Movements: Extraocular movements intact.      Conjunctiva/sclera: Conjunctivae normal.      Pupils: Pupils are equal, round, and reactive to light.       Cardiovascular:      Rate and Rhythm: Normal rate and regular rhythm.      Pulses: Normal pulses.      Heart sounds: Normal heart sounds. No murmur heard.  Pulmonary:      Effort: Pulmonary effort is normal. No respiratory distress.      Breath sounds: Normal breath sounds. No wheezing.   Abdominal:      General: There is no distension.      Palpations: Abdomen is soft.     Musculoskeletal:      Cervical back: Normal range of motion and neck supple.      Right lower leg: No edema.      Left lower leg: No edema.   Lymphadenopathy:      Cervical: No cervical adenopathy.     Skin:     General: Skin is warm and dry.      Coloration: Skin is not jaundiced or pale.     Neurological:      General: No focal deficit present.      Mental Status: She is alert and oriented to person, place, and time.  Mental status is at baseline.      Cranial Nerves: No cranial nerve deficit.     Psychiatric:         Mood and Affect: Mood normal.         Behavior: Behavior normal.         Thought Content: Thought content normal.         Judgment: Judgment normal.              [1]  Past Medical History:  Diagnosis Date   • Abnormal Pap smear of cervix    • GERD (gastroesophageal reflux disease)    • Herpes    • Psychiatric disorder     depression, anxiety   • Recurrent urinary tract infection 03/22/2018   [2]  Past Surgical History:  Procedure Laterality Date   • KNEE SURGERY     • ORIF TIBIA & FIBULA FRACTURES Right 1/24/2024    Procedure: OPEN REDUCTION W/ INTERNAL FIXATION (ORIF) ANKLE;  Surgeon: Jason Dawson DO;  Location: [facility];  Service: Orthopedics   • TUBAL LIGATION

## 2025-07-01 ENCOUNTER — OFFICE VISIT (OUTPATIENT)
Dept: URGENT CARE | Facility: MEDICAL CENTER | Age: 45
End: 2025-07-01
Payer: COMMERCIAL

## 2025-07-01 VITALS
BODY MASS INDEX: 22.51 KG/M2 | TEMPERATURE: 98 F | DIASTOLIC BLOOD PRESSURE: 70 MMHG | HEART RATE: 97 BPM | HEIGHT: 69 IN | OXYGEN SATURATION: 98 % | RESPIRATION RATE: 16 BRPM | WEIGHT: 152 LBS | SYSTOLIC BLOOD PRESSURE: 136 MMHG

## 2025-07-01 DIAGNOSIS — L50.9 URTICARIA: Primary | ICD-10-CM

## 2025-07-01 PROCEDURE — 99213 OFFICE O/P EST LOW 20 MIN: CPT | Performed by: PHYSICIAN ASSISTANT

## 2025-07-01 PROCEDURE — S9088 SERVICES PROVIDED IN URGENT: HCPCS | Performed by: PHYSICIAN ASSISTANT

## 2025-07-01 RX ORDER — METHYLPREDNISOLONE 4 MG/1
TABLET ORAL
Qty: 1 EACH | Refills: 0 | Status: SHIPPED | OUTPATIENT
Start: 2025-07-01

## 2025-07-01 RX ORDER — CETIRIZINE HYDROCHLORIDE 10 MG/1
10 TABLET, CHEWABLE ORAL DAILY
Qty: 30 TABLET | Refills: 0 | Status: SHIPPED | OUTPATIENT
Start: 2025-07-01

## 2025-07-01 NOTE — PATIENT INSTRUCTIONS
Start medrol dose pack. Advised patient to take each day's allotment of tablets all together in the morning with food to avoid upset stomach and/or insomnia. Take with a full glass of water.   Take zyrtec once daily.  Take benadryl before bed.   Use fragrance free and sensitive skin products.

## 2025-07-01 NOTE — PROGRESS NOTES
[facility]        NAME: Chaka Stiles is a 45 y.o. female  : 1980    MRN: [id number]  DATE: 2025  TIME: 5:52 PM    Assessment and Plan   Urticaria [L50.9]  1. Urticaria  methylPREDNISolone 4 MG tablet therapy pack    cetirizine (ZyrTEC) 10 MG chewable tablet        Start medrol dose pack. Advised patient to take each day's allotment of tablets all together in the morning with food to avoid upset stomach and/or insomnia. Take with a full glass of water.   Take zyrtec once daily.  Take benadryl before bed.   Use fragrance free and sensitive skin products.     Patient Instructions       Follow up with PCP in 3-5 days.  Proceed to  ER if symptoms worsen.    If tests have been performed at [facility], our office will contact you with results if changes need to be made to the care plan discussed with you at the visit.  [facility]    Chief Complaint     Chief Complaint   Patient presents with    Rash     Started today   Bilateral arm redness / rash started spontaneously   OTC topical cortisone cream         History of Present Illness       Andmarcos presents for evaluation of right forearm rash that started 2 hours ago. She reports she was sitting in her recliner chair watching a movie when she started to having a rash on her right forearm. The rash became itchy, so she scratched it, making it worse. She applied hydrocortisone cream to the area just before coming to urgent care. While waiting to be seen the rash went down significantly, leaving only the trace of linear scratches on her forearm.   Patient produces a photo on her cell phone, which shows linear urticaria on the right forearm.   Reports sensitive skin at baseline and uses fragrance free and sensitive skin products only.  No new lotions, soaps, detergents, clothing.  Normal appetite, activities, bowel habits.   No recent sicknesses.   Denies insect bite, injury.    Rash  Pertinent  "negatives include no cough, diarrhea, fatigue, fever, shortness of breath or vomiting.       Review of Systems   Review of Systems   Constitutional:  Negative for activity change, appetite change, fatigue and fever.   Respiratory:  Negative for cough, shortness of breath and wheezing.    Gastrointestinal:  Negative for abdominal pain, constipation, diarrhea, nausea and vomiting.   Skin:  Positive for rash.         Current Medications     Current Medications[1]    Current Allergies     Allergies as of 07/01/2025    (No Known Allergies)            The following portions of the patient's history were reviewed and updated as appropriate: allergies, current medications, past family history, past medical history, past social history, past surgical history and problem list.     Past Medical History[2]    Past Surgical History[3]    Family History[4]      Medications have been verified.        Objective   /70   Pulse 97   Temp 98 °F (36.7 °C)   Resp 16   Ht 5' 8.5\" (1.74 m)   Wt 68.9 kg (152 lb)   LMP 06/11/2025   SpO2 98%   BMI 22.78 kg/m²   Patient's last menstrual period was 06/11/2025.       Physical Exam     Physical Exam  Vitals and nursing note reviewed.   Constitutional:       General: She is awake.      Appearance: Normal appearance. She is well-developed, well-groomed and normal weight. She is not ill-appearing.   HENT:      Head: Normocephalic.      Right Ear: External ear normal.      Left Ear: External ear normal.      Nose: Nose normal. No nasal deformity.      Mouth/Throat:      Lips: Pink. No lesions.      Mouth: Mucous membranes are moist.     Eyes:      General: Lids are normal.      Conjunctiva/sclera: Conjunctivae normal.      Pupils: Pupils are equal, round, and reactive to light.       Cardiovascular:      Rate and Rhythm: Normal rate and regular rhythm.      Heart sounds: Normal heart sounds. No murmur heard.  Pulmonary:      Effort: Pulmonary effort is normal. No accessory muscle usage, " respiratory distress or retractions.      Breath sounds: Normal breath sounds and air entry. No stridor, decreased air movement or transmitted upper airway sounds. No decreased breath sounds, wheezing, rhonchi or rales.   Abdominal:      General: Bowel sounds are normal.      Palpations: Abdomen is soft.     Musculoskeletal:      Cervical back: Normal range of motion and neck supple.   Lymphadenopathy:      Head:      Right side of head: No submandibular, tonsillar, preauricular or posterior auricular adenopathy.      Left side of head: No submandibular, tonsillar, preauricular or posterior auricular adenopathy.     Skin:     General: Skin is warm and dry.      Capillary Refill: Capillary refill takes less than 2 seconds.      Coloration: Skin is not pale.      Findings: Rash present.      Comments: Right forearm with erythematous skin and slightly raised linear excoriations     Neurological:      Mental Status: She is alert and oriented to person, place, and time.      Comments: CN II-X grossly intact.   Psychiatric:         Speech: Speech normal.         Behavior: Behavior normal. Behavior is cooperative.                        [1]   Current Outpatient Medications:     aspirin 81 mg chewable tablet, Chew 1 tablet (81 mg total) 2 (two) times a day for 14 days, Disp: 28 tablet, Rfl: 0    calcium carbonate (OS-BEKA) 600 MG tablet, Take 600 mg by mouth in the morning., Disp: , Rfl:     cetirizine (ZyrTEC) 10 MG chewable tablet, Chew 1 tablet (10 mg total) daily, Disp: 30 tablet, Rfl: 0    cetirizine (ZyrTEC) 10 mg tablet, Take 10 mg by mouth in the morning., Disp: , Rfl:     diphenhydrAMINE (BENADRYL) 25 mg capsule, Take 25 mg by mouth every 6 (six) hours as needed for allergies, Disp: , Rfl:     fluticasone (FLONASE) 50 mcg/act nasal spray, 2 sprays into each nostril daily, Disp: 16 g, Rfl: 2    Magnesium 250 MG TABS, Take 250 mg by mouth in the morning., Disp: , Rfl:     methylPREDNISolone 4 MG tablet therapy pack,  Use as directed on package, Disp: 1 each, Rfl: 0    multivitamin (THERAGRAN) TABS, Take 1 tablet by mouth in the morning., Disp: , Rfl:     NON FORMULARY, Medical Marijuana, Disp: , Rfl:     SODIUM FLUORIDE, DENTAL GEL, 1.1 % CREA, Apply to teeth daily at bedtime brush daily at bedtime, spit out, don't rinse. Floss. Nothing to eat or drink after., Disp: 51 g, Rfl: 20  [2]   Past Medical History:  Diagnosis Date    Abnormal Pap smear of cervix     GERD (gastroesophageal reflux disease)     Herpes     Psychiatric disorder     depression, anxiety    Recurrent urinary tract infection 03/22/2018   [3]   Past Surgical History:  Procedure Laterality Date    KNEE SURGERY      ORIF TIBIA & FIBULA FRACTURES Right 1/24/2024    Procedure: OPEN REDUCTION W/ INTERNAL FIXATION (ORIF) ANKLE;  Surgeon: Jason Dawson DO;  Location: [facility];  Service: Orthopedics    TUBAL LIGATION     [4]   Family History  Problem Relation Name Age of Onset    No Known Problems Mother      Cancer Father          type unknown    Heart disease Father      Heart attack Father      Stroke Father      Other Father          transplant kidney  or  liver    Drug abuse Sister      No Known Problems Daughter      Heart murmur Son      Colon cancer Maternal Grandmother      Heart disease Maternal Grandfather      Heart attack Maternal Grandfather      Dementia Paternal Grandmother      Lung disease Paternal Grandfather      No Known Problems Paternal Aunt      No Known Problems Paternal Aunt      BRCA2 Positive Neg Hx      BRCA1 Negative Neg Hx      Ovarian cancer Neg Hx      Endometrial cancer Neg Hx      BRCA 1/2 Neg Hx      Breast cancer Neg Hx

## 2025-07-13 ENCOUNTER — HOSPITAL ENCOUNTER (EMERGENCY)
Facility: HOSPITAL | Age: 45
Discharge: HOME | End: 2025-07-14
Payer: COMMERCIAL

## 2025-07-13 DIAGNOSIS — I89.1 LYMPHANGITIS: ICD-10-CM

## 2025-07-13 DIAGNOSIS — L03.90 CELLULITIS: ICD-10-CM
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
## 2025-07-13 LAB
ALBUMIN SERPL BCG-MCNC: 4.5 G/DL (ref 3.5–5)
ALP SERPL-CCNC: 28 U/L (ref 34–104)
ALT SERPL W P-5'-P-CCNC: 16 U/L (ref 7–52)
ANION GAP SERPL CALCULATED.3IONS-SCNC: 11 MMOL/L (ref 4–13)
AST SERPL W P-5'-P-CCNC: 23 U/L (ref 13–39)
BASOPHILS # BLD AUTO: 0.07 THOUSANDS/ÂΜL (ref 0–0.1)
BASOPHILS NFR BLD AUTO: 1 % (ref 0–1)
BILIRUB SERPL-MCNC: 0.26 MG/DL (ref 0.2–1)
BUN SERPL-MCNC: 9 MG/DL (ref 5–25)
CALCIUM SERPL-MCNC: 8.8 MG/DL (ref 8.4–10.2)
CHLORIDE SERPL-SCNC: 105 MMOL/L (ref 96–108)
CO2 SERPL-SCNC: 22 MMOL/L (ref 21–32)
CREAT SERPL-MCNC: 0.75 MG/DL (ref 0.6–1.3)
EOSINOPHIL # BLD AUTO: 0.07 THOUSAND/ÂΜL (ref 0–0.61)
EOSINOPHIL NFR BLD AUTO: 1 % (ref 0–6)
ERYTHROCYTE [DISTWIDTH] IN BLOOD BY AUTOMATED COUNT: 12.4 % (ref 11.6–15.1)
GFR SERPL CREATININE-BSD FRML MDRD: 97 ML/MIN/1.73SQ M
GLUCOSE SERPL-MCNC: 94 MG/DL (ref 65–140)
HCT VFR BLD AUTO: 38.6 % (ref 34.8–46.1)
HGB BLD-MCNC: 13.2 G/DL (ref 11.5–15.4)
IMM GRANULOCYTES # BLD AUTO: 0.03 THOUSAND/UL (ref 0–0.2)
IMM GRANULOCYTES NFR BLD AUTO: 0 % (ref 0–2)
LYMPHOCYTES # BLD AUTO: 2.38 THOUSANDS/ÂΜL (ref 0.6–4.47)
LYMPHOCYTES NFR BLD AUTO: 17 % (ref 14–44)
MCH RBC QN AUTO: 34.1 PG (ref 26.8–34.3)
MCHC RBC AUTO-ENTMCNC: 34.2 G/DL (ref 31.4–37.4)
MCV RBC AUTO: 100 FL (ref 82–98)
MONOCYTES # BLD AUTO: 2.06 THOUSAND/ÂΜL (ref 0.17–1.22)
MONOCYTES NFR BLD AUTO: 15 % (ref 4–12)
NEUTROPHILS # BLD AUTO: 9.48 THOUSANDS/ÂΜL (ref 1.85–7.62)
NEUTS SEG NFR BLD AUTO: 66 % (ref 43–75)
NRBC BLD AUTO-RTO: 0 /100 WBCS
PLATELET # BLD AUTO: 230 THOUSANDS/UL (ref 149–390)
PMV BLD AUTO: 9.5 FL (ref 8.9–12.7)
POTASSIUM SERPL-SCNC: 3.9 MMOL/L (ref 3.5–5.3)
PROCALCITONIN SERPL-MCNC: <0.05 NG/ML
PROT SERPL-MCNC: 7.2 G/DL (ref 6.4–8.4)
RBC # BLD AUTO: 3.87 MILLION/UL (ref 3.81–5.12)
SODIUM SERPL-SCNC: 138 MMOL/L (ref 135–147)
WBC # BLD AUTO: 14.09 THOUSAND/UL (ref 4.31–10.16)

## 2025-07-13 PROCEDURE — 99285 EMERGENCY DEPT VISIT HI MDM: CPT

## 2025-07-13 PROCEDURE — 36415 COLL VENOUS BLD VENIPUNCTURE: CPT

## 2025-07-13 PROCEDURE — 99284 EMERGENCY DEPT VISIT MOD MDM: CPT

## 2025-07-13 PROCEDURE — 90471 IMMUNIZATION ADMIN: CPT

## 2025-07-13 PROCEDURE — 85025 COMPLETE CBC W/AUTO DIFF WBC: CPT

## 2025-07-13 PROCEDURE — 96365 THER/PROPH/DIAG IV INF INIT: CPT

## 2025-07-13 PROCEDURE — 96375 TX/PRO/DX INJ NEW DRUG ADDON: CPT

## 2025-07-13 PROCEDURE — 84145 PROCALCITONIN (PCT): CPT

## 2025-07-13 PROCEDURE — 80053 COMPREHEN METABOLIC PANEL: CPT

## 2025-07-13 PROCEDURE — 87040 BLOOD CULTURE FOR BACTERIA: CPT

## 2025-07-13 RX ORDER — CEFTRIAXONE 1 G/50ML
1000 INJECTION, SOLUTION INTRAVENOUS ONCE
Status: COMPLETED | OUTPATIENT
Start: 2025-07-13 | End: 2025-07-13

## 2025-07-13 RX ORDER — HYDROMORPHONE HCL/PF 1 MG/ML
0.5 SYRINGE (ML) INJECTION ONCE
Status: COMPLETED | OUTPATIENT
Start: 2025-07-13 | End: 2025-07-13

## 2025-07-13 RX ADMIN — CEFTRIAXONE 1000 MG: 1 INJECTION, SOLUTION INTRAVENOUS at 22:59

## 2025-07-13 RX ADMIN — HYDROMORPHONE HYDROCHLORIDE 0.5 MG: 1 INJECTION, SOLUTION INTRAMUSCULAR; INTRAVENOUS; SUBCUTANEOUS at 22:54

## 2025-07-13 NOTE — Clinical Note
Chaka Stiles was seen and treated in our emergency department on 7/13/2025.                Diagnosis: lymphangitis    Andraleen  .    She may return on this date: 07/16/2025         If you have any questions or concerns, please don't hesitate to call.      Mo Rocha MD    ______________________________           _______________          _______________  Hospital Representative                              Date                                Time

## 2025-07-14 ENCOUNTER — APPOINTMENT (EMERGENCY)
Dept: CT IMAGING | Facility: HOSPITAL | Age: 45
End: 2025-07-14
Payer: COMMERCIAL

## 2025-07-14 VITALS
RESPIRATION RATE: 20 BRPM | TEMPERATURE: 100.4 F | HEART RATE: 85 BPM | WEIGHT: 154.54 LBS | DIASTOLIC BLOOD PRESSURE: 59 MMHG | OXYGEN SATURATION: 95 % | SYSTOLIC BLOOD PRESSURE: 100 MMHG | BODY MASS INDEX: 23.16 KG/M2

## 2025-07-14 PROCEDURE — 90715 TDAP VACCINE 7 YRS/> IM: CPT

## 2025-07-14 PROCEDURE — 73201 CT UPPER EXTREMITY W/DYE: CPT

## 2025-07-14 RX ORDER — OXYCODONE HYDROCHLORIDE 5 MG/1
5 TABLET ORAL EVERY 4 HOURS PRN
Qty: 10 TABLET | Refills: 0 | Status: SHIPPED | OUTPATIENT
Start: 2025-07-14 | End: 2025-07-24

## 2025-07-14 RX ORDER — IBUPROFEN 800 MG/1
800 TABLET, FILM COATED ORAL 3 TIMES DAILY
Qty: 21 TABLET | Refills: 0 | Status: SHIPPED | OUTPATIENT
Start: 2025-07-14

## 2025-07-14 RX ORDER — OXYCODONE HYDROCHLORIDE 5 MG/1
5 TABLET ORAL ONCE
Refills: 0 | Status: COMPLETED | OUTPATIENT
Start: 2025-07-14 | End: 2025-07-14

## 2025-07-14 RX ADMIN — IOHEXOL 100 ML: 350 INJECTION, SOLUTION INTRAVENOUS at 00:26

## 2025-07-14 RX ADMIN — OXYCODONE HYDROCHLORIDE 5 MG: 5 TABLET ORAL at 00:48

## 2025-07-14 RX ADMIN — TETANUS TOXOID, REDUCED DIPHTHERIA TOXOID AND ACELLULAR PERTUSSIS VACCINE, ADSORBED 0.5 ML: 5; 2.5; 8; 8; 2.5 SUSPENSION INTRAMUSCULAR at 01:07

## 2025-07-14 NOTE — ED PROVIDER NOTES
Time reflects when diagnosis was documented in both MDM as applicable and the Disposition within this note       Time User Action Codes Description Comment    7/14/2025  2:25 AM Mo Rocha [W55.01XA] Cat bite     7/14/2025  2:25 AM Mo Rocha [I89.1] Lymphangitis     7/14/2025  2:25 AM Mo Rocha [L03.90] Cellulitis           ED Disposition       ED Disposition   Discharge    Condition   Stable    Date/Time   Mon Jul 14, 2025  2:25 AM    Comment   Chaka Stiles discharge to home/self care.                   Assessment & Plan       Medical Decision Making  Medical complexity: 45-year-old female presenting with significant redness, swelling, and pain with range of motion with wrist flexion, extension, and pronation/supination after being bit by her cat yesterday.  There is redness spreading up her arm and clinically she certainly has lymphangitis with cellulitis which appears nonpurulent at this time.  Questioning possible flexor tenosynovitis of the flexor tendons of the arm as patient has significant swelling and pain in the carpal tunnel and is also demonstrating a preference for flexed position of the wrist.  She is able to extend at the wrist however and does not have severe pain with percussion of the flexor tendon more proximally in the arm.  Will evaluate further with CT scan of the right upper extremity with contrast to visualize the tendons and look for fluid and enhancement around the flexor tendons.    Reassessment/disposition: Thankfully, the patient has not had any significant extension of the redness up her arm in the 4 hours that she has been here in the emergency department.  Her CT scan shows extensive cellulitis around the subcutaneous tissue but does not demonstrate any significant deep space infection, or enhancement around the flexor tendons.  This was a virtual radiology report, will await final read from radiology sometime today.  Patient maintained stable  examination and has pain relieved whenever she has her oxycodone.  Will start her on a course of Augmentin for her lymphangitis, and cellulitis from her cat bite.  Will have patient watch the area closely for significant worsening and if she has significant worsening will come back to the emergency department immediately especially if she is having fevers, night sweats at home starts to have purulent drainage from the area as this could indicate more of an abscess.  No indication for rabies prophylaxis as cat is fully vaccinated and is able to be watched for the next few weeks.  Therefore patient is amenable to the plan for discharge with oral antibiotics and close monitoring at home.  She understands that if she were to worsen significantly she will come back to the emergency department immediately.    Amount and/or Complexity of Data Reviewed  Labs: ordered. Decision-making details documented in ED Course.  Radiology: ordered.    Risk  Prescription drug management.        ED Course as of 07/14/25 0457   Sun Jul 13, 2025   2332 WBC(!): 14.09       Medications   cefTRIAXone (ROCEPHIN) IVPB (premix in dextrose) 1,000 mg 50 mL (0 mg Intravenous Stopped 7/13/25 2329)   HYDROmorphone (DILAUDID) injection 0.5 mg (0.5 mg Intravenous Given 7/13/25 2254)   iohexol (OMNIPAQUE) 350 MG/ML injection (MULTI-DOSE) 100 mL (100 mL Intravenous Given 7/14/25 0026)   oxyCODONE (ROXICODONE) IR tablet 5 mg (5 mg Oral Given 7/14/25 0048)   tetanus-diphtheria-acellular pertussis (BOOSTRIX) IM injection 0.5 mL (0.5 mL Intramuscular Given 7/14/25 0107)       ED Risk Strat Scores                    No data recorded        SBIRT 22yo+      Flowsheet Row Most Recent Value   Initial Alcohol Screen: US AUDIT-C     1. How often do you have a drink containing alcohol? 4 Filed at: 07/13/2025 2228   2. How many drinks containing alcohol do you have on a typical day you are drinking?  2 Filed at: 07/13/2025 2228   3b. FEMALE Any Age, or MALE 65+: How  often do you have 4 or more drinks on one occassion? 0 Filed at: 07/13/2025 2228   Audit-C Score 6 Filed at: 07/13/2025 2228   MAGUE: How many times in the past year have you...    Used an illegal drug or used a prescription medication for non-medical reasons? Never Filed at: 07/13/2025 2228                            History of Present Illness       Chief Complaint   Patient presents with    Animal Bite     Patient presents to ED from home w/c/o cat bite/scratch RUE last night and redness/swelling progressively worse, streaking present, it was patient's cat that bit/scratched her and she reports it is fully vaccinated       Past Medical History[1]   Past Surgical History[2]   Family History[3]   Social History[4]   E-Cigarette/Vaping    E-Cigarette Use Never User       E-Cigarette/Vaping Substances    Nicotine No     THC No     CBD No     Flavoring No     Other No     Unknown No       I have reviewed and agree with the history as documented.     This is a 45-year-old female who presents to the emergency department with redness on her right wrist and now spreading up her right arm towards her right bicep as well as significant pain around her right wrist.  She is also having fever, chills, and worsening pain in her right wrist.  Patient was bit by her cat last night about 24 hours ago and states that over the course of the last 24 hours she has had expanding redness and warmth going up her arm.  Patient denies any history of immunosuppression or opportunistic infection.  She is right-hand dominant.  She states that she is now having some tingling sensations in her hand since the swelling increased to this afternoon and into the evening.        Review of Systems   Constitutional:  Positive for chills, fatigue and fever.   HENT:  Negative for ear pain and sore throat.    Eyes:  Negative for pain and visual disturbance.   Respiratory:  Negative for cough and shortness of breath.    Cardiovascular:  Negative for chest  pain and palpitations.   Gastrointestinal:  Negative for abdominal pain and vomiting.   Genitourinary:  Negative for dysuria and hematuria.   Musculoskeletal:  Positive for arthralgias. Negative for back pain.   Skin:  Positive for color change and wound. Negative for rash.   Neurological:  Negative for seizures and syncope.   All other systems reviewed and are negative.          Objective       ED Triage Vitals [07/13/25 2225]   Temperature Pulse Blood Pressure Respirations SpO2 Patient Position - Orthostatic VS   100.4 °F (38 °C) 94 117/74 18 98 % --      Temp Source Heart Rate Source BP Location FiO2 (%) Pain Score    Temporal -- -- -- 8      Vitals      Date and Time Temp Pulse SpO2 Resp BP Pain Score FACES Pain Rating User   07/14/25 0200 -- 85 95 % 20 100/59 -- -- AK   07/14/25 0130 -- -- -- -- -- 8 -- AK   07/14/25 0100 -- 88 98 % 20 108/74 -- -- AK   07/14/25 0048 -- -- -- -- -- 8 -- AK   07/14/25 0030 -- 83 94 % 20 116/68 -- -- AK   07/13/25 2330 -- -- -- -- -- 8 -- AK   07/13/25 2254 -- -- -- -- -- 8 -- AK   07/13/25 2227 -- -- 98 % -- -- -- --    07/13/25 2225 100.4 °F (38 °C) 94 98 % 18 117/74 8 --             Physical Exam  Vitals and nursing note reviewed.   Constitutional:       General: She is not in acute distress.     Appearance: She is well-developed and normal weight.   HENT:      Head: Normocephalic and atraumatic.      Right Ear: External ear normal.      Left Ear: External ear normal.      Nose: Nose normal. No congestion or rhinorrhea.      Mouth/Throat:      Mouth: Mucous membranes are moist.      Pharynx: Oropharynx is clear. No oropharyngeal exudate or posterior oropharyngeal erythema.     Eyes:      General: No scleral icterus.     Extraocular Movements: Extraocular movements intact.      Conjunctiva/sclera: Conjunctivae normal.      Pupils: Pupils are equal, round, and reactive to light.       Cardiovascular:      Rate and Rhythm: Normal rate.      Pulses: Normal pulses.      Heart  sounds: No murmur heard.  Pulmonary:      Effort: Pulmonary effort is normal. No respiratory distress.   Abdominal:      General: Abdomen is flat. There is no distension.      Palpations: Abdomen is soft.      Tenderness: There is no abdominal tenderness.     Musculoskeletal:         General: Swelling and tenderness present.      Right wrist: Swelling and tenderness present. Decreased range of motion. Normal pulse.      Cervical back: Normal range of motion. No rigidity.      Right lower leg: No edema.      Left lower leg: No edema.      Comments: Patient does note significant pain with ranging the wrist especially on the medial aspect of the arm near the carpal tunnel.  She is able with encouragement to extend at the wrist and to flex at the wrist.  She has decent handgrip strength but with full handgrip does endorse significant pain in the carpal tunnel area.  She endorses numbness of the distal digits, but still has sensory intact in this area but with paresthesia.  Good cap refill of the distal fingertips.  Median, ulnar, and radial nerve distribution motor function is intact.     Skin:     General: Skin is warm and dry.      Capillary Refill: Capillary refill takes less than 2 seconds.      Coloration: Skin is not jaundiced.      Findings: No rash.     Neurological:      General: No focal deficit present.      Mental Status: She is alert and oriented to person, place, and time. Mental status is at baseline.     Psychiatric:         Mood and Affect: Mood normal.         Behavior: Behavior normal.         Results Reviewed       Procedure Component Value Units Date/Time    Procalcitonin [[id number]]  (Normal) Collected: 07/13/25 2253    Lab Status: Final result Specimen: Blood from Arm, Left Updated: 07/13/25 2332     Procalcitonin <0.05 ng/ml     Comprehensive metabolic panel [[id number]]  (Abnormal) Collected: 07/13/25 2253    Lab Status: Final result Specimen: Blood from Arm, Left Updated: 07/13/25 1963      Sodium 138 mmol/L      Potassium 3.9 mmol/L      Chloride 105 mmol/L      CO2 22 mmol/L      ANION GAP 11 mmol/L      BUN 9 mg/dL      Creatinine 0.75 mg/dL      Glucose 94 mg/dL      Calcium 8.8 mg/dL      AST 23 U/L      ALT 16 U/L      Alkaline Phosphatase 28 U/L      Total Protein 7.2 g/dL      Albumin 4.5 g/dL      Total Bilirubin 0.26 mg/dL      eGFR 97 ml/min/1.73sq m     Narrative:      National Kid[employer] Foundation guidelines for Chronic Kidney Disease (CKD):     Stage 1 with normal or high GFR (GFR > 90 mL/min/1.73 square meters)    Stage 2 Mild CKD (GFR = 60-89 mL/min/1.73 square meters)    Stage 3A Moderate CKD (GFR = 45-59 mL/min/1.73 square meters)    Stage 3B Moderate CKD (GFR = 30-44 mL/min/1.73 square meters)    Stage 4 Severe CKD (GFR = 15-29 mL/min/1.73 square meters)    Stage 5 End Stage CKD (GFR <15 mL/min/1.73 square meters)  Note: GFR calculation is accurate only with a steady state creatinine    CBC and differential [[id number]]  (Abnormal) Collected: 07/13/25 2253    Lab Status: Final result Specimen: Blood from Arm, Left Updated: 07/13/25 2307     WBC 14.09 Thousand/uL      RBC 3.87 Million/uL      Hemoglobin 13.2 g/dL      Hematocrit 38.6 %       fL      MCH 34.1 pg      MCHC 34.2 g/dL      RDW 12.4 %      MPV 9.5 fL      Platelets 230 Thousands/uL      nRBC 0 /100 WBCs      Segmented % 66 %      Immature Grans % 0 %      Lymphocytes % 17 %      Monocytes % 15 %      Eosinophils Relative 1 %      Basophils Relative 1 %      Absolute Neutrophils 9.48 Thousands/µL      Absolute Immature Grans 0.03 Thousand/uL      Absolute Lymphocytes 2.38 Thousands/µL      Absolute Monocytes 2.06 Thousand/µL      Eosinophils Absolute 0.07 Thousand/µL      Basophils Absolute 0.07 Thousands/µL     Blood culture #1 [[id number]] Collected: 07/13/25 2259    Lab Status: In process Specimen: Blood from Arm, Left Updated: 07/13/25 2305    Blood culture #2 [[id number]] Collected: 07/13/25 2253    Lab  Status: In process Specimen: Blood from Arm, Left Updated: 25 2918            CT upper extremity w contrast right    (Results Pending)       Procedures    ED Medication and Procedure Management   Prior to Admission Medications   Prescriptions Last Dose Informant Patient Reported? Taking?   Magnesium 250 MG TABS   Yes No   Sig: Take 250 mg by mouth in the morning.   NON FORMULARY   Yes No   Sig: Medical Marijuana   SODIUM FLUORIDE, DENTAL GEL, 1.1 % CREA   No No   Sig: Apply to teeth daily at bedtime brush daily at bedtime, spit out, don't rinse. Floss. Nothing to eat or drink after.   aspirin 81 mg chewable tablet   No No   Sig: Chew 1 tablet (81 mg total) 2 (two) times a day for 14 days   calcium carbonate (OS-BEKA) 600 MG tablet   Yes No   Sig: Take 600 mg by mouth in the morning.   cetirizine (ZyrTEC) 10 MG chewable tablet   No No   Sig: Chew 1 tablet (10 mg total) daily   cetirizine (ZyrTEC) 10 mg tablet   Yes No   Sig: Take 10 mg by mouth in the morning.   diphenhydrAMINE (BENADRYL) 25 mg capsule   Yes No   Sig: Take 25 mg by mouth every 6 (six) hours as needed for allergies   fluticasone (FLONASE) 50 mcg/act nasal spray   No No   Si sprays into each nostril daily   methylPREDNISolone 4 MG tablet therapy pack   No No   Sig: Use as directed on package   multivitamin (THERAGRAN) TABS   Yes No   Sig: Take 1 tablet by mouth in the morning.      Facility-Administered Medications: None     Discharge Medication List as of 2025  2:28 AM        START taking these medications    Details   amoxicillin-clavulanate (AUGMENTIN) 875-125 mg per tablet Take 1 tablet by mouth every 12 (twelve) hours for 10 days, Starting Mon 2025, Until Thu 2025, Normal      ibuprofen (MOTRIN) 800 mg tablet Take 1 tablet (800 mg total) by mouth 3 (three) times a day, Starting Mon 2025, Normal      oxyCODONE (Roxicodone) 5 immediate release tablet Take 1 tablet (5 mg total) by mouth every 4 (four) hours as needed for  moderate pain or severe pain for up to 10 days Max Daily Amount: 30 mg, Starting Mon 7/14/2025, Until Thu 7/24/2025 at 2359, Normal           CONTINUE these medications which have NOT CHANGED    Details   aspirin 81 mg chewable tablet Chew 1 tablet (81 mg total) 2 (two) times a day for 14 days, Starting Wed 1/24/2024, Until Tue 7/1/2025, Normal      calcium carbonate (OS-BEKA) 600 MG tablet Take 600 mg by mouth in the morning., Historical Med      cetirizine (ZyrTEC) 10 MG chewable tablet Chew 1 tablet (10 mg total) daily, Starting Tue 7/1/2025, Normal      cetirizine (ZyrTEC) 10 mg tablet Take 10 mg by mouth in the morning., Historical Med      diphenhydrAMINE (BENADRYL) 25 mg capsule Take 25 mg by mouth every 6 (six) hours as needed for allergies, Historical Med      fluticasone (FLONASE) 50 mcg/act nasal spray 2 sprays into each nostril daily, Starting Tue 12/31/2024, Normal      Magnesium 250 MG TABS Take 250 mg by mouth in the morning., Historical Med      methylPREDNISolone 4 MG tablet therapy pack Use as directed on package, Normal      multivitamin (THERAGRAN) TABS Take 1 tablet by mouth in the morning., Historical Med      NON FORMULARY Medical Marijuana, Historical Med      SODIUM FLUORIDE, DENTAL GEL, 1.1 % CREA Apply to teeth daily at bedtime brush daily at bedtime, spit out, don't rinse. Floss. Nothing to eat or drink after., Starting Thu 9/26/2024, Normal           No discharge procedures on file.  ED SEPSIS DOCUMENTATION   Time reflects when diagnosis was documented in both MDM as applicable and the Disposition within this note       Time User Action Codes Description Comment    7/14/2025  2:25 AM Mo Rocha [W55.01XA] Cat bite     7/14/2025  2:25 AM Mo Rocha [I89.1] Lymphangitis     7/14/2025  2:25 AM Mo Rocha [L03.90] Cellulitis                      [1]   Past Medical History:  Diagnosis Date    Abnormal Pap smear of cervix     GERD (gastroesophageal reflux disease)      Herpes     Psychiatric disorder     depression, anxiety    Recurrent urinary tract infection 03/22/2018   [2]   Past Surgical History:  Procedure Laterality Date    KNEE SURGERY      ORIF TIBIA & FIBULA FRACTURES Right 1/24/2024    Procedure: OPEN REDUCTION W/ INTERNAL FIXATION (ORIF) ANKLE;  Surgeon: Jason Dawson DO;  Location: [facility];  Service: Orthopedics    TUBAL LIGATION     [3]   Family History  Problem Relation Name Age of Onset    No Known Problems Mother      Cancer Father          type unknown    Heart disease Father      Heart attack Father      Stroke Father      Other Father          transplant kidney  or  liver    Drug abuse Sister      No Known Problems Daughter      Heart murmur Son      Colon cancer Maternal Grandmother      Heart disease Maternal Grandfather      Heart attack Maternal Grandfather      Dementia Paternal Grandmother      Lung disease Paternal Grandfather      No Known Problems Paternal Aunt      No Known Problems Paternal Aunt      BRCA2 Positive Neg Hx      BRCA1 Negative Neg Hx      Ovarian cancer Neg Hx      Endometrial cancer Neg Hx      BRCA 1/2 Neg Hx      Breast cancer Neg Hx     [4]   Social History  Tobacco Use    Smoking status: Some Days     Current packs/day: 0.25     Average packs/day: 0.3 packs/day for 1.1 years (0.3 ttl pk-yrs)     Types: Cigarettes     Start date: 6/1/2024    Smokeless tobacco: Never   Vaping Use    Vaping status: Never Used   Substance Use Topics    Alcohol use: Yes     Alcohol/week: 3.0 standard drinks of alcohol     Types: 3 Standard drinks or equivalent per week     Comment: socially    Drug use: Yes     Types: Marijuana     Comment: medical marijuana - daily        Mo Rocha MD  07/14/25 0454

## 2025-07-19 ENCOUNTER — OFFICE VISIT (OUTPATIENT)
Dept: URGENT CARE | Facility: MEDICAL CENTER | Age: 45
End: 2025-07-19
Payer: COMMERCIAL

## 2025-07-19 VITALS
OXYGEN SATURATION: 96 % | HEART RATE: 85 BPM | SYSTOLIC BLOOD PRESSURE: 114 MMHG | DIASTOLIC BLOOD PRESSURE: 88 MMHG | RESPIRATION RATE: 20 BRPM | TEMPERATURE: 97.4 F
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
## 2025-07-19 DIAGNOSIS — L08.9: Primary | ICD-10-CM

## 2025-07-19 DIAGNOSIS — S61.551S: Primary | ICD-10-CM

## 2025-07-19 LAB
BACTERIA BLD CULT: NORMAL
BACTERIA BLD CULT: NORMAL

## 2025-07-19 PROCEDURE — S9088 SERVICES PROVIDED IN URGENT: HCPCS | Performed by: PHYSICIAN ASSISTANT

## 2025-07-19 PROCEDURE — 99213 OFFICE O/P EST LOW 20 MIN: CPT | Performed by: PHYSICIAN ASSISTANT

## 2025-07-19 RX ORDER — CIPROFLOXACIN 500 MG/1
500 TABLET, FILM COATED ORAL EVERY 12 HOURS SCHEDULED
Qty: 14 TABLET | Refills: 0 | Status: SHIPPED | OUTPATIENT
Start: 2025-07-19 | End: 2025-07-26

## 2025-07-19 RX ORDER — CLINDAMYCIN HYDROCHLORIDE 300 MG/1
300 CAPSULE ORAL 3 TIMES DAILY
Qty: 21 CAPSULE | Refills: 0 | Status: SHIPPED | OUTPATIENT
Start: 2025-07-19 | End: 2025-07-26

## 2025-07-19 NOTE — PATIENT INSTRUCTIONS
..ELEVATED RISK FOR SEPSIS: Jocelyn Godfrey    Sepsis Exclusions :Exclusions?: None applicable    Recent Labs   Lab 01/01/24  1937 01/01/24  0309 12/27/23  0618 12/26/23  0719   LACTA 2.1*  --   --   --    CREATININE 2.13* 1.74*   < > 0.92   BILIRUBIN 0.5  --   --  0.3   WBC 9.2 12.2*   < > 8.1   HGB 11.4* 10.8*   < > 13.0   HCT 36.1* 34.7*   < > 40.4   SEG 74 76   < > 62    203   < > 222    < > = values in this interval not displayed.     Vitals:    01/01/24 2035 01/01/24 2040 01/01/24 2044   Temp:      Pulse:  (!) 125 82   Resp:  (!) 24    SpO2:  93% 91%   BP: (!) 168/86  (!) 171/81       Severe Sepsis 3 Hour Bundle:    Lactic Acid:    Initial Lactate Initial Lactate greater than 2. Repeat completed.  Repeat Lactic Acid:  Time due: 2237    Blood Cultures:  Two blood culture/s obtained before antibiotics started: Yes    If only 1 culture obtained, provider notified: n/a    Obtained two  Lab Results   Component Value Date    BLC No Growth 5 Days. 12/11/2023       Antibiotics:  Antibiotics started within 3 hours of time zero: No (pt was in MRI)   If 2 or 3 antibiotics are ordered, broad spectrum antibiotic given first: yes  Antibiotics Started: Cefepime started 2349  Vancomycin started 0020    IV Fluids:   Is Lactate < 4 and normotensive? Yes Were fluids given? Yes  Is Lactate >/= 4 or Hypotensive (SBP<90, MAP<65 x2 in 3hrs) Present? No   Were 30mL/kg administered? No. Give 500mL.      Severe Sepsis 6 Hour Bundle:    Repeat lactic acid ordered after IV fluids completed and within a maximum of 6 hours from time zero: No      Septic Shock:  If lactate is >/= to 4 OR patient meets persistent hypotension (two consecutive SBP's <90mmHg or MAP < 65mmHg and/or SBP drop of greater than 40mmHg) in the 1 hour following 30ml/kg crystalloid fluid administration, consider septic shock.    Vasopressors initiated? Sepsis Vassopressors: No persistent hypotension at this time    Was the volume status or tissue perfusion  Stop Augmentin  Start clindamycin and Cipro.  Eat activity yogurt twice daily and take probiotics to replace all the good bacteria in your gut.  Follow-up with family doctor  If symptoms worsen go to the emergency room for further evaluation.   reassessment completed? No    Were two blood pressure measurements recorded within 1 hour after IVF bolus was completed? Yes    Additional comments:

## 2025-07-19 NOTE — PROGRESS NOTES
[facility]  Name: Chaka Stiles      : 1980      MRN: [id number]  Encounter Provider: Esmer Fraser PA-C  Encounter Date: 2025   Encounter department: [facility] [place]  :  Assessment & Plan  Infected cat bite of wrist, right, sequela    Orders:    clindamycin (CLEOCIN) 300 MG capsule; Take 1 capsule (300 mg total) by mouth 3 (three) times a day for 7 days    ciprofloxacin (CIPRO) 500 mg tablet; Take 1 tablet (500 mg total) by mouth every 12 (twelve) hours for 7 days        Patient Instructions  Stop Augmentin  Start clindamycin and Cipro.  Eat activity yogurt twice daily and take probiotics to replace all the good bacteria in your gut.  Follow-up with family doctor  If symptoms worsen go to the emergency room for further evaluation.    Follow up with PCP in 3-5 days.  Proceed to  ER if symptoms worsen.    If tests are performed, our office will contact you with results only if changes need to made to the care plan discussed with you at the visit. You can review your full results on [facility].    Chief Complaint:   Chief Complaint   Patient presents with    Animal Bite     Was bit by cat last week. Was seen in ED for same after infection. Placed on antibiotics and told to see PCP within 2 days. Unable to get appointment     History of Present Illness   Patient was seen in the emergency room on  where she had Rocephin IV and discharged on Augmentin.  She was to follow-up with her PCP in 2 days, however she is unable to obtain an appointment till .  Her concern is that the bite area is swollen which was not there previously.  The lymphatic streaking has improved and it is to approximately mid forearm.  Patient states initially was 2 her right upper arm.  She underwent CAT scan which was negative.  Patient has clear drainage from the bite wound.  Denies fevers.  ER records reviewed.          Review of Systems   Constitutional:  Negative for chills and  fever.   Skin:  Positive for wound.     Past Medical History   Past Medical History[1]  Past Surgical History[2]  Family History[3]  she reports that she has been smoking cigarettes. She started smoking about 13 months ago. She has a 0.3 pack-year smoking history. She has never used smokeless tobacco. She reports current alcohol use of about 3.0 standard drinks of alcohol per week. She reports current drug use. Drug: Marijuana.  Current Outpatient Medications   Medication Instructions    aspirin 81 mg, Oral, 2 times daily    calcium carbonate (OS-BEKA) 600 mg, Daily    cetirizine (ZYRTEC) 10 mg, Daily    cetirizine (ZYRTEC) 10 mg, Oral, Daily    ciprofloxacin (CIPRO) 500 mg, Oral, Every 12 hours scheduled    clindamycin (CLEOCIN) 300 mg, Oral, 3 times daily    diphenhydrAMINE (BENADRYL) 25 mg, Every 6 hours PRN    fluticasone (FLONASE) 50 mcg/act nasal spray 2 sprays, Nasal, Daily    ibuprofen (MOTRIN) 800 mg, Oral, 3 times daily    Magnesium 250 mg, Daily    methylPREDNISolone 4 MG tablet therapy pack Use as directed on package    multivitamin (THERAGRAN) TABS 1 tablet, Daily    NON FORMULARY Medical Marijuana    oxyCODONE (ROXICODONE) 5 mg, Oral, Every 4 hours PRN    SODIUM FLUORIDE, DENTAL GEL, 1.1 % CREA Dental, Daily at bedtime, brush daily at bedtime, spit out, don't rinse. Floss. Nothing to eat or drink after.   Allergies[4]     Objective   /88   Pulse 85   Temp (!) 97.4 °F (36.3 °C)   Resp 20   LMP 07/03/2025   SpO2 96%      Physical Exam  Vitals and nursing note reviewed.   Constitutional:       Appearance: Normal appearance.   HENT:      Head: Normocephalic and atraumatic.     Cardiovascular:      Rate and Rhythm: Normal rate.   Pulmonary:      Effort: Pulmonary effort is normal.     Musculoskeletal:      Comments: Solitary puncture bill volar aspect right wrist with surrounding swelling and erythema.  Faint lymphatic streaking to mid volar forearm.  Full range of motion of wrist and fingers.  No  "expressible purulent drainage.     Skin:     General: Skin is warm.     Neurological:      Mental Status: She is alert.             Portions of the record may have been created with voice recognition software.  Occasional wrong word or \"sound a like\" substitutions may have occurred due to the inherent limitations of voice recognition software.  Read the chart carefully and recognize, using context, where substitutions have occurred.         [1]   Past Medical History:  Diagnosis Date    Abnormal Pap smear of cervix     GERD (gastroesophageal reflux disease)     Herpes     Psychiatric disorder     depression, anxiety    Recurrent urinary tract infection 03/22/2018   [2]   Past Surgical History:  Procedure Laterality Date    KNEE SURGERY      ORIF TIBIA & FIBULA FRACTURES Right 1/24/2024    Procedure: OPEN REDUCTION W/ INTERNAL FIXATION (ORIF) ANKLE;  Surgeon: Jason Dawson DO;  Location: [facility];  Service: Orthopedics    TUBAL LIGATION     [3]   Family History  Problem Relation Name Age of Onset    No Known Problems Mother      Cancer Father          type unknown    Heart disease Father      Heart attack Father      Stroke Father      Other Father          transplant kidney  or  liver    Drug abuse Sister      No Known Problems Daughter      Heart murmur Son      Colon cancer Maternal Grandmother      Heart disease Maternal Grandfather      Heart attack Maternal Grandfather      Dementia Paternal Grandmother      Lung disease Paternal Grandfather      No Known Problems Paternal Aunt      No Known Problems Paternal Aunt      BRCA2 Positive Neg Hx      BRCA1 Negative Neg Hx      Ovarian cancer Neg Hx      Endometrial cancer Neg Hx      BRCA 1/2 Neg Hx      Breast cancer Neg Hx     [4] No Known Allergies    "

## 2025-07-20 ENCOUNTER — APPOINTMENT (EMERGENCY)
Dept: CT IMAGING | Facility: HOSPITAL | Age: 45
End: 2025-07-20
Payer: COMMERCIAL

## 2025-07-20 ENCOUNTER — HOSPITAL ENCOUNTER (EMERGENCY)
Facility: HOSPITAL | Age: 45
Discharge: HOME | End: 2025-07-21
Attending: EMERGENCY MEDICINE | Admitting: EMERGENCY MEDICINE
Payer: COMMERCIAL
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
## 2025-07-20 DIAGNOSIS — L02.414 ABSCESS OF SKIN OF LEFT WRIST: ICD-10-CM

## 2025-07-20 LAB
ALBUMIN SERPL BCG-MCNC: 5 G/DL (ref 3.5–5)
ALP SERPL-CCNC: 34 U/L (ref 34–104)
ALT SERPL W P-5'-P-CCNC: 11 U/L (ref 7–52)
ANION GAP SERPL CALCULATED.3IONS-SCNC: 12 MMOL/L (ref 4–13)
APTT PPP: 25 SECONDS (ref 23–34)
AST SERPL W P-5'-P-CCNC: 16 U/L (ref 13–39)
BASOPHILS # BLD AUTO: 0.1 THOUSANDS/ÂΜL (ref 0–0.1)
BASOPHILS NFR BLD AUTO: 1 % (ref 0–1)
BILIRUB SERPL-MCNC: 0.18 MG/DL (ref 0.2–1)
BUN SERPL-MCNC: 12 MG/DL (ref 5–25)
CALCIUM SERPL-MCNC: 9.7 MG/DL (ref 8.4–10.2)
CHLORIDE SERPL-SCNC: 105 MMOL/L (ref 96–108)
CO2 SERPL-SCNC: 23 MMOL/L (ref 21–32)
CREAT SERPL-MCNC: 0.83 MG/DL (ref 0.6–1.3)
EOSINOPHIL # BLD AUTO: 0.22 THOUSAND/ÂΜL (ref 0–0.61)
EOSINOPHIL NFR BLD AUTO: 2 % (ref 0–6)
ERYTHROCYTE [DISTWIDTH] IN BLOOD BY AUTOMATED COUNT: 12.1 % (ref 11.6–15.1)
GFR SERPL CREATININE-BSD FRML MDRD: 86 ML/MIN/1.73SQ M
GLUCOSE SERPL-MCNC: 82 MG/DL (ref 65–140)
HCT VFR BLD AUTO: 39.9 % (ref 34.8–46.1)
HGB BLD-MCNC: 13.5 G/DL (ref 11.5–15.4)
IMM GRANULOCYTES # BLD AUTO: 0.05 THOUSAND/UL (ref 0–0.2)
IMM GRANULOCYTES NFR BLD AUTO: 1 % (ref 0–2)
INR PPP: 0.91 (ref 0.85–1.19)
LYMPHOCYTES # BLD AUTO: 4.35 THOUSANDS/ÂΜL (ref 0.6–4.47)
LYMPHOCYTES NFR BLD AUTO: 45 % (ref 14–44)
MAGNESIUM SERPL-MCNC: 2 MG/DL (ref 1.9–2.7)
MCH RBC QN AUTO: 33.7 PG (ref 26.8–34.3)
MCHC RBC AUTO-ENTMCNC: 33.8 G/DL (ref 31.4–37.4)
MCV RBC AUTO: 100 FL (ref 82–98)
MONOCYTES # BLD AUTO: 0.59 THOUSAND/ÂΜL (ref 0.17–1.22)
MONOCYTES NFR BLD AUTO: 6 % (ref 4–12)
NEUTROPHILS # BLD AUTO: 4.47 THOUSANDS/ÂΜL (ref 1.85–7.62)
NEUTS SEG NFR BLD AUTO: 45 % (ref 43–75)
NRBC BLD AUTO-RTO: 0 /100 WBCS
PHOSPHATE SERPL-MCNC: 4.1 MG/DL (ref 2.7–4.5)
PLATELET # BLD AUTO: 339 THOUSANDS/UL (ref 149–390)
PMV BLD AUTO: 9.3 FL (ref 8.9–12.7)
POTASSIUM SERPL-SCNC: 3.8 MMOL/L (ref 3.5–5.3)
PROT SERPL-MCNC: 7.7 G/DL (ref 6.4–8.4)
PROTHROMBIN TIME: 12.8 SECONDS (ref 12.3–15)
RBC # BLD AUTO: 4.01 MILLION/UL (ref 3.81–5.12)
SODIUM SERPL-SCNC: 140 MMOL/L (ref 135–147)
WBC # BLD AUTO: 9.78 THOUSAND/UL (ref 4.31–10.16)

## 2025-07-20 PROCEDURE — 99284 EMERGENCY DEPT VISIT MOD MDM: CPT

## 2025-07-20 PROCEDURE — 84100 ASSAY OF PHOSPHORUS: CPT | Performed by: EMERGENCY MEDICINE

## 2025-07-20 PROCEDURE — 85025 COMPLETE CBC W/AUTO DIFF WBC: CPT | Performed by: EMERGENCY MEDICINE

## 2025-07-20 PROCEDURE — 96375 TX/PRO/DX INJ NEW DRUG ADDON: CPT

## 2025-07-20 PROCEDURE — 85610 PROTHROMBIN TIME: CPT | Performed by: EMERGENCY MEDICINE

## 2025-07-20 PROCEDURE — 99285 EMERGENCY DEPT VISIT HI MDM: CPT | Performed by: EMERGENCY MEDICINE

## 2025-07-20 PROCEDURE — 36415 COLL VENOUS BLD VENIPUNCTURE: CPT | Performed by: EMERGENCY MEDICINE

## 2025-07-20 PROCEDURE — 96365 THER/PROPH/DIAG IV INF INIT: CPT

## 2025-07-20 PROCEDURE — 87040 BLOOD CULTURE FOR BACTERIA: CPT | Performed by: EMERGENCY MEDICINE

## 2025-07-20 PROCEDURE — 80053 COMPREHEN METABOLIC PANEL: CPT | Performed by: EMERGENCY MEDICINE

## 2025-07-20 PROCEDURE — 73201 CT UPPER EXTREMITY W/DYE: CPT

## 2025-07-20 PROCEDURE — 83735 ASSAY OF MAGNESIUM: CPT | Performed by: EMERGENCY MEDICINE

## 2025-07-20 PROCEDURE — 85730 THROMBOPLASTIN TIME PARTIAL: CPT | Performed by: EMERGENCY MEDICINE

## 2025-07-20 RX ORDER — LIDOCAINE HYDROCHLORIDE AND EPINEPHRINE 10; 10 MG/ML; UG/ML
10 INJECTION, SOLUTION INFILTRATION; PERINEURAL ONCE
Status: COMPLETED | OUTPATIENT
Start: 2025-07-20 | End: 2025-07-20

## 2025-07-20 RX ORDER — FENTANYL CITRATE 50 UG/ML
50 INJECTION, SOLUTION INTRAMUSCULAR; INTRAVENOUS ONCE
Refills: 0 | Status: COMPLETED | OUTPATIENT
Start: 2025-07-20 | End: 2025-07-20

## 2025-07-20 RX ADMIN — AMPICILLIN SODIUM AND SULBACTAM SODIUM 3 G: 2; 1 INJECTION, POWDER, FOR SOLUTION INTRAMUSCULAR; INTRAVENOUS at 21:48

## 2025-07-20 RX ADMIN — FENTANYL CITRATE 50 MCG: 50 INJECTION INTRAMUSCULAR; INTRAVENOUS at 21:22

## 2025-07-20 RX ADMIN — LIDOCAINE HYDROCHLORIDE,EPINEPHRINE BITARTRATE 10 ML: 10; .01 INJECTION, SOLUTION INFILTRATION; PERINEURAL at 23:46

## 2025-07-20 RX ADMIN — IOHEXOL 100 ML: 350 INJECTION, SOLUTION INTRAVENOUS at 23:01

## 2025-07-20 NOTE — Clinical Note
Chaka Stiles was seen and treated in our emergency department on 7/20/2025.                Diagnosis:     Andralselina  .    She may return on this date:     Excused on 7/21/25     If you have any questions or concerns, please don't hesitate to call.      Mo Watts MD    ______________________________           _______________          _______________  Hospital Representative                              Date                                Time

## 2025-07-21 ENCOUNTER — TELEPHONE (OUTPATIENT)
Dept: OBGYN CLINIC | Facility: CLINIC | Age: 45
End: 2025-07-21

## 2025-07-21 VITALS
TEMPERATURE: 97.9 F | RESPIRATION RATE: 16 BRPM | OXYGEN SATURATION: 97 % | HEART RATE: 76 BPM | DIASTOLIC BLOOD PRESSURE: 77 MMHG | SYSTOLIC BLOOD PRESSURE: 113 MMHG

## 2025-07-21 NOTE — TELEPHONE ENCOUNTER
Hello,    Please advise if a forced appointment can be accommodated for the patient:    Call back #: [phone]    Insurance: [employer]     Reason for appointment: Patient seen [facility]  for carmine bite .   Patient seen at [facility] ED 7/20 for carmine bite infection R wrist . Abscess of skin.      Requested doctor and/or location: Dr Fermin  in [place] or [place] or Dr Cote in [place]   Patient is requesting appointment tomorrow pm since she will be off of work.    Please call patient to advise first thing in the morning.     Thank you.

## 2025-07-21 NOTE — ED PROVIDER NOTES
Time reflects when diagnosis was documented in both MDM as applicable and the Disposition within this note       Time User Action Codes Description Comment    7/21/2025 12:08 AM StefanieMo morrison Add [W55.01XA] Cat bite, initial encounter     7/21/2025 12:08 AM Mo Watts Add [L02.414] Abscess of skin of left wrist           ED Disposition       ED Disposition   Discharge    Condition   Stable    Date/Time   Mon Jul 21, 2025 12:34 AM    Comment   Chaka Stiles discharge to home/self care.                   Assessment & Plan       Medical Decision Making  45-year-old female presents for evaluation of persistent infection to the right wrist after being bitten by her cat on July 14.  Patient was evaluated emergency department at that time, CT scan was performed and she was started on antibiotics and discharged home with return precautions.  Patient was reevaluated at urgent care yesterday and additional antibiotics were prescribed due to persistent symptoms including erythema, induration as well as purulent drainage from the wrist as well as worsening swelling of the thumb.  Patient presents today with these worsening symptoms although stating that the lymphangitis has receded from the upper arm.  No fevers since the initial presentation.  No systemic symptoms otherwise.  Patient continues to have pain at the site.  On exam, she is overall well-appearing and not in acute distress.    Check basic labs, CT imaging, antibiotics IV, plan for transfer for orthopedic evaluation.    Amount and/or Complexity of Data Reviewed  Labs: ordered. Decision-making details documented in ED Course.  Radiology: ordered. Decision-making details documented in ED Course.    Risk  Prescription drug management.      2112 hrs.: Contacted orthopedic surgery to discuss possibility of transfer for OR.  Ortho on-call advised discussing with hand surgery on-call.  Plan at this time will be to obtain repeat CT and reassess to  determine appropriate disposition.    2218 hrs.: Patient reassessed.  Pain is relieved.  Reviewed labs.  Pending CT imaging.  Case signed out to overnight physician for disposition.         Medications   ampicillin-sulbactam (UNASYN) 3 g in sodium chloride 0.9 % 100 mL IVPB (0 g Intravenous Stopped 7/20/25 2218)   fentaNYL injection 50 mcg (50 mcg Intravenous Given 7/20/25 2122)   iohexol (OMNIPAQUE) 350 MG/ML injection (MULTI-DOSE) 100 mL (100 mL Intravenous Given 7/20/25 2301)   lidocaine-epinephrine (XYLOCAINE/EPINEPHRINE) 1 %-1:100,000 injection 10 mL (10 mL Infiltration Given by Other 7/20/25 2346)       ED Risk Strat Scores                    No data recorded        SBIRT 20yo+      Flowsheet Row Most Recent Value   Initial Alcohol Screen: US AUDIT-C     1. How often do you have a drink containing alcohol? 0 Filed at: 07/20/2025 2003   2. How many drinks containing alcohol do you have on a typical day you are drinking?  0 Filed at: 07/20/2025 2003   3a. Male UNDER 65: How often do you have five or more drinks on one occasion? 0 Filed at: 07/20/2025 2003   3b. FEMALE Any Age, or MALE 65+: How often do you have 4 or more drinks on one occassion? 0 Filed at: 07/20/2025 2003   Audit-C Score 0 Filed at: 07/20/2025 2003   MAGUE: How many times in the past year have you...    Used an illegal drug or used a prescription medication for non-medical reasons? Never Filed at: 07/20/2025 2003                            History of Present Illness       Chief Complaint   Patient presents with    Cat Bite     Patient was bit by cat last week on right wrist, placed on abx however presents with worsening/draining wound site. Denies fevers at home        Past Medical History[1]   Past Surgical History[2]   Family History[3]   Social History[4]   E-Cigarette/Vaping    E-Cigarette Use Never User       E-Cigarette/Vaping Substances    Nicotine No     THC No     CBD No     Flavoring No     Other No     Unknown No       I have reviewed  and agree with the history as documented.     45-year-old female presents for evaluation of persistent infection to the right wrist after being bitten by her cat on July 14.  Patient was evaluated emergency department at that time, CT scan was performed and she was started on antibiotics and discharged home with return precautions.  Patient was reevaluated at urgent care yesterday and additional antibiotics were prescribed due to persistent symptoms including erythema, induration as well as purulent drainage from the wrist as well as worsening swelling of the thumb.  Patient presents today with these worsening symptoms although stating that the lymphangitis has receded from the upper arm.  No fevers since the initial presentation.  No systemic symptoms otherwise.  Patient continues to have pain at the site.  On exam, she is overall well-appearing and not in acute distress.      Cat Bite  Associated symptoms: no fever, no numbness and no rash        Review of Systems   Constitutional:  Negative for activity change, appetite change, chills, diaphoresis, fatigue and fever.   HENT:  Negative for dental problem, ear pain, sore throat, trouble swallowing and voice change.    Eyes:  Negative for pain and visual disturbance.   Respiratory:  Negative for cough, chest tightness, shortness of breath and wheezing.    Cardiovascular:  Negative for chest pain, palpitations and leg swelling.   Gastrointestinal:  Negative for abdominal pain, anal bleeding, blood in stool, diarrhea, nausea, rectal pain and vomiting.   Endocrine: Negative for polydipsia, polyphagia and polyuria.   Genitourinary:  Negative for difficulty urinating, dysuria, flank pain, frequency, hematuria and urgency.   Musculoskeletal:  Negative for back pain, joint swelling, myalgias, neck pain and neck stiffness.        Cat bite to right wrist, purulent drainage, thumb swelling   Skin:  Negative for pallor, rash and wound.   Neurological:  Negative for dizziness,  facial asymmetry, speech difficulty, weakness, light-headedness, numbness and headaches.   Hematological:  Negative for adenopathy.   Psychiatric/Behavioral:  Negative for agitation. The patient is not nervous/anxious.    All other systems reviewed and are negative.          Objective       ED Triage Vitals [07/20/25 2004]   Temperature Pulse Blood Pressure Respirations SpO2 Patient Position - Orthostatic VS   97.9 °F (36.6 °C) 83 113/77 18 97 % Sitting      Temp Source Heart Rate Source BP Location FiO2 (%) Pain Score    Temporal Monitor Right arm -- 4      Vitals      Date and Time Temp Pulse SpO2 Resp BP Pain Score FACES Pain Rating User   07/21/25 0030 -- 76 97 % 16 -- -- -- CD   07/20/25 2218 -- 85 98 % 16 -- -- -- CD   07/20/25 2144 -- -- -- -- -- 1 -- CP   07/20/25 2122 -- -- -- -- -- 3 -- CP   07/20/25 2004 97.9 °F (36.6 °C) 83 97 % 18 113/77 4 -- CLS            Physical Exam  Vitals and nursing note reviewed.   Constitutional:       General: She is not in acute distress.     Appearance: She is well-developed. She is not ill-appearing, toxic-appearing or diaphoretic.   HENT:      Head: Normocephalic and atraumatic.      Right Ear: External ear normal.      Left Ear: External ear normal.      Nose: Nose normal. No congestion or rhinorrhea.      Mouth/Throat:      Mouth: Mucous membranes are moist.     Eyes:      General: No visual field deficit or scleral icterus.        Right eye: No discharge.         Left eye: No discharge.      Extraocular Movements: Extraocular movements intact.      Conjunctiva/sclera: Conjunctivae normal.      Pupils: Pupils are equal, round, and reactive to light.     Neck:      Thyroid: No thyromegaly.      Vascular: No JVD.      Trachea: No tracheal deviation.     Cardiovascular:      Rate and Rhythm: Normal rate and regular rhythm.      Pulses: Normal pulses.      Heart sounds: Normal heart sounds, S1 normal and S2 normal. No murmur heard.     No friction rub. No gallop.    Pulmonary:      Effort: Pulmonary effort is normal. No accessory muscle usage, respiratory distress or retractions.      Breath sounds: Normal breath sounds and air entry. No stridor or decreased air movement. No decreased breath sounds, wheezing, rhonchi or rales.   Chest:      Chest wall: No tenderness.   Abdominal:      General: There is no distension.      Palpations: Abdomen is soft. There is no mass.      Tenderness: There is no abdominal tenderness. There is no guarding or rebound.      Hernia: No hernia is present.     Musculoskeletal:         General: No swelling or deformity.      Right forearm: Swelling and tenderness present. No edema, deformity, lacerations or bony tenderness.      Right wrist: Decreased range of motion. Normal pulse.        Arms:       Cervical back: Normal range of motion and neck supple. No rigidity.      Right lower leg: No edema.      Left lower leg: No edema.      Comments: Right hand: Swelling and tenderness of the right thumb.  Limited range of motion due to swelling and pain.   Lymphadenopathy:      Cervical: No cervical adenopathy.     Skin:     General: Skin is warm and dry.      Capillary Refill: Capillary refill takes less than 2 seconds.      Coloration: Skin is not jaundiced or pale.      Findings: No bruising, erythema or rash.     Neurological:      General: No focal deficit present.      Mental Status: She is alert and oriented to person, place, and time.      GCS: GCS eye subscore is 4. GCS verbal subscore is 5. GCS motor subscore is 6.      Cranial Nerves: Cranial nerves 2-12 are intact. No cranial nerve deficit, dysarthria or facial asymmetry.      Sensory: Sensation is intact. No sensory deficit.      Motor: Motor function is intact. No weakness, tremor, atrophy, abnormal muscle tone or seizure activity.      Coordination: Coordination is intact. Coordination normal.      Gait: Gait is intact.      Deep Tendon Reflexes: Reflexes are normal and symmetric. Reflexes  normal.     Psychiatric:         Behavior: Behavior normal.                 Results Reviewed       Procedure Component Value Units Date/Time    Blood culture #1 [[id number]] Collected: 07/20/25 2110    Lab Status: Preliminary result Specimen: Blood from Arm, Left Updated: 07/22/25 0801     Blood Culture No Growth at 24 hrs.    Blood culture #2 [[id number]] Collected: 07/20/25 2105    Lab Status: Preliminary result Specimen: Blood from Arm, Left Updated: 07/22/25 0801     Blood Culture No Growth at 24 hrs.    Comprehensive metabolic panel [[id number]]  (Abnormal) Collected: 07/20/25 2105    Lab Status: Final result Specimen: Blood from Arm, Left Updated: 07/20/25 2137     Sodium 140 mmol/L      Potassium 3.8 mmol/L      Chloride 105 mmol/L      CO2 23 mmol/L      ANION GAP 12 mmol/L      BUN 12 mg/dL      Creatinine 0.83 mg/dL      Glucose 82 mg/dL      Calcium 9.7 mg/dL      AST 16 U/L      ALT 11 U/L      Alkaline Phosphatase 34 U/L      Total Protein 7.7 g/dL      Albumin 5.0 g/dL      Total Bilirubin 0.18 mg/dL      eGFR 86 ml/min/1.73sq m     Narrative:      National Kid[employer] Foundation guidelines for Chronic Kidney Disease (CKD):     Stage 1 with normal or high GFR (GFR > 90 mL/min/1.73 square meters)    Stage 2 Mild CKD (GFR = 60-89 mL/min/1.73 square meters)    Stage 3A Moderate CKD (GFR = 45-59 mL/min/1.73 square meters)    Stage 3B Moderate CKD (GFR = 30-44 mL/min/1.73 square meters)    Stage 4 Severe CKD (GFR = 15-29 mL/min/1.73 square meters)    Stage 5 End Stage CKD (GFR <15 mL/min/1.73 square meters)  Note: GFR calculation is accurate only with a steady state creatinine    Phosphorus [[id number]]  (Normal) Collected: 07/20/25 2105    Lab Status: Final result Specimen: Blood from Arm, Left Updated: 07/20/25 2137     Phosphorus 4.1 mg/dL     Magnesium [[id number]]  (Normal) Collected: 07/20/25 2105    Lab Status: Final result Specimen: Blood from Arm, Left Updated: 07/20/25 2137     Magnesium 2.0 mg/dL      Protime-INR [[id number]]  (Normal) Collected: 07/20/25 2105    Lab Status: Final result Specimen: Blood from Arm, Left Updated: 07/20/25 2131     Protime 12.8 seconds      INR 0.91    Narrative:      INR Therapeutic Range    Indication                                             INR Range      Atrial Fibrillation                                               2.0-3.0  Hypercoagulable State                                    2.0.2.3  Left Ventricular Asist Device                            2.0-3.0  Mechanical Heart Valve                                  -    Aortic(with afib, MI, embolism, HF, LA enlargement,    and/or coagulopathy)                                     2.0-3.0 (2.5-3.5)     Mitral                                                             2.5-3.5  Prosthetic/Bioprosthetic Heart Valve               2.0-3.0  Venous thromboembolism (VTE: VT, PE        2.0-3.0    APTT [[id number]]  (Normal) Collected: 07/20/25 2105    Lab Status: Final result Specimen: Blood from Arm, Left Updated: 07/20/25 2131     PTT 25 seconds     CBC and differential [[id number]]  (Abnormal) Collected: 07/20/25 2105    Lab Status: Final result Specimen: Blood from Arm, Left Updated: 07/20/25 2117     WBC 9.78 Thousand/uL      RBC 4.01 Million/uL      Hemoglobin 13.5 g/dL      Hematocrit 39.9 %       fL      MCH 33.7 pg      MCHC 33.8 g/dL      RDW 12.1 %      MPV 9.3 fL      Platelets 339 Thousands/uL      nRBC 0 /100 WBCs      Segmented % 45 %      Immature Grans % 1 %      Lymphocytes % 45 %      Monocytes % 6 %      Eosinophils Relative 2 %      Basophils Relative 1 %      Absolute Neutrophils 4.47 Thousands/µL      Absolute Immature Grans 0.05 Thousand/uL      Absolute Lymphocytes 4.35 Thousands/µL      Absolute Monocytes 0.59 Thousand/µL      Eosinophils Absolute 0.22 Thousand/µL      Basophils Absolute 0.10 Thousands/µL             CT upper extremity w contrast right   Final Interpretation by Jose Lujan DO (07/20  2347)      Small abscess along the volar wrist extending to the flexor carpi ulnaris tendon.      The study was marked in EPIC for immediate notification.      Workstation performed: [id number]             Procedures    ED Medication and Procedure Management   Prior to Admission Medications   Prescriptions Last Dose Informant Patient Reported? Taking?   Magnesium 250 MG TABS 2025  Yes Yes   Sig: Take 250 mg by mouth in the morning.   NON FORMULARY   Yes No   Sig: Medical Marijuana   SODIUM FLUORIDE, DENTAL GEL, 1.1 % CREA   No No   Sig: Apply to teeth daily at bedtime brush daily at bedtime, spit out, don't rinse. Floss. Nothing to eat or drink after.   aspirin 81 mg chewable tablet 2025  No Yes   Sig: Chew 1 tablet (81 mg total) 2 (two) times a day for 14 days   calcium carbonate (OS-BEKA) 600 MG tablet 2025  Yes Yes   Sig: Take 600 mg by mouth in the morning.   cetirizine (ZyrTEC) 10 MG chewable tablet 2025  No Yes   Sig: Chew 1 tablet (10 mg total) daily   cetirizine (ZyrTEC) 10 mg tablet 2025  Yes Yes   Sig: Take 10 mg by mouth in the morning.   ciprofloxacin (CIPRO) 500 mg tablet 2025  No Yes   Sig: Take 1 tablet (500 mg total) by mouth every 12 (twelve) hours for 7 days   clindamycin (CLEOCIN) 300 MG capsule 2025  No Yes   Sig: Take 1 capsule (300 mg total) by mouth 3 (three) times a day for 7 days   diphenhydrAMINE (BENADRYL) 25 mg capsule 2025  Yes Yes   Sig: Take 25 mg by mouth every 6 (six) hours as needed for allergies   fluticasone (FLONASE) 50 mcg/act nasal spray 2025  No Yes   Si sprays into each nostril daily   ibuprofen (MOTRIN) 800 mg tablet Not Taking  No No   Sig: Take 1 tablet (800 mg total) by mouth 3 (three) times a day   Patient not taking: Reported on 2025   methylPREDNISolone 4 MG tablet therapy pack Not Taking  No No   Sig: Use as directed on package   Patient not taking: Reported on 2025   multivitamin (THERAGRAN) TABS 2025   Yes Yes   Sig: Take 1 tablet by mouth in the morning.   oxyCODONE (Roxicodone) 5 immediate release tablet Not Taking  No No   Sig: Take 1 tablet (5 mg total) by mouth every 4 (four) hours as needed for moderate pain or severe pain for up to 10 days Max Daily Amount: 30 mg   Patient not taking: Reported on 7/20/2025      Facility-Administered Medications: None     Discharge Medication List as of 7/21/2025 12:34 AM        CONTINUE these medications which have NOT CHANGED    Details   aspirin 81 mg chewable tablet Chew 1 tablet (81 mg total) 2 (two) times a day for 14 days, Starting Wed 1/24/2024, Until Sun 7/20/2025, Normal      calcium carbonate (OS-BEKA) 600 MG tablet Take 600 mg by mouth in the morning., Historical Med      cetirizine (ZyrTEC) 10 MG chewable tablet Chew 1 tablet (10 mg total) daily, Starting Tue 7/1/2025, Normal      cetirizine (ZyrTEC) 10 mg tablet Take 10 mg by mouth in the morning., Historical Med      ciprofloxacin (CIPRO) 500 mg tablet Take 1 tablet (500 mg total) by mouth every 12 (twelve) hours for 7 days, Starting Sat 7/19/2025, Until Sat 7/26/2025, Normal      clindamycin (CLEOCIN) 300 MG capsule Take 1 capsule (300 mg total) by mouth 3 (three) times a day for 7 days, Starting Sat 7/19/2025, Until Sat 7/26/2025, Normal      diphenhydrAMINE (BENADRYL) 25 mg capsule Take 25 mg by mouth every 6 (six) hours as needed for allergies, Historical Med      fluticasone (FLONASE) 50 mcg/act nasal spray 2 sprays into each nostril daily, Starting Tue 12/31/2024, Normal      Magnesium 250 MG TABS Take 250 mg by mouth in the morning., Historical Med      multivitamin (THERAGRAN) TABS Take 1 tablet by mouth in the morning., Historical Med      ibuprofen (MOTRIN) 800 mg tablet Take 1 tablet (800 mg total) by mouth 3 (three) times a day, Starting Mon 7/14/2025, Normal      methylPREDNISolone 4 MG tablet therapy pack Use as directed on package, Normal      NON FORMULARY Medical Marijuana, Historical Med       oxyCODONE (Roxicodone) 5 immediate release tablet Take 1 tablet (5 mg total) by mouth every 4 (four) hours as needed for moderate pain or severe pain for up to 10 days Max Daily Amount: 30 mg, Starting Mon 7/14/2025, Until Thu 7/24/2025 at 2359, Normal      SODIUM FLUORIDE, DENTAL GEL, 1.1 % CREA Apply to teeth daily at bedtime brush daily at bedtime, spit out, don't rinse. Floss. Nothing to eat or drink after., Starting Thu 9/26/2024, Normal           No discharge procedures on file.  ED SEPSIS DOCUMENTATION   Time reflects when diagnosis was documented in both MDM as applicable and the Disposition within this note       Time User Action Codes Description Comment    7/21/2025 12:08 AM Mo Watts [W55.01XA] Cat bite, initial encounter     7/21/2025 12:08 AM Mo Watts [L02.414] Abscess of skin of left wrist                      [1]   Past Medical History:  Diagnosis Date    Abnormal Pap smear of cervix     GERD (gastroesophageal reflux disease)     Herpes     Psychiatric disorder     depression, anxiety    Recurrent urinary tract infection 03/22/2018   [2]   Past Surgical History:  Procedure Laterality Date    KNEE SURGERY      ORIF TIBIA & FIBULA FRACTURES Right 1/24/2024    Procedure: OPEN REDUCTION W/ INTERNAL FIXATION (ORIF) ANKLE;  Surgeon: Jason Dawson DO;  Location: [facility];  Service: Orthopedics    TUBAL LIGATION     [3]   Family History  Problem Relation Name Age of Onset    No Known Problems Mother      Cancer Father          type unknown    Heart disease Father      Heart attack Father      Stroke Father      Other Father          transplant kidney  or  liver    Drug abuse Sister      No Known Problems Daughter      Heart murmur Son      Colon cancer Maternal Grandmother      Heart disease Maternal Grandfather      Heart attack Maternal Grandfather      Dementia Paternal Grandmother      Lung disease Paternal Grandfather      No Known Problems Paternal Aunt      No  Known Problems Paternal Aunt      BRCA2 Positive Neg Hx      BRCA1 Negative Neg Hx      Ovarian cancer Neg Hx      Endometrial cancer Neg Hx      BRCA 1/2 Neg Hx      Breast cancer Neg Hx     [4]   Social History  Tobacco Use    Smoking status: Some Days     Current packs/day: 0.25     Average packs/day: 0.3 packs/day for 1.1 years (0.3 ttl pk-yrs)     Types: Cigarettes     Start date: 6/1/2024    Smokeless tobacco: Never   Vaping Use    Vaping status: Never Used   Substance Use Topics    Alcohol use: Yes     Alcohol/week: 3.0 standard drinks of alcohol     Types: 3 Standard drinks or equivalent per week     Comment: socially    Drug use: Yes     Types: Marijuana     Comment: medical marijuana - daily        Jaskaran Del Rosario, DO  07/22/25 6906

## 2025-07-21 NOTE — ED PROCEDURE NOTE
Procedure  Incision and drain    Date/Time: 7/21/2025 12:37 AM    Performed by: Zach Lopez PA-C  Authorized by: Zach Lopez PA-C    Universal Protocol:  procedure performed by consultantConsent: Verbal consent obtained  Consent given by: patient  Patient understanding: patient states understanding of the procedure being performed  Radiology Images displayed and confirmed. If images not available, report reviewed: imaging studies available  Patient identity confirmed: verbally with patient    Patient location:  Bedside  Location:     Type:  Abscess    Location:  Upper extremity    Upper extremity location:  R wrist  Pre-procedure details:     Skin preparation:  Betadine  Anesthesia (see MAR for exact dosages):     Anesthesia method:  Local infiltration    Local anesthetic:  Lidocaine 1% WITH epi  Procedure details:     Complexity:  Simple    Needle aspiration: no      Incision types:  Single straight    Scalpel blade:  11    Approach:  Open    Incision depth:  Subcutaneous    Wound management:  Probed and deloculated and irrigated with saline    Drainage:  Serous    Drainage amount:  Scant    Wound treatment:  Wound left open    Packing materials:  None  Post-procedure details:     Patient tolerance of procedure:  Tolerated well, no immediate complications  Comments:      This was completed under ultrasound guidance there was some congruency however no true pocket of abscess material noted.                   Zach Lopez PA-C  07/21/25 0038

## 2025-07-21 NOTE — ED RE-EVALUATION NOTE
Discussed with hand surgery who recommended I&D at bedside. This was done by ORQUIDEA Lopez under ultrasound guidance. Despite visualized region, only scant serous drainage. Hand surgery is able to see patient in the office the following day, and thus will recommend she continue antibiotics and follow up promptly with their service for re-evaluation.     Mo Watts MD  07/21/25 2284

## 2025-07-21 NOTE — DISCHARGE INSTRUCTIONS
Continue prescribed antibiotics.     Hand surgeon Dr. Justin Fermin explained that you can follow up in his office anytime between 9 AM to 5 PM, his office is at [address] [place] [address] in [place]. Please call in the AM to confirm this.    Thank you.

## 2025-07-23 ENCOUNTER — OFFICE VISIT (OUTPATIENT)
Dept: OBGYN CLINIC | Facility: CLINIC | Age: 45
End: 2025-07-23
Payer: COMMERCIAL

## 2025-07-23 VITALS — BODY MASS INDEX: 22.81 KG/M2 | HEIGHT: 69 IN | WEIGHT: 154 LBS
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
## 2025-07-23 DIAGNOSIS — S61.551A CAT BITE OF RIGHT WRIST, INITIAL ENCOUNTER: Primary | ICD-10-CM

## 2025-07-23 PROCEDURE — 99203 OFFICE O/P NEW LOW 30 MIN: CPT | Performed by: SURGERY

## 2025-07-23 NOTE — PROGRESS NOTES
ORTHOPAEDIC HAND, WRIST, AND ELBOW OFFICE  VISIT       ASSESSMENT/PLAN:      45 y.o. year old female who presents with    Assessment & Plan  Cat bite of right wrist, initial encounter  Physical exam was performed and findings are consistent with the above diagnosis. Findings were discussed  We discussed operative and non operative treatment options  Continue with antibiotics  Discussed starting warm soapy soaks 20 mints 3 x a day  May use NSAIDs/Tylenol for pain control  May use ice or heat  Activities as tolerated  It is recommended they follow up 1 week           The patient verbalized understanding of exam findings and treatment plan. We engaged in the shared decision-making process and treatment options were discussed at length with the patient. Surgical and conservative management discussed today along with risks and benefits.        Follow Up:  Return in about 1 week (around 7/30/2025) for Recheck.    To Do Next Visit:  Re-evaluation of current issue      ____________________________________________________________________________________________________________________________________________      CHIEF COMPLAINT:  Chief Complaint   Patient presents with    Right Wrist - Pain, Cat Bite     Injury was 2 weeks ago        SUBJECTIVE:  Chaka Stiles is a 45 y.o. year old  female who presents for evaluation Right wrist cat bite      Patient states that she was bitten by her cat on the R volar wrist 7/12/2205  She stated the area became red and swollen  She was seen in ED was given oral antibiotics and thought it was getting better but ti was still red and swollen.She also had a tightness in her wrist so she returned where they performed an I and D as well as IV antibiotics.  She is still taking antibiotics augmenti but is unsure if she is to take the new stuff prescribed at her repeat ED visit  She feels the site is improving  No numbness  pain is improved    I have personally reviewed all the relevant PMH,  "PSH, SH, FH, Medications and allergies      PAST MEDICAL HISTORY:  Past Medical History[1]    PAST SURGICAL HISTORY:  Past Surgical History[2]    FAMILY HISTORY:  Family History[3]    SOCIAL HISTORY:  Social History[4]    MEDICATIONS:  Current Medications[5]    ALLERGIES:  Allergies[6]        REVIEW OF SYSTEMS:  Review of Systems   Constitutional:  Negative for chills and fever.   HENT:  Negative for ear pain and sore throat.    Eyes:  Negative for pain and visual disturbance.   Respiratory:  Negative for cough and shortness of breath.    Cardiovascular:  Negative for chest pain and palpitations.   Gastrointestinal:  Negative for abdominal pain and vomiting.   Genitourinary:  Negative for dysuria and hematuria.   Musculoskeletal:  Negative for arthralgias and back pain.   Skin:  Negative for color change and rash.   Neurological:  Negative for seizures and syncope.   All other systems reviewed and are negative.      VITALS:  There were no vitals filed for this visit.    LABS:  HgA1c: No results found for: \"HGBA1C\"  BMP:   Lab Results   Component Value Date    GLUCOSE 97 12/27/2016    CALCIUM 9.7 07/20/2025    K 3.8 07/20/2025    CO2 23 07/20/2025     07/20/2025    BUN 12 07/20/2025    CREATININE 0.83 07/20/2025       _____________________________________________________  PHYSICAL EXAMINATION:  General: well developed and well nourished, alert, oriented times 3, and appears comfortable  Psychiatric: Normal  HEENT: Normocephalic, Atraumatic Trachea Midline, No torticollis  Pulmonary: No audible wheezing or respiratory distress   Abdomen/GI: Non tender, non distended   Cardiovascular: No pitting edema, 2+ radial pulse   Skin: No masses, erythema, lacerations, fluctation, ulcerations  Neurovascular: Sensation Intact to the Median, Ulnar, Radial Nerve, Motor Intact to the Median, Ulnar, Radial Nerve, and Pulses Intact  Musculoskeletal: Normal, except as noted in detailed exam and in HPI.      MUSCULOSKELETAL " EXAMINATION:  Right hand:  SILT  Composite fist  Able to flex/extend wrist  Slight serosanguinous drainage from site  Mild swelling, no significant erythema, small eschar about 8 mm in diameter      Left hand:  SILT  Composite fist    ___________________________________________________  STUDIES REVIEWED:    No new images obtained/reviewed        PROCEDURES PERFORMED:  Procedures  No Procedures performed today    _____________________________________________________      Scribe Attestation      I,:  Uziel Jay am acting as a scribe while in the presence of the attending physician.:       I,:  Justin Fermin MD personally performed the services described in this documentation    as scribed in my presence.:                [1]   Past Medical History:  Diagnosis Date    Abnormal Pap smear of cervix     GERD (gastroesophageal reflux disease)     Herpes     Psychiatric disorder     depression, anxiety    Recurrent urinary tract infection 03/22/2018   [2]   Past Surgical History:  Procedure Laterality Date    KNEE SURGERY      ORIF TIBIA & FIBULA FRACTURES Right 1/24/2024    Procedure: OPEN REDUCTION W/ INTERNAL FIXATION (ORIF) ANKLE;  Surgeon: Jason Dawson DO;  Location: [facility] OR;  Service: Orthopedics    TUBAL LIGATION     [3]   Family History  Problem Relation Name Age of Onset    No Known Problems Mother      Cancer Father          type unknown    Heart disease Father      Heart attack Father      Stroke Father      Other Father          transplant kidney  or  liver    Drug abuse Sister      No Known Problems Daughter      Heart murmur Son      Colon cancer Maternal Grandmother      Heart disease Maternal Grandfather      Heart attack Maternal Grandfather      Dementia Paternal Grandmother      Lung disease Paternal Grandfather      No Known Problems Paternal Aunt      No Known Problems Paternal Aunt      BRCA2 Positive Neg Hx      BRCA1 Negative Neg Hx      Ovarian cancer Neg Hx      Endometrial cancer Neg Hx       BRCA 1/2 Neg Hx      Breast cancer Neg Hx     [4]   Social History  Tobacco Use    Smoking status: Some Days     Current packs/day: 0.25     Average packs/day: 0.3 packs/day for 1.1 years (0.3 ttl pk-yrs)     Types: Cigarettes     Start date: 6/1/2024    Smokeless tobacco: Never   Vaping Use    Vaping status: Never Used   Substance Use Topics    Alcohol use: Yes     Alcohol/week: 3.0 standard drinks of alcohol     Types: 3 Standard drinks or equivalent per week     Comment: socially    Drug use: Yes     Types: Marijuana     Comment: medical marijuana - daily   [5]   Current Outpatient Medications:     calcium carbonate (OS-BEKA) 600 MG tablet, Take 600 mg by mouth in the morning., Disp: , Rfl:     cetirizine (ZyrTEC) 10 MG chewable tablet, Chew 1 tablet (10 mg total) daily, Disp: 30 tablet, Rfl: 0    cetirizine (ZyrTEC) 10 mg tablet, Take 10 mg by mouth in the morning., Disp: , Rfl:     ciprofloxacin (CIPRO) 500 mg tablet, Take 1 tablet (500 mg total) by mouth every 12 (twelve) hours for 7 days, Disp: 14 tablet, Rfl: 0    clindamycin (CLEOCIN) 300 MG capsule, Take 1 capsule (300 mg total) by mouth 3 (three) times a day for 7 days, Disp: 21 capsule, Rfl: 0    diphenhydrAMINE (BENADRYL) 25 mg capsule, Take 25 mg by mouth every 6 (six) hours as needed for allergies, Disp: , Rfl:     fluticasone (FLONASE) 50 mcg/act nasal spray, 2 sprays into each nostril daily, Disp: 16 g, Rfl: 2    Magnesium 250 MG TABS, Take 250 mg by mouth in the morning., Disp: , Rfl:     multivitamin (THERAGRAN) TABS, Take 1 tablet by mouth in the morning., Disp: , Rfl:     NON FORMULARY, Medical Marijuana, Disp: , Rfl:     SODIUM FLUORIDE, DENTAL GEL, 1.1 % CREA, Apply to teeth daily at bedtime brush daily at bedtime, spit out, don't rinse. Floss. Nothing to eat or drink after., Disp: 51 g, Rfl: 20    aspirin 81 mg chewable tablet, Chew 1 tablet (81 mg total) 2 (two) times a day for 14 days, Disp: 28 tablet, Rfl: 0    ibuprofen (MOTRIN) 800 mg  tablet, Take 1 tablet (800 mg total) by mouth 3 (three) times a day (Patient not taking: Reported on 7/23/2025), Disp: 21 tablet, Rfl: 0    methylPREDNISolone 4 MG tablet therapy pack, Use as directed on package (Patient not taking: Reported on 7/19/2025), Disp: 1 each, Rfl: 0    oxyCODONE (Roxicodone) 5 immediate release tablet, Take 1 tablet (5 mg total) by mouth every 4 (four) hours as needed for moderate pain or severe pain for up to 10 days Max Daily Amount: 30 mg (Patient not taking: Reported on 7/19/2025), Disp: 10 tablet, Rfl: 0  [6] No Known Allergies

## 2025-07-26 LAB
BACTERIA BLD CULT: NORMAL
BACTERIA BLD CULT: NORMAL

## 2025-08-07 DIAGNOSIS — J30.89 NON-SEASONAL ALLERGIC RHINITIS, UNSPECIFIED TRIGGER: ICD-10-CM

## 2025-08-08 RX ORDER — FLUTICASONE PROPIONATE 50 MCG
SPRAY, SUSPENSION (ML) NASAL
Qty: 16 G | Refills: 5 | Status: SHIPPED | OUTPATIENT
Start: 2025-08-08

## 2025-08-14 ENCOUNTER — OFFICE VISIT (OUTPATIENT)
Dept: FAMILY MEDICINE CLINIC | Facility: CLINIC | Age: 45
End: 2025-08-14
Payer: COMMERCIAL

## 2025-08-21 ENCOUNTER — TELEPHONE (OUTPATIENT)
Age: 45
End: 2025-08-21

## (undated) DEVICE — DRILL BIT 3.5MM

## (undated) DEVICE — STAPLER SKIN 35 WIDE ULC APPOSE

## (undated) DEVICE — SUT VICRYL 2-0 CP-1 27 IN J266H

## (undated) DEVICE — READY WET SKIN SCRUB TRAY-LF: Brand: MEDLINE INDUSTRIES, INC.

## (undated) DEVICE — SUT VICRYL 0 CT-1 27 IN J260H

## (undated) DEVICE — PREP SURGICAL PURPREP 26ML

## (undated) DEVICE — STOCKINETTE,IMPERVIOUS,12X48,STERILE: Brand: MEDLINE

## (undated) DEVICE — GLOVE SRG BIOGEL 7.5

## (undated) DEVICE — GLOVE INDICATOR PI UNDERGLOVE SZ 7 BLUE

## (undated) DEVICE — GLOVE INDICATOR PI UNDERGLOVE SZ 8 BLUE

## (undated) DEVICE — 4-PORT MANIFOLD: Brand: NEPTUNE 2

## (undated) DEVICE — PADDING CAST 6IN COTTON STRL

## (undated) DEVICE — GAUZE SPONGES,16 PLY: Brand: CURITY

## (undated) DEVICE — DECANTER: Brand: UNBRANDED

## (undated) DEVICE — SPLINT COMFORT 4 X 30

## (undated) DEVICE — GLOVE SRG BIOGEL 8

## (undated) DEVICE — SUT ETHILON 3-0 INFS-1 30 IN 669H

## (undated) DEVICE — DRAPE C ARM STRL 30 X 30

## (undated) DEVICE — NEPTUNE E-SEP SMOKE EVACUATION PENCIL, COATED, 70MM BLADE, PUSH BUTTON SWITCH: Brand: NEPTUNE E-SEP

## (undated) DEVICE — SPONGE LAP 18 X 18 IN STRL RFD

## (undated) DEVICE — ACE WRAP 4 IN STERILE

## (undated) DEVICE — STRETCH BANDAGE: Brand: CURITY

## (undated) DEVICE — OCCLUSIVE GAUZE STRIP,3% BISMUTH TRIBROMOPHENATE IN PETROLATUM BLEND: Brand: XEROFORM

## (undated) DEVICE — ALL PURPOSE SPONGES,NON-WOVEN, 4 PLY: Brand: CURITY

## (undated) DEVICE — GLOVE INDICATOR PI UNDERGLOVE SZ 7.5 BLUE

## (undated) DEVICE — INTENDED FOR TISSUE SEPARATION, AND OTHER PROCEDURES THAT REQUIRE A SHARP SURGICAL BLADE TO PUNCTURE OR CUT.: Brand: BARD-PARKER ® CARBON RIB-BACK BLADES

## (undated) DEVICE — DRILL BIT 2 MM ANKLE CALIB GRAD ARTHREX

## (undated) DEVICE — FRAZIER SUCTION INSTRUMENT 18 FR W/OBTURATOR, NO CONTROL VENT: Brand: FRAZIER

## (undated) DEVICE — 3M™ STERI-DRAPE™ U-DRAPE 1015: Brand: STERI-DRAPE™

## (undated) DEVICE — ACE WRAP 6 IN STERILE

## (undated) DEVICE — GARMENT,MEDLINE,DVT,INT,CALF,FOAM,MED: Brand: MEDLINE

## (undated) DEVICE — ARTHREX DRILL BIT 2.5MM

## (undated) DEVICE — BETHLEHEM UNIVERSAL  MIONR EXT: Brand: CARDINAL HEALTH

## (undated) DEVICE — GLOVE PI ULTRA TOUCH SZ.7.0

## (undated) DEVICE — ASTOUND FABRIC REINFORCED SURGICAL GOWN: Brand: CONVERTORS

## (undated) DEVICE — 3M™ COBAN™ NL STERILE NON-LATEX SELF-ADHERENT WRAP, 2084S, 4 IN X 5 YD (10 CM X 4,5 M), 18 ROLLS/CASE: Brand: 3M™ COBAN™

## (undated) DEVICE — 10FR FRAZIER SUCTION HANDLE: Brand: CARDINAL HEALTH